# Patient Record
Sex: FEMALE | Race: BLACK OR AFRICAN AMERICAN | NOT HISPANIC OR LATINO | Employment: FULL TIME | URBAN - METROPOLITAN AREA
[De-identification: names, ages, dates, MRNs, and addresses within clinical notes are randomized per-mention and may not be internally consistent; named-entity substitution may affect disease eponyms.]

---

## 2023-04-07 LAB
ALANINE AMINOTRANSFERASE (SGPT) (U/L) IN SER/PLAS: 13 U/L (ref 7–45)
ALBUMIN (G/DL) IN SER/PLAS: 4.2 G/DL (ref 3.4–5)
ALKALINE PHOSPHATASE (U/L) IN SER/PLAS: 73 U/L (ref 33–136)
ANION GAP IN SER/PLAS: 13 MMOL/L (ref 10–20)
ASPARTATE AMINOTRANSFERASE (SGOT) (U/L) IN SER/PLAS: 17 U/L (ref 9–39)
BILIRUBIN TOTAL (MG/DL) IN SER/PLAS: 0.7 MG/DL (ref 0–1.2)
CALCIUM (MG/DL) IN SER/PLAS: 9.3 MG/DL (ref 8.6–10.6)
CARBON DIOXIDE, TOTAL (MMOL/L) IN SER/PLAS: 27 MMOL/L (ref 21–32)
CHLORIDE (MMOL/L) IN SER/PLAS: 105 MMOL/L (ref 98–107)
CHOLESTEROL (MG/DL) IN SER/PLAS: 225 MG/DL (ref 0–199)
CHOLESTEROL IN HDL (MG/DL) IN SER/PLAS: 65.1 MG/DL
CHOLESTEROL/HDL RATIO: 3.5
CREATININE (MG/DL) IN SER/PLAS: 0.72 MG/DL (ref 0.5–1.05)
ERYTHROCYTE DISTRIBUTION WIDTH (RATIO) BY AUTOMATED COUNT: 13.4 % (ref 11.5–14.5)
ERYTHROCYTE MEAN CORPUSCULAR HEMOGLOBIN CONCENTRATION (G/DL) BY AUTOMATED: 34.6 G/DL (ref 32–36)
ERYTHROCYTE MEAN CORPUSCULAR VOLUME (FL) BY AUTOMATED COUNT: 82 FL (ref 80–100)
ERYTHROCYTES (10*6/UL) IN BLOOD BY AUTOMATED COUNT: 4.84 X10E12/L (ref 4–5.2)
ESTIMATED AVERAGE GLUCOSE FOR HBA1C: 111 MG/DL
GFR FEMALE: >90 ML/MIN/1.73M2
GLUCOSE (MG/DL) IN SER/PLAS: 102 MG/DL (ref 74–99)
HEMATOCRIT (%) IN BLOOD BY AUTOMATED COUNT: 39.6 % (ref 36–46)
HEMOGLOBIN (G/DL) IN BLOOD: 13.7 G/DL (ref 12–16)
HEMOGLOBIN A1C/HEMOGLOBIN TOTAL IN BLOOD: 5.5 %
LDL: 135 MG/DL (ref 0–99)
LEUKOCYTES (10*3/UL) IN BLOOD BY AUTOMATED COUNT: 5.5 X10E9/L (ref 4.4–11.3)
NRBC (PER 100 WBCS) BY AUTOMATED COUNT: 0 /100 WBC (ref 0–0)
PLATELETS (10*3/UL) IN BLOOD AUTOMATED COUNT: 327 X10E9/L (ref 150–450)
POTASSIUM (MMOL/L) IN SER/PLAS: 4 MMOL/L (ref 3.5–5.3)
PROTEIN TOTAL: 6.9 G/DL (ref 6.4–8.2)
SODIUM (MMOL/L) IN SER/PLAS: 141 MMOL/L (ref 136–145)
THYROTROPIN (MIU/L) IN SER/PLAS BY DETECTION LIMIT <= 0.05 MIU/L: 2.1 MIU/L (ref 0.44–3.98)
TRIGLYCERIDE (MG/DL) IN SER/PLAS: 127 MG/DL (ref 0–149)
UREA NITROGEN (MG/DL) IN SER/PLAS: 14 MG/DL (ref 6–23)
VLDL: 25 MG/DL (ref 0–40)

## 2023-10-12 ENCOUNTER — ANCILLARY PROCEDURE (OUTPATIENT)
Dept: RADIOLOGY | Facility: CLINIC | Age: 63
End: 2023-10-12
Payer: COMMERCIAL

## 2023-10-12 ENCOUNTER — OFFICE VISIT (OUTPATIENT)
Dept: ORTHOPEDIC SURGERY | Facility: CLINIC | Age: 63
End: 2023-10-12
Payer: COMMERCIAL

## 2023-10-12 DIAGNOSIS — M13.852 ALLERGIC ARTHRITIS OF LEFT HIP: ICD-10-CM

## 2023-10-12 DIAGNOSIS — M16.10 HIP ARTHRITIS: Primary | ICD-10-CM

## 2023-10-12 DIAGNOSIS — M25.552 LEFT HIP PAIN: ICD-10-CM

## 2023-10-12 DIAGNOSIS — M25.552 LEFT HIP PAIN: Primary | ICD-10-CM

## 2023-10-12 PROBLEM — Z96.641 STATUS POST TOTAL REPLACEMENT OF RIGHT HIP: Status: ACTIVE | Noted: 2023-10-12

## 2023-10-12 PROBLEM — D3A.026 CARCINOID TUMOR OF RECTUM (CMS-HCC): Status: ACTIVE | Noted: 2023-10-12

## 2023-10-12 PROBLEM — J30.9 ALLERGIC RHINITIS: Status: ACTIVE | Noted: 2023-10-12

## 2023-10-12 PROBLEM — B37.31 VAGINAL CANDIDIASIS: Status: ACTIVE | Noted: 2023-10-12

## 2023-10-12 PROBLEM — H10.13 ATOPIC CONJUNCTIVITIS OF BOTH EYES: Status: ACTIVE | Noted: 2023-10-12

## 2023-10-12 PROBLEM — R06.02 SHORTNESS OF BREATH: Status: ACTIVE | Noted: 2023-10-12

## 2023-10-12 PROBLEM — K64.4 ANAL SKIN TAG: Status: ACTIVE | Noted: 2023-10-12

## 2023-10-12 PROBLEM — M47.9 OSTEOARTHRITIS OF LOWER BACK: Status: ACTIVE | Noted: 2023-10-12

## 2023-10-12 PROBLEM — R06.89 IMPAIRED GAS EXCHANGE: Status: ACTIVE | Noted: 2023-10-12

## 2023-10-12 PROBLEM — K63.5 COLON POLYP: Status: ACTIVE | Noted: 2023-10-12

## 2023-10-12 PROBLEM — H57.9 ITCHY EYES: Status: ACTIVE | Noted: 2023-10-12

## 2023-10-12 PROBLEM — H18.413 ARCUS SENILIS OF BOTH CORNEAS: Status: ACTIVE | Noted: 2023-10-12

## 2023-10-12 PROBLEM — N81.0 URETHROCELE: Status: ACTIVE | Noted: 2023-10-12

## 2023-10-12 PROBLEM — E78.5 DYSLIPIDEMIA: Status: ACTIVE | Noted: 2023-10-12

## 2023-10-12 PROBLEM — Z85.040: Status: ACTIVE | Noted: 2023-10-12

## 2023-10-12 PROBLEM — R35.1 NOCTURIA: Status: ACTIVE | Noted: 2023-10-12

## 2023-10-12 PROBLEM — I10 HYPERTENSION: Status: ACTIVE | Noted: 2023-10-12

## 2023-10-12 PROBLEM — M17.9 OA (OSTEOARTHRITIS) OF KNEE: Status: ACTIVE | Noted: 2023-10-12

## 2023-10-12 PROBLEM — N32.81 OVERACTIVE BLADDER: Status: ACTIVE | Noted: 2023-10-12

## 2023-10-12 PROBLEM — K21.9 ESOPHAGEAL REFLUX: Status: ACTIVE | Noted: 2023-10-12

## 2023-10-12 PROBLEM — L30.9 ECZEMA: Status: ACTIVE | Noted: 2023-10-12

## 2023-10-12 PROBLEM — L72.3 SEBACEOUS CYST: Status: ACTIVE | Noted: 2023-10-12

## 2023-10-12 PROBLEM — E55.9 VITAMIN D DEFICIENCY: Status: ACTIVE | Noted: 2023-10-12

## 2023-10-12 PROBLEM — R13.10 DYSPHAGIA: Status: ACTIVE | Noted: 2023-10-12

## 2023-10-12 PROBLEM — N95.2 ATROPHIC VAGINITIS: Status: ACTIVE | Noted: 2023-10-12

## 2023-10-12 PROBLEM — R92.8 ABNORMAL MAMMOGRAM: Status: ACTIVE | Noted: 2023-10-12

## 2023-10-12 PROBLEM — M16.9 OA (OSTEOARTHRITIS) OF HIP: Status: ACTIVE | Noted: 2023-10-12

## 2023-10-12 PROBLEM — N89.8 VAGINAL LESION: Status: ACTIVE | Noted: 2023-10-12

## 2023-10-12 PROBLEM — T14.8XXA MUSCLE STRAIN: Status: ACTIVE | Noted: 2023-10-12

## 2023-10-12 PROBLEM — H53.8 BLURRY VISION, BILATERAL: Status: ACTIVE | Noted: 2023-10-12

## 2023-10-12 PROBLEM — M16.11 PRIMARY OSTEOARTHRITIS OF RIGHT HIP: Status: ACTIVE | Noted: 2023-10-12

## 2023-10-12 PROBLEM — Z86.012 HISTORY OF BENIGN CARCINOID TUMOR: Status: ACTIVE | Noted: 2023-10-12

## 2023-10-12 PROBLEM — L29.9 ITCHING: Status: ACTIVE | Noted: 2023-10-12

## 2023-10-12 PROBLEM — N76.2 VULVITIS: Status: ACTIVE | Noted: 2023-10-12

## 2023-10-12 PROBLEM — N39.41 URGE INCONTINENCE: Status: ACTIVE | Noted: 2023-10-12

## 2023-10-12 PROCEDURE — 1126F AMNT PAIN NOTED NONE PRSNT: CPT | Performed by: ORTHOPAEDIC SURGERY

## 2023-10-12 PROCEDURE — 3075F SYST BP GE 130 - 139MM HG: CPT | Performed by: ORTHOPAEDIC SURGERY

## 2023-10-12 PROCEDURE — 99214 OFFICE O/P EST MOD 30 MIN: CPT | Performed by: ORTHOPAEDIC SURGERY

## 2023-10-12 PROCEDURE — 73502 X-RAY EXAM HIP UNI 2-3 VIEWS: CPT | Mod: LEFT SIDE | Performed by: RADIOLOGY

## 2023-10-12 PROCEDURE — 3078F DIAST BP <80 MM HG: CPT | Performed by: ORTHOPAEDIC SURGERY

## 2023-10-12 PROCEDURE — 73502 X-RAY EXAM HIP UNI 2-3 VIEWS: CPT | Mod: LT

## 2023-10-12 RX ORDER — TRIAMCINOLONE ACETONIDE 0.25 MG/G
OINTMENT TOPICAL
COMMUNITY
Start: 2016-07-07 | End: 2023-11-21 | Stop reason: ALTCHOICE

## 2023-10-12 RX ORDER — AMITRIPTYLINE HYDROCHLORIDE 10 MG/1
10 TABLET, FILM COATED ORAL NIGHTLY
COMMUNITY

## 2023-10-12 RX ORDER — CHOLECALCIFEROL (VITAMIN D3) 125 MCG
1 CAPSULE ORAL DAILY
COMMUNITY

## 2023-10-12 RX ORDER — CLOBETASOL PROPIONATE 0.5 MG/G
OINTMENT TOPICAL
COMMUNITY
Start: 2023-02-12 | End: 2023-11-21 | Stop reason: ALTCHOICE

## 2023-10-12 RX ORDER — MUPIROCIN 20 MG/G
OINTMENT TOPICAL 2 TIMES DAILY
COMMUNITY
Start: 2020-03-12 | End: 2023-11-15 | Stop reason: WASHOUT

## 2023-10-12 RX ORDER — AZELASTINE HYDROCHLORIDE 0.5 MG/ML
1 SOLUTION/ DROPS OPHTHALMIC 2 TIMES DAILY PRN
COMMUNITY

## 2023-10-12 RX ORDER — HYDROXYZINE HYDROCHLORIDE 10 MG/1
1-2 TABLET, FILM COATED ORAL
COMMUNITY
Start: 2020-01-21 | End: 2023-11-21 | Stop reason: ALTCHOICE

## 2023-10-12 RX ORDER — VIBEGRON 75 MG/1
TABLET, FILM COATED ORAL
COMMUNITY
Start: 2023-10-09 | End: 2023-11-15 | Stop reason: WASHOUT

## 2023-10-12 RX ORDER — TRAMADOL HYDROCHLORIDE 50 MG/1
50 TABLET ORAL EVERY 8 HOURS PRN
Qty: 10 TABLET | Refills: 0 | Status: SHIPPED | OUTPATIENT
Start: 2023-10-12 | End: 2023-10-17

## 2023-10-12 RX ORDER — DICLOFENAC POTASSIUM 50 MG/1
1 TABLET, FILM COATED ORAL 2 TIMES DAILY
COMMUNITY
End: 2023-11-15 | Stop reason: WASHOUT

## 2023-10-12 RX ORDER — OXYBUTYNIN CHLORIDE 10 MG/1
10 TABLET, EXTENDED RELEASE ORAL 2 TIMES DAILY
COMMUNITY
Start: 2023-01-23 | End: 2023-10-31

## 2023-10-12 RX ORDER — MINOXIDIL 2.5 MG/1
0.5 TABLET ORAL DAILY
COMMUNITY
Start: 2023-08-14

## 2023-10-12 RX ORDER — DICLOFENAC SODIUM 100 MG/1
100 TABLET, EXTENDED RELEASE ORAL DAILY
COMMUNITY
End: 2023-10-31

## 2023-10-12 RX ORDER — CALCIUM CARBONATE 500(1250)
1 TABLET ORAL 2 TIMES DAILY
COMMUNITY
End: 2023-11-15 | Stop reason: WASHOUT

## 2023-10-12 RX ORDER — AMOXICILLIN 500 MG/1
CAPSULE ORAL
COMMUNITY
Start: 2023-05-08 | End: 2024-01-18 | Stop reason: ALTCHOICE

## 2023-10-12 RX ORDER — ASCORBATE CALCIUM 500 MG
1 TABLET ORAL DAILY
COMMUNITY
Start: 2017-06-08 | End: 2023-11-15 | Stop reason: WASHOUT

## 2023-10-12 RX ORDER — OXYCODONE AND ACETAMINOPHEN 5; 325 MG/1; MG/1
1 TABLET ORAL EVERY 6 HOURS PRN
COMMUNITY
Start: 2020-12-30 | End: 2023-11-15 | Stop reason: WASHOUT

## 2023-10-12 RX ORDER — MIRABEGRON 50 MG/1
50 TABLET, FILM COATED, EXTENDED RELEASE ORAL DAILY
COMMUNITY

## 2023-10-12 RX ORDER — OXYBUTYNIN CHLORIDE 5 MG/1
TABLET ORAL 2 TIMES DAILY
COMMUNITY
Start: 2013-09-09 | End: 2023-11-15 | Stop reason: WASHOUT

## 2023-10-12 RX ORDER — CLOBETASOL PROPIONATE 0.5 MG/G
CREAM TOPICAL
COMMUNITY
Start: 2020-01-30

## 2023-10-12 RX ORDER — TOBRAMYCIN 3 MG/ML
1-2 SOLUTION/ DROPS OPHTHALMIC 4 TIMES DAILY
COMMUNITY
Start: 2022-07-29 | End: 2023-11-15 | Stop reason: WASHOUT

## 2023-10-12 RX ORDER — ERGOCALCIFEROL 1.25 MG/1
1 CAPSULE ORAL
COMMUNITY
Start: 2017-06-08 | End: 2023-10-31

## 2023-10-12 RX ORDER — PROPRANOLOL/HYDROCHLOROTHIAZID 40 MG-25MG
1 TABLET ORAL DAILY
COMMUNITY
End: 2023-11-21 | Stop reason: ALTCHOICE

## 2023-10-12 RX ORDER — CYCLOBENZAPRINE HCL 10 MG
1 TABLET ORAL NIGHTLY PRN
COMMUNITY
Start: 2020-05-01

## 2023-10-12 RX ORDER — OXYBUTYNIN CHLORIDE 15 MG/1
15 TABLET, EXTENDED RELEASE ORAL 2 TIMES DAILY
COMMUNITY

## 2023-10-12 RX ORDER — FLUTICASONE PROPIONATE 50 MCG
1 SPRAY, SUSPENSION (ML) NASAL DAILY PRN
COMMUNITY
Start: 2019-10-22

## 2023-10-12 RX ORDER — LANOLIN ALCOHOL/MO/W.PET/CERES
1 CREAM (GRAM) TOPICAL DAILY
COMMUNITY

## 2023-10-12 RX ORDER — SIMVASTATIN 20 MG/1
TABLET, FILM COATED ORAL
COMMUNITY
Start: 2023-10-03

## 2023-10-12 RX ORDER — OLOPATADINE HYDROCHLORIDE 2 MG/ML
1 SOLUTION/ DROPS OPHTHALMIC DAILY
COMMUNITY
Start: 2017-08-10 | End: 2023-11-15 | Stop reason: WASHOUT

## 2023-10-12 RX ORDER — MELOXICAM 7.5 MG/1
1 TABLET ORAL DAILY
COMMUNITY
End: 2023-11-15 | Stop reason: WASHOUT

## 2023-10-12 RX ORDER — DUTASTERIDE 0.5 MG/1
CAPSULE, LIQUID FILLED ORAL DAILY
COMMUNITY
Start: 2023-04-28 | End: 2023-11-15 | Stop reason: WASHOUT

## 2023-10-12 RX ORDER — DICLOFENAC SODIUM 10 MG/G
4 GEL TOPICAL 2 TIMES DAILY PRN
COMMUNITY
Start: 2023-05-08 | End: 2024-04-01 | Stop reason: SDUPTHER

## 2023-10-12 RX ORDER — HYDROCHLOROTHIAZIDE 25 MG/1
1 TABLET ORAL DAILY
COMMUNITY
Start: 2016-06-01 | End: 2024-03-04

## 2023-10-12 RX ORDER — UBIDECARENONE 75 MG
1 CAPSULE ORAL DAILY
COMMUNITY
End: 2023-11-21 | Stop reason: ALTCHOICE

## 2023-10-12 RX ORDER — OMEPRAZOLE 40 MG/1
1 CAPSULE, DELAYED RELEASE ORAL DAILY
COMMUNITY
Start: 2017-05-02 | End: 2023-11-15 | Stop reason: WASHOUT

## 2023-10-12 RX ORDER — TRIAMCINOLONE ACETONIDE OINTMENT USP, 0.05% 0.5 MG/G
1 OINTMENT TOPICAL 3 TIMES DAILY
COMMUNITY
End: 2023-11-15 | Stop reason: WASHOUT

## 2023-10-12 RX ORDER — MONTELUKAST SODIUM 10 MG/1
1 TABLET ORAL DAILY PRN
COMMUNITY
Start: 2019-10-22 | End: 2024-01-18 | Stop reason: SDUPTHER

## 2023-10-12 RX ORDER — ACETAMINOPHEN 500 MG
1 TABLET ORAL DAILY
COMMUNITY
End: 2024-05-30 | Stop reason: SDUPTHER

## 2023-10-12 RX ORDER — HYDROCODONE BITARTRATE AND ACETAMINOPHEN 10; 325 MG/1; MG/1
1 TABLET ORAL EVERY 4 HOURS PRN
COMMUNITY
End: 2023-11-15 | Stop reason: WASHOUT

## 2023-10-12 RX ORDER — ALBUTEROL SULFATE 90 UG/1
1-2 AEROSOL, METERED RESPIRATORY (INHALATION)
COMMUNITY

## 2023-10-12 NOTE — PROGRESS NOTES
Patient presents with progressive severe left hip pain  She had a right hip replacement done by me several years ago and has done well she has disabling left hip pain now treatment has included activity modification ambulatory aids and attempts at weight loss and activity modification  She has rest pain in fact is requesting pain medications as well  She like to proceed with left total hip arthroplasty  Past medical,family and social histories have been reviewed and are up to date.  All other body systems have been reviewed and are negative for complaint.  Constitutional: Well-developed well-nourished   Eyes: Sclerae anicteric, pupils equal and round  HENT: Normocephalic atraumatic  Cardiovascular: Pulses full, regular rate and rhythm  Respiratory: Breathing not labored, no wheezing  Integumentary: Skin intact, no lesions or rashes  Neurological: Sensation intact, no gross strength deficits, reflexes equal  Psychiatric: Alert oriented and appropriate  Hematologic/lymphatic: No lymphadenopathy  Left hip slight flexion contracture antalgic gait 0 rotation with reproduction of pain  X-rays show severe end-stage arthritis with complete ablation of joint space cystic and sclerotic changes  Impression end-stage arthritis  This patient has failed nonoperative treatment for hip arthritis which has included activity modification attempts at weight loss physical therapy and oral anti-inflammatory medication.  We have discussed the potential risks of surgery including but not limited to leg length inequality, infection, DVT, neurovascular injury persistent pain, prosthetic loosening and periprosthetic fracture.  The patient wishes to proceed with surgery  Patient understands the preoperative medical evaluation and joint replacement class will be necessary.  The patient also understands that the plan is this for her to be an outpatient procedure and that appropriate arrangements must be made for supervision and assistance at  home.  She does state that she has limited help at home and may need admission for care

## 2023-10-20 PROBLEM — M13.852 ALLERGIC ARTHRITIS OF LEFT HIP: Status: ACTIVE | Noted: 2023-10-12

## 2023-10-31 DIAGNOSIS — E55.9 VITAMIN D DEFICIENCY, UNSPECIFIED: ICD-10-CM

## 2023-10-31 DIAGNOSIS — M25.551 PAIN IN RIGHT HIP: ICD-10-CM

## 2023-10-31 DIAGNOSIS — N39.41 URGE INCONTINENCE: ICD-10-CM

## 2023-10-31 DIAGNOSIS — M25.552 PAIN IN LEFT HIP: ICD-10-CM

## 2023-10-31 RX ORDER — OXYBUTYNIN CHLORIDE 10 MG/1
10 TABLET, EXTENDED RELEASE ORAL 2 TIMES DAILY
Qty: 180 TABLET | Refills: 3 | Status: SHIPPED | OUTPATIENT
Start: 2023-10-31 | End: 2023-11-15 | Stop reason: WASHOUT

## 2023-10-31 RX ORDER — ERGOCALCIFEROL 1.25 MG/1
1 CAPSULE ORAL
Qty: 6 CAPSULE | Refills: 1 | Status: SHIPPED | OUTPATIENT
Start: 2023-10-31 | End: 2024-02-15

## 2023-10-31 RX ORDER — DICLOFENAC SODIUM 100 MG/1
100 TABLET, EXTENDED RELEASE ORAL DAILY
Qty: 30 TABLET | Refills: 0 | Status: SHIPPED | OUTPATIENT
Start: 2023-10-31 | End: 2023-11-21 | Stop reason: ALTCHOICE

## 2023-11-08 PROBLEM — M16.12 PRIMARY OSTEOARTHRITIS OF LEFT HIP: Status: ACTIVE | Noted: 2023-10-12

## 2023-11-10 ENCOUNTER — TELEPHONE (OUTPATIENT)
Dept: PREADMISSION TESTING | Facility: HOSPITAL | Age: 63
End: 2023-11-10
Payer: COMMERCIAL

## 2023-11-15 ENCOUNTER — TELEMEDICINE CLINICAL SUPPORT (OUTPATIENT)
Dept: PREADMISSION TESTING | Facility: HOSPITAL | Age: 63
End: 2023-11-15
Payer: COMMERCIAL

## 2023-11-21 ENCOUNTER — PRE-ADMISSION TESTING (OUTPATIENT)
Dept: PREADMISSION TESTING | Facility: HOSPITAL | Age: 63
End: 2023-11-21
Payer: COMMERCIAL

## 2023-11-21 ENCOUNTER — LAB (OUTPATIENT)
Dept: LAB | Facility: LAB | Age: 63
End: 2023-11-21
Payer: COMMERCIAL

## 2023-11-21 ENCOUNTER — HOSPITAL ENCOUNTER (OUTPATIENT)
Dept: RADIOLOGY | Facility: HOSPITAL | Age: 63
Discharge: HOME | End: 2023-11-21
Payer: COMMERCIAL

## 2023-11-21 VITALS
OXYGEN SATURATION: 96 % | BODY MASS INDEX: 30.23 KG/M2 | RESPIRATION RATE: 18 BRPM | TEMPERATURE: 96.1 F | WEIGHT: 181.44 LBS | DIASTOLIC BLOOD PRESSURE: 68 MMHG | SYSTOLIC BLOOD PRESSURE: 131 MMHG | HEIGHT: 65 IN | HEART RATE: 80 BPM

## 2023-11-21 DIAGNOSIS — I10 PRIMARY HYPERTENSION: ICD-10-CM

## 2023-11-21 DIAGNOSIS — M25.552 LEFT HIP PAIN: ICD-10-CM

## 2023-11-21 DIAGNOSIS — M25.552 LEFT HIP PAIN: Primary | ICD-10-CM

## 2023-11-21 DIAGNOSIS — I10 PRIMARY HYPERTENSION: Primary | ICD-10-CM

## 2023-11-21 LAB
ABO GROUP (TYPE) IN BLOOD: NORMAL
ANION GAP SERPL CALC-SCNC: 13 MMOL/L (ref 10–20)
ANTIBODY SCREEN: NORMAL
BASOPHILS # BLD AUTO: 0.02 X10*3/UL (ref 0–0.1)
BASOPHILS NFR BLD AUTO: 0.2 %
BUN SERPL-MCNC: 9 MG/DL (ref 6–23)
CALCIUM SERPL-MCNC: 8.6 MG/DL (ref 8.6–10.3)
CHLORIDE SERPL-SCNC: 102 MMOL/L (ref 98–107)
CO2 SERPL-SCNC: 28 MMOL/L (ref 21–32)
CREAT SERPL-MCNC: 0.65 MG/DL (ref 0.5–1.05)
EOSINOPHIL # BLD AUTO: 0.13 X10*3/UL (ref 0–0.7)
EOSINOPHIL NFR BLD AUTO: 1.6 %
ERYTHROCYTE [DISTWIDTH] IN BLOOD BY AUTOMATED COUNT: 13.1 % (ref 11.5–14.5)
GFR SERPL CREATININE-BSD FRML MDRD: >90 ML/MIN/1.73M*2
GLUCOSE SERPL-MCNC: 64 MG/DL (ref 74–99)
HCT VFR BLD AUTO: 38.9 % (ref 36–46)
HGB BLD-MCNC: 13.5 G/DL (ref 12–16)
IMM GRANULOCYTES # BLD AUTO: 0.03 X10*3/UL (ref 0–0.7)
IMM GRANULOCYTES NFR BLD AUTO: 0.4 % (ref 0–0.9)
LYMPHOCYTES # BLD AUTO: 1.52 X10*3/UL (ref 1.2–4.8)
LYMPHOCYTES NFR BLD AUTO: 18.2 %
MCH RBC QN AUTO: 28.7 PG (ref 26–34)
MCHC RBC AUTO-ENTMCNC: 34.7 G/DL (ref 32–36)
MCV RBC AUTO: 83 FL (ref 80–100)
MONOCYTES # BLD AUTO: 0.42 X10*3/UL (ref 0.1–1)
MONOCYTES NFR BLD AUTO: 5 %
NEUTROPHILS # BLD AUTO: 6.21 X10*3/UL (ref 1.2–7.7)
NEUTROPHILS NFR BLD AUTO: 74.6 %
NRBC BLD-RTO: 0 /100 WBCS (ref 0–0)
PLATELET # BLD AUTO: 317 X10*3/UL (ref 150–450)
POTASSIUM SERPL-SCNC: 4 MMOL/L (ref 3.5–5.3)
RBC # BLD AUTO: 4.7 X10*6/UL (ref 4–5.2)
RH FACTOR (ANTIGEN D): NORMAL
SODIUM SERPL-SCNC: 139 MMOL/L (ref 136–145)
WBC # BLD AUTO: 8.3 X10*3/UL (ref 4.4–11.3)

## 2023-11-21 PROCEDURE — 73502 X-RAY EXAM HIP UNI 2-3 VIEWS: CPT | Mod: LEFT SIDE | Performed by: RADIOLOGY

## 2023-11-21 PROCEDURE — 86850 RBC ANTIBODY SCREEN: CPT

## 2023-11-21 PROCEDURE — 93005 ELECTROCARDIOGRAM TRACING: CPT | Performed by: NURSE PRACTITIONER

## 2023-11-21 PROCEDURE — 86900 BLOOD TYPING SEROLOGIC ABO: CPT

## 2023-11-21 PROCEDURE — 36415 COLL VENOUS BLD VENIPUNCTURE: CPT

## 2023-11-21 PROCEDURE — 73502 X-RAY EXAM HIP UNI 2-3 VIEWS: CPT | Mod: LT,FY

## 2023-11-21 PROCEDURE — 85025 COMPLETE CBC W/AUTO DIFF WBC: CPT

## 2023-11-21 PROCEDURE — 87081 CULTURE SCREEN ONLY: CPT | Mod: AHULAB | Performed by: NURSE PRACTITIONER

## 2023-11-21 PROCEDURE — 99204 OFFICE O/P NEW MOD 45 MIN: CPT | Performed by: NURSE PRACTITIONER

## 2023-11-21 PROCEDURE — 80048 BASIC METABOLIC PNL TOTAL CA: CPT

## 2023-11-21 PROCEDURE — 86901 BLOOD TYPING SEROLOGIC RH(D): CPT

## 2023-11-21 RX ORDER — CHLORHEXIDINE GLUCONATE ORAL RINSE 1.2 MG/ML
SOLUTION DENTAL
Qty: 473 ML | Refills: 0 | Status: SHIPPED | OUTPATIENT
Start: 2023-11-21

## 2023-11-21 ASSESSMENT — ENCOUNTER SYMPTOMS
CARDIOVASCULAR NEGATIVE: 1
LIMITED RANGE OF MOTION: 1
CONSTITUTIONAL NEGATIVE: 1
RESPIRATORY NEGATIVE: 1
GASTROINTESTINAL NEGATIVE: 1
NEUROLOGICAL NEGATIVE: 1
DYSPNEA WITH EXERTION: 1
ARTHRALGIAS: 1
NECK NEGATIVE: 1
ENDOCRINE NEGATIVE: 1

## 2023-11-21 NOTE — PREPROCEDURE INSTRUCTIONS
Medication List            Accurate as of November 21, 2023  2:58 PM. Always use your most recent med list.                albuterol 90 mcg/actuation inhaler  Notes to patient: MAY USE MORNING OF SURGERY     amitriptyline 10 mg tablet  Commonly known as: Elavil  Notes to patient: MAY TAKE MORNING OF SURGERY     amoxicillin 500 mg capsule  Commonly known as: Amoxil  Notes to patient: MAY TAKE MORNING OF SURGERY     azelastine 0.05 % ophthalmic solution  Commonly known as: Optivar  Notes to patient: MAY USE MORNING OF SURGERY     biotin 5,000 mcg tablet,disintegrating  Medication Adjustments for Surgery: Stop 1 day before surgery     chlorhexidine 0.12 % solution  Commonly known as: Peridex  SWISH AND SPIT 15ML FOR 30 SECONDS NIGHT PRIOR TO SURGERY AND MORNING OF SURGERY     cholecalciferol 50 mcg (2,000 unit) capsule  Commonly known as: Vitamin D-3  Medication Adjustments for Surgery: Stop 1 day before surgery     clobetasol 0.05 % cream  Commonly known as: Temovate  Medication Adjustments for Surgery: Stop 1 day before surgery     cyanocobalamin 1,000 mcg tablet  Commonly known as: Vitamin B-12  Medication Adjustments for Surgery: Stop 1 day before surgery     cyclobenzaprine 10 mg tablet  Commonly known as: Flexeril  Notes to patient: MAY TAKE MORNING OF SURGERY     diclofenac sodium 1 % gel gel  Commonly known as: Voltaren  Medication Adjustments for Surgery: Stop 1 day before surgery     ergocalciferol 1.25 MG (11960 UT) capsule  Commonly known as: Vitamin D-2  TAKE 1 CAPSULE BY MOUTH ONE TIME PER WEEK  Medication Adjustments for Surgery: Stop 1 day before surgery     fluticasone 50 mcg/actuation nasal spray  Commonly known as: Flonase  Notes to patient: MAY USE MORNING OF SURGERY     hydroCHLOROthiazide 25 mg tablet  Commonly known as: HYDRODiuril  Medication Adjustments for Surgery: Stop 1 day before surgery     minoxidil 2.5 mg tablet  Commonly known as: Loniten  Medication Adjustments for Surgery: Stop 1  day before surgery     montelukast 10 mg tablet  Commonly known as: Singulair  Medication Adjustments for Surgery: Stop 1 day before surgery     Myrbetriq 50 mg tablet extended release 24 hr 24 hr tablet  Generic drug: mirabegron  Notes to patient: MAY TAKE MORNING OF SURGERY     oxybutynin XL 15 mg 24 hr tablet  Commonly known as: Ditropan-XL  Notes to patient: MAY TAKE MORNING OF SURGERY     simvastatin 20 mg tablet  Commonly known as: Zocor  Medication Adjustments for Surgery: Stop 1 day before surgery            CONTACT SURGEON'S OFFICE IF YOU DEVELOP:  * Fever = 100.4 F   * New respiratory symptoms (e.g. cough, shortness of breath, respiratory distress, sore throat)  * Recent loss of taste or smell  *Flu like symptoms such as headache, fatigue or gastrointestinal symptoms  * You develop any open sores, shingles, burning or painful urination   AND/OR:  * You no longer wish to have the surgery.  * Any other personal circumstances change that may lead to the need to cancel or defer this surgery.  *You were admitted to any hospital within one week of your planned procedure.    SMOKING:  *Quitting smoking can make a huge difference to your health and recovery from surgery.    *If you need help with quitting, call 6-448-QUIT-NOW.    THE DAY BEFORE SURGERY:  *Do not eat any food after midnight the night before surgery.   *You are permitted to drink clear liquids (i.e. water, black coffee, tea, clear broth, apple juice) up to 2 hours before your surgery.  DIABETICS:  Please check fasting blood sugar  upon waking up.  If fasting sugar is <80 mg/dl, please drink 100ml/3oz of apple juice no later than 2 hours prior to surgery.      SURGICAL TIME  *You will be contacted between 2 p.m. and 6 p.m. the business day before your surgery with your arrival time.  *If you haven't received a call by 6pm, call [phone].  *Scheduled surgery times may change and you will be notified if this occurs-check your personal voicemail for  any updates.    ON THE MORNING OF SURGERY:  *Wear comfortable, loose fitting clothing.   *Do not use moisturizers, creams, lotions or perfume.  *All jewelry and valuables should be left at home.  *Prosthetic devices such as contact lenses, hearing aids, dentures, eyelash extensions, hairpins and body piercing must be removed before surgery.    BRING WITH YOU:  *Photo ID and insurance card  *Current list of medicines and allergies  *Pacemaker/Defibrillator/Heart stent cards  *CPAP machine and mask  *Slings/splints/crutches  *Copy of your complete Advanced Directive/DHPOA-if applicable  *Neurostimulator implant remote    PARKING AND ARRIVAL:  *Check in at the Main Entrance desk and let them know you are here for surgery.  *You will be directed to the 2nd floor surgical waiting area.    AFTER OUTPATIENT SURGERY:  *A responsible adult MUST accompany you at the time of discharge and stay with you for 24 hours after your surgery.  *You may NOT drive yourself home after surgery.  *You may use a taxi or ride sharing service ([employer], Uber) to return home ONLY if you are accompanied by a friend or family member.  *Instructions for resuming your medications will be provided by your surgeon.

## 2023-11-21 NOTE — CPM/PAT H&P
[facility]/PAT Evaluation       Name: Kiara Leyva (Kiara Leyva)  /Age: 1960/63 y.o.     In-Person       Date of Consult: 23    Referring Provider:  Dr. Rod    Surgery, Date, and Length: 23, left total hip replacement, 120 minutes    Patient presents to [facility] for perioperative risk assessment prior to scheduled surgery. Patient presents with end stage arthritis of left hip and has failed conservative non-operative treatment. She would like to proceed with left hip replacement.    This note was created in part upon personal review of patient's medical records.      Pt denies any past history of anesthetic complications such as PONV, awareness, prolonged sedation, dental damage, aspiration, cardiac arrest, difficult intubation, difficult I.V. access or unexpected hospital admissions.  No history of malignant hyperthermia and or pseudocholinesterase deficiency.    No history of blood transfusions.    The patient is not a Mu-[employer] and will accept blood and blood products if medically indicated.     Type and screen sent.    Past Medical History:   Diagnosis Date    Arthritis     Asthma     Carcinoid tumor     noted on colonoscopy, s/p resection    Eczema     GERD (gastroesophageal reflux disease)     Hyperlipidemia     Hypertension     OAB (overactive bladder)     Vitamin D deficiency        Past Surgical History:   Procedure Laterality Date    COLONOSCOPY      OTHER SURGICAL HISTORY      carcinoid tumor resection    TOTAL HIP ARTHROPLASTY Right     TUBAL LIGATION  2016    Tubal Ligation       Family History   Problem Relation Name Age of Onset    Coronary artery disease Mother          CABG    Other (homicicde) Father      Hypertension Father      Breast cancer Mother's Sister      Colon cancer Mother's Brother      Ovarian cysts Maternal Cousin         No Known Allergies      Current Outpatient Medications:     albuterol 90 mcg/actuation inhaler, Inhale 1-2 puffs. EVERY 4 TO 6  HOURS AS NEEDED, Disp: , Rfl:     amitriptyline (Elavil) 10 mg tablet, Take 1 tablet (10 mg) by mouth once daily at bedtime., Disp: , Rfl:     azelastine (Optivar) 0.05 % ophthalmic solution, Administer 1 drop into affected eye(s) 2 times a day as needed. affected eye, Disp: , Rfl:     biotin 5,000 mcg tablet,disintegrating, Take 1 tablet by mouth once daily., Disp: , Rfl:     cholecalciferol (Vitamin D-3) 50 mcg (2,000 unit) capsule, Take 1 capsule (50 mcg) by mouth early in the morning.., Disp: , Rfl:     clobetasol (Temovate) 0.05 % cream, APPLY sparingly to affected area Twice daily for 2 weeks, then once daily as needed., Disp: , Rfl:     cyanocobalamin (Vitamin B-12) 1,000 mcg tablet, Take 1 tablet (1,000 mcg) by mouth once daily., Disp: , Rfl:     cyclobenzaprine (Flexeril) 10 mg tablet, Take 1 tablet (10 mg) by mouth as needed at bedtime., Disp: , Rfl:     diclofenac sodium (Voltaren) 1 % gel gel, Apply 1 Application topically 2 times a day as needed. APPLY SPARINGLY TO AFFECTED AREA EVERY DAY, Disp: , Rfl:     ergocalciferol (Vitamin D-2) 1.25 MG (62053 UT) capsule, TAKE 1 CAPSULE BY MOUTH ONE TIME PER WEEK, Disp: 6 capsule, Rfl: 1    fluticasone (Flonase) 50 mcg/actuation nasal spray, Administer 1 spray into each nostril once daily as needed., Disp: , Rfl:     hydroCHLOROthiazide (HYDRODiuril) 25 mg tablet, Take 1 tablet (25 mg) by mouth once daily., Disp: , Rfl:     minoxidil (Loniten) 2.5 mg tablet, Take 0.5 tablets (1.25 mg) by mouth once daily., Disp: , Rfl:     montelukast (Singulair) 10 mg tablet, Take 1 tablet (10 mg) by mouth once daily as needed. As directed, Disp: , Rfl:     Myrbetriq 50 mg tablet extended release 24 hr 24 hr tablet, Take 1 tablet (50 mg) by mouth once daily., Disp: , Rfl:     oxybutynin XL (Ditropan-XL) 15 mg 24 hr tablet, Take 1 tablet (15 mg) by mouth 2 times a day., Disp: , Rfl:     simvastatin (Zocor) 20 mg tablet, , Disp: , Rfl:     amoxicillin (Amoxil) 500 mg capsule, TAKE  "4 CAPSULES BY MOUTH 1 HOUR BEFORE DENTAL APPOINTMENT, Disp: , Rfl:     chlorhexidine (Peridex) 0.12 % solution, SWISH AND SPIT 15ML FOR 30 SECONDS NIGHT PRIOR TO SURGERY AND MORNING OF SURGERY, Disp: 473 mL, Rfl: 0    PAT ROS:   Constitutional:   neg    Neuro/Psych:   neg    Eyes:    Corrective lenses  Ears:   neg    Nose:   neg    Mouth:   neg    Throat:   neg    Neck:   neg    Cardio:   neg     MCINTYRE  Respiratory:   neg    Endocrine:   neg    GI:   neg    :   neg    Musculoskeletal:    arthralgias   decreased ROM  Hematologic:   neg    Skin:  neg        Physical Exam  Vitals reviewed. Physical exam within normal limits.          PAT AIRWAY:   Airway:     Mallampati::  II    Neck ROM::  Full   No broken teeth, no dentures and no missing teeth        Visit Vitals  /68   Pulse 80   Temp 35.6 °C (96.1 °F)   Resp 18   Ht 1.651 m (5' 5\")   Wt 82.3 kg (181 lb 7 oz)   SpO2 96%   BMI 30.19 kg/m²   Smoking Status Former   BSA 1.94 m²     Assessment and Plan:     Patient is a 63 year old AA female scheduled for left total hip replacement on 11/29/23 with Dr. Rod.    Patient is at acceptable risk to proceed with planned surgical procedure. Further cardiac risk stratification deferred at this time.    Plan    Neuro:    Chronic pain- continue current medication regimen    Cardiovascular:    Patient denies any chest pain, tightness, heaviness, pressure, radiating pain, palpitations, irregular heartbeats, lightheadedness, cough, congestion, shortness of breath, MCINTYRE, PND, near syncope, weight loss or gain.    Fair functional capacity- limited by left hip pain and decreased ROM , using cane to ambulate.    EKG in PAT on 11/21/23 :  Normal sinus rhythm with sinus arrhythmia HR 81bpm  Normal ECG    RCRI: 0      HTN-controlled on current medication regimen. Instructed to hold hydrochlorothiazide DOS.    HLD- continue statin     Pulm:  Known or suspected MERCY is considered an independent risk factor for difficult mask " ventilation, difficult intubation or both.  Increased vigilance is recommended with the use of narcotics due to an increased risk for opioid induced respiratory depression.  The patient may benefit from continuous pulse oximetry to monitor for hypoxic events until baseline Sp02 is normal on room air.    Stop bang=3, HTN, age >50, obesity    GI/:    OAB- continue current medication regimen    Heme:  Patient instructed to ambulate as soon as possible postoperatively to decrease thromboembolic risk.    Initiate mechanical DVT prophylaxis as soon as possible and initiate chemical prophylaxis when deemed safe from a bleeding standpoint post surgery.    Caprini=7     Risk assessment complete.  Patient is scheduled for a intermediate surgical risk procedure. She is considered medically optimized for the planned procedure.    Labs/testing obtained in PAT on 11/21/23: CBC, BMP, T&S, MRSA, EKG.     Lab Results   Component Value Date    WBC 8.3 11/21/2023    HGB 13.5 11/21/2023    HCT 38.9 11/21/2023    MCV 83 11/21/2023     11/21/2023     Lab Results   Component Value Date    GLUCOSE 64 (L) 11/21/2023    CALCIUM 8.6 11/21/2023     11/21/2023    K 4.0 11/21/2023    CO2 28 11/21/2023     11/21/2023    BUN 9 11/21/2023    CREATININE 0.65 11/21/2023     Follow up: MRSA pending    Preoperative medication instructions were provided and reviewed with the patient.  Any additional testing or evaluation was explained to the patient.  Nothing by mouth instructions were discussed and patient's questions were answered prior to conclusion to this encounter.  Patient verbalized understanding of preoperative instructions given in preadmission testing; discharge instructions available in EMR.    This note was dictated with speech recognition.  Minor errors may have been detected during use of speech recognition.

## 2023-11-21 NOTE — H&P (VIEW-ONLY)
[facility]/PAT Evaluation       Name: Kiara Leyva (Kiara Leyva)  /Age: 1960/63 y.o.     In-Person       Date of Consult: 23    Referring Provider:  Dr. Rod    Surgery, Date, and Length: 23, left total hip replacement, 120 minutes    Patient presents to [facility] for perioperative risk assessment prior to scheduled surgery. Patient presents with end stage arthritis of left hip and has failed conservative non-operative treatment. She would like to proceed with left hip replacement.    This note was created in part upon personal review of patient's medical records.      Pt denies any past history of anesthetic complications such as PONV, awareness, prolonged sedation, dental damage, aspiration, cardiac arrest, difficult intubation, difficult I.V. access or unexpected hospital admissions.  No history of malignant hyperthermia and or pseudocholinesterase deficiency.    No history of blood transfusions.    The patient is not a [employer] and will accept blood and blood products if medically indicated.     Type and screen sent.    Past Medical History:   Diagnosis Date    Arthritis     Asthma     Carcinoid tumor     noted on colonoscopy, s/p resection    Eczema     GERD (gastroesophageal reflux disease)     Hyperlipidemia     Hypertension     OAB (overactive bladder)     Vitamin D deficiency        Past Surgical History:   Procedure Laterality Date    COLONOSCOPY      OTHER SURGICAL HISTORY      carcinoid tumor resection    TOTAL HIP ARTHROPLASTY Right     TUBAL LIGATION  2016    Tubal Ligation       Family History   Problem Relation Name Age of Onset    Coronary artery disease Mother          CABG    Other (homicicde) Father      Hypertension Father      Breast cancer Mother's Sister      Colon cancer Mother's Brother      Ovarian cysts Maternal Cousin         No Known Allergies      Current Outpatient Medications:     albuterol 90 mcg/actuation inhaler, Inhale 1-2 puffs. EVERY 4 TO 6  HOURS AS NEEDED, Disp: , Rfl:     amitriptyline (Elavil) 10 mg tablet, Take 1 tablet (10 mg) by mouth once daily at bedtime., Disp: , Rfl:     azelastine (Optivar) 0.05 % ophthalmic solution, Administer 1 drop into affected eye(s) 2 times a day as needed. affected eye, Disp: , Rfl:     biotin 5,000 mcg tablet,disintegrating, Take 1 tablet by mouth once daily., Disp: , Rfl:     cholecalciferol (Vitamin D-3) 50 mcg (2,000 unit) capsule, Take 1 capsule (50 mcg) by mouth early in the morning.., Disp: , Rfl:     clobetasol (Temovate) 0.05 % cream, APPLY sparingly to affected area Twice daily for 2 weeks, then once daily as needed., Disp: , Rfl:     cyanocobalamin (Vitamin B-12) 1,000 mcg tablet, Take 1 tablet (1,000 mcg) by mouth once daily., Disp: , Rfl:     cyclobenzaprine (Flexeril) 10 mg tablet, Take 1 tablet (10 mg) by mouth as needed at bedtime., Disp: , Rfl:     diclofenac sodium (Voltaren) 1 % gel gel, Apply 1 Application topically 2 times a day as needed. APPLY SPARINGLY TO AFFECTED AREA EVERY DAY, Disp: , Rfl:     ergocalciferol (Vitamin D-2) 1.25 MG (30022 UT) capsule, TAKE 1 CAPSULE BY MOUTH ONE TIME PER WEEK, Disp: 6 capsule, Rfl: 1    fluticasone (Flonase) 50 mcg/actuation nasal spray, Administer 1 spray into each nostril once daily as needed., Disp: , Rfl:     hydroCHLOROthiazide (HYDRODiuril) 25 mg tablet, Take 1 tablet (25 mg) by mouth once daily., Disp: , Rfl:     minoxidil (Loniten) 2.5 mg tablet, Take 0.5 tablets (1.25 mg) by mouth once daily., Disp: , Rfl:     montelukast (Singulair) 10 mg tablet, Take 1 tablet (10 mg) by mouth once daily as needed. As directed, Disp: , Rfl:     Myrbetriq 50 mg tablet extended release 24 hr 24 hr tablet, Take 1 tablet (50 mg) by mouth once daily., Disp: , Rfl:     oxybutynin XL (Ditropan-XL) 15 mg 24 hr tablet, Take 1 tablet (15 mg) by mouth 2 times a day., Disp: , Rfl:     simvastatin (Zocor) 20 mg tablet, , Disp: , Rfl:     amoxicillin (Amoxil) 500 mg capsule, TAKE  "4 CAPSULES BY MOUTH 1 HOUR BEFORE DENTAL APPOINTMENT, Disp: , Rfl:     chlorhexidine (Peridex) 0.12 % solution, SWISH AND SPIT 15ML FOR 30 SECONDS NIGHT PRIOR TO SURGERY AND MORNING OF SURGERY, Disp: 473 mL, Rfl: 0    PAT ROS:   Constitutional:   neg    Neuro/Psych:   neg    Eyes:    Corrective lenses  Ears:   neg    Nose:   neg    Mouth:   neg    Throat:   neg    Neck:   neg    Cardio:   neg     MCINTYRE  Respiratory:   neg    Endocrine:   neg    GI:   neg    :   neg    Musculoskeletal:    arthralgias   decreased ROM  Hematologic:   neg    Skin:  neg        Physical Exam  Vitals reviewed. Physical exam within normal limits.          PAT AIRWAY:   Airway:     Mallampati::  II    Neck ROM::  Full   No broken teeth, no dentures and no missing teeth        Visit Vitals  /68   Pulse 80   Temp 35.6 °C (96.1 °F)   Resp 18   Ht 1.651 m (5' 5\")   Wt 82.3 kg (181 lb 7 oz)   SpO2 96%   BMI 30.19 kg/m²   Smoking Status Former   BSA 1.94 m²     Assessment and Plan:     Patient is a 63 year old AA female scheduled for left total hip replacement on 11/29/23 with Dr. Rod.    Patient is at acceptable risk to proceed with planned surgical procedure. Further cardiac risk stratification deferred at this time.    Plan    Neuro:    Chronic pain- continue current medication regimen    Cardiovascular:    Patient denies any chest pain, tightness, heaviness, pressure, radiating pain, palpitations, irregular heartbeats, lightheadedness, cough, congestion, shortness of breath, MCINTYRE, PND, near syncope, weight loss or gain.    Fair functional capacity- limited by left hip pain and decreased ROM , using cane to ambulate.    EKG in PAT on 11/21/23 :  Normal sinus rhythm with sinus arrhythmia HR 81bpm  Normal ECG    RCRI: 0      HTN-controlled on current medication regimen. Instructed to hold hydrochlorothiazide DOS.    HLD- continue statin     Pulm:  Known or suspected MERCY is considered an independent risk factor for difficult mask " ventilation, difficult intubation or both.  Increased vigilance is recommended with the use of narcotics due to an increased risk for opioid induced respiratory depression.  The patient may benefit from continuous pulse oximetry to monitor for hypoxic events until baseline Sp02 is normal on room air.    Stop bang=3, HTN, age >50, obesity    GI/:    OAB- continue current medication regimen    Heme:  Patient instructed to ambulate as soon as possible postoperatively to decrease thromboembolic risk.    Initiate mechanical DVT prophylaxis as soon as possible and initiate chemical prophylaxis when deemed safe from a bleeding standpoint post surgery.    Caprini=7     Risk assessment complete.  Patient is scheduled for a intermediate surgical risk procedure. She is considered medically optimized for the planned procedure.    Labs/testing obtained in PAT on 11/21/23: CBC, BMP, T&S, MRSA, EKG.     Lab Results   Component Value Date    WBC 8.3 11/21/2023    HGB 13.5 11/21/2023    HCT 38.9 11/21/2023    MCV 83 11/21/2023     11/21/2023     Lab Results   Component Value Date    GLUCOSE 64 (L) 11/21/2023    CALCIUM 8.6 11/21/2023     11/21/2023    K 4.0 11/21/2023    CO2 28 11/21/2023     11/21/2023    BUN 9 11/21/2023    CREATININE 0.65 11/21/2023     Follow up: MRSA pending    Preoperative medication instructions were provided and reviewed with the patient.  Any additional testing or evaluation was explained to the patient.  Nothing by mouth instructions were discussed and patient's questions were answered prior to conclusion to this encounter.  Patient verbalized understanding of preoperative instructions given in preadmission testing; discharge instructions available in EMR.    This note was dictated with speech recognition.  Minor errors may have been detected during use of speech recognition.

## 2023-11-22 LAB
ATRIAL RATE: 81 BPM
P AXIS: 46 DEGREES
P OFFSET: 206 MS
P ONSET: 153 MS
PR INTERVAL: 152 MS
Q ONSET: 229 MS
QRS COUNT: 13 BEATS
QRS DURATION: 82 MS
QT INTERVAL: 376 MS
QTC CALCULATION(BAZETT): 436 MS
QTC FREDERICIA: 415 MS
R AXIS: -12 DEGREES
T AXIS: 33 DEGREES
T OFFSET: 417 MS
VENTRICULAR RATE: 81 BPM

## 2023-11-23 LAB — STAPHYLOCOCCUS SPEC CULT: NORMAL

## 2023-11-25 SDOH — ECONOMIC STABILITY: INCOME INSECURITY: IN THE PAST 12 MONTHS, HAS THE ELECTRIC, GAS, OIL, OR WATER COMPANY THREATENED TO SHUT OFF SERVICE IN YOUR HOME?: NO

## 2023-11-25 SDOH — ECONOMIC STABILITY: FOOD INSECURITY: WITHIN THE PAST 12 MONTHS, THE FOOD YOU BOUGHT JUST DIDN'T LAST AND YOU DIDN'T HAVE MONEY TO GET MORE.: NEVER TRUE

## 2023-11-25 SDOH — ECONOMIC STABILITY: INCOME INSECURITY: IN THE LAST 12 MONTHS, WAS THERE A TIME WHEN YOU WERE NOT ABLE TO PAY THE MORTGAGE OR RENT ON TIME?: NO

## 2023-11-25 SDOH — HEALTH STABILITY: MENTAL HEALTH: HOW MANY STANDARD DRINKS CONTAINING ALCOHOL DO YOU HAVE ON A TYPICAL DAY?: 1 OR 2

## 2023-11-25 SDOH — ECONOMIC STABILITY: TRANSPORTATION INSECURITY
IN THE PAST 12 MONTHS, HAS THE LACK OF TRANSPORTATION KEPT YOU FROM MEDICAL APPOINTMENTS OR FROM GETTING MEDICATIONS?: NO

## 2023-11-25 SDOH — ECONOMIC STABILITY: HOUSING INSECURITY
IN THE LAST 12 MONTHS, WAS THERE A TIME WHEN YOU DID NOT HAVE A STEADY PLACE TO SLEEP OR SLEPT IN A SHELTER (INCLUDING NOW)?: NO

## 2023-11-25 SDOH — SOCIAL STABILITY: SOCIAL INSECURITY: WITHIN THE LAST YEAR, HAVE YOU BEEN HUMILIATED OR EMOTIONALLY ABUSED IN OTHER WAYS BY YOUR PARTNER OR EX-PARTNER?: NO

## 2023-11-25 SDOH — HEALTH STABILITY: MENTAL HEALTH: HOW OFTEN DO YOU HAVE 6 OR MORE DRINKS ON ONE OCCASION?: NEVER

## 2023-11-25 SDOH — SOCIAL STABILITY: SOCIAL INSECURITY
WITHIN THE LAST YEAR, HAVE YOU BEEN KICKED, HIT, SLAPPED, OR OTHERWISE PHYSICALLY HURT BY YOUR PARTNER OR EX-PARTNER?: NO

## 2023-11-25 SDOH — SOCIAL STABILITY: SOCIAL INSECURITY: WITHIN THE LAST YEAR, HAVE YOU BEEN AFRAID OF YOUR PARTNER OR EX-PARTNER?: NO

## 2023-11-25 SDOH — ECONOMIC STABILITY: HOUSING INSECURITY: IN THE LAST 12 MONTHS, HOW MANY PLACES HAVE YOU LIVED?: 1

## 2023-11-25 SDOH — SOCIAL STABILITY: SOCIAL INSECURITY
WITHIN THE LAST YEAR, HAVE TO BEEN RAPED OR FORCED TO HAVE ANY KIND OF SEXUAL ACTIVITY BY YOUR PARTNER OR EX-PARTNER?: NO

## 2023-11-25 SDOH — ECONOMIC STABILITY: FOOD INSECURITY: WITHIN THE PAST 12 MONTHS, YOU WORRIED THAT YOUR FOOD WOULD RUN OUT BEFORE YOU GOT MONEY TO BUY MORE.: NEVER TRUE

## 2023-11-25 SDOH — HEALTH STABILITY: PHYSICAL HEALTH: ON AVERAGE, HOW MANY DAYS PER WEEK DO YOU ENGAGE IN MODERATE TO STRENUOUS EXERCISE (LIKE A BRISK WALK)?: 0 DAYS

## 2023-11-25 SDOH — HEALTH STABILITY: MENTAL HEALTH: HOW OFTEN DO YOU HAVE A DRINK CONTAINING ALCOHOL?: 2-4 TIMES A MONTH

## 2023-11-25 SDOH — ECONOMIC STABILITY: INCOME INSECURITY: HOW HARD IS IT FOR YOU TO PAY FOR THE VERY BASICS LIKE FOOD, HOUSING, MEDICAL CARE, AND HEATING?: NOT HARD AT ALL

## 2023-11-25 SDOH — HEALTH STABILITY: PHYSICAL HEALTH: ON AVERAGE, HOW MANY MINUTES DO YOU ENGAGE IN EXERCISE AT THIS LEVEL?: 0 MIN

## 2023-11-25 SDOH — ECONOMIC STABILITY: TRANSPORTATION INSECURITY
IN THE PAST 12 MONTHS, HAS LACK OF TRANSPORTATION KEPT YOU FROM MEETINGS, WORK, OR FROM GETTING THINGS NEEDED FOR DAILY LIVING?: NO

## 2023-11-25 ASSESSMENT — LIFESTYLE VARIABLES
AUDIT-C TOTAL SCORE: 2
SKIP TO QUESTIONS 9-10: 1

## 2023-11-25 ASSESSMENT — ACTIVITIES OF DAILY LIVING (ADL): LACK_OF_TRANSPORTATION: NO

## 2023-11-25 NOTE — PROGRESS NOTES
11/25/23 1741   Discharge Planning   Living Arrangements Alone   Support Systems Family members   Assistance Needed none   Type of Residence Private residence   Number of Stairs to Enter Residence 0   Number of Stairs Within Residence 0   Do you have animals or pets at home? No   Who is requesting discharge planning? Provider   Home or Post Acute Services In home services   Type of Home Care Services Home OT;Home PT   Patient expects to be discharged to: home   Does the patient need discharge transport arranged? No   Financial Resource Strain   How hard is it for you to pay for the very basics like food, housing, medical care, and heating? Not hard   Housing Stability   In the last 12 months, was there a time when you were not able to pay the mortgage or rent on time? N   In the last 12 months, how many places have you lived? 1   In the last 12 months, was there a time when you did not have a steady place to sleep or slept in a shelter (including now)? N   Transportation Needs   In the past 12 months, has lack of transportation kept you from medical appointments or from getting medications? no   In the past 12 months, has lack of transportation kept you from meetings, work, or from getting things needed for daily living? No   Patient Choice   Provider Choice list and CMS website (https://medicare.gov/care-compare#search) for post-acute Quality and Resource Measure Data were provided and reviewed with: Patient   Patient / Family choosing to utilize agency / facility established prior to hospitalization No     11/25/23 1742  Spoke with patient over phone to discuss discharge planning for after surgery.  Patient lives at home alone in an apartment.  The apartment in on the third floor and there is an elevator.  Patient is independent with ADLs and drives at baseline.  She uses a cane or walker for ambulation.  She states that she can have her granddaughter or another family member stay with her after discharge for  at least the first night.  She is agreeable to HHC.  Explained expectations of HH to patient.  Explained to patient that it will be difficult to find HHC with her insurance.  She is agreeable to mass referrals being sent.  According to Faisal, the only in network agencies are [facility], [facility], and [facility].  Patient is also concerned that she has used almost all of her therapy days for this year doing aquatherapy.  Explained to patient that we will not know that until the [facility] accepts and checks her insurance.  Her days will reset as of January 1st so that may impact her outpatient therapy days.  Once patient is admitted, will send referrals to see if there is an accepting HHC agency.  Email sent to Dr Rod and ortho coordinator JCARLOS Bullock RN to make them aware.  Tierra Jane RN [facility]

## 2023-11-29 ENCOUNTER — APPOINTMENT (OUTPATIENT)
Dept: RADIOLOGY | Facility: HOSPITAL | Age: 63
End: 2023-11-29
Payer: COMMERCIAL

## 2023-11-29 ENCOUNTER — PHARMACY VISIT (OUTPATIENT)
Dept: PHARMACY | Facility: CLINIC | Age: 63
End: 2023-11-29
Payer: MEDICARE

## 2023-11-29 ENCOUNTER — ANESTHESIA EVENT (OUTPATIENT)
Dept: OPERATING ROOM | Facility: HOSPITAL | Age: 63
End: 2023-11-29
Payer: COMMERCIAL

## 2023-11-29 ENCOUNTER — ANESTHESIA (OUTPATIENT)
Dept: OPERATING ROOM | Facility: HOSPITAL | Age: 63
End: 2023-11-29
Payer: COMMERCIAL

## 2023-11-29 ENCOUNTER — HOSPITAL ENCOUNTER (OUTPATIENT)
Facility: HOSPITAL | Age: 63
Setting detail: OBSERVATION
Discharge: HOME | End: 2023-11-30
Attending: ORTHOPAEDIC SURGERY | Admitting: ORTHOPAEDIC SURGERY
Payer: COMMERCIAL

## 2023-11-29 DIAGNOSIS — M16.12 PRIMARY OSTEOARTHRITIS OF LEFT HIP: Primary | ICD-10-CM

## 2023-11-29 DIAGNOSIS — G89.18 ACUTE POST-OPERATIVE PAIN: ICD-10-CM

## 2023-11-29 PROBLEM — M16.9 LOCALIZED OSTEOARTHRITIS OF HIP: Status: ACTIVE | Noted: 2023-11-29

## 2023-11-29 PROCEDURE — 2500000005 HC RX 250 GENERAL PHARMACY W/O HCPCS: Performed by: STUDENT IN AN ORGANIZED HEALTH CARE EDUCATION/TRAINING PROGRAM

## 2023-11-29 PROCEDURE — RXMED WILLOW AMBULATORY MEDICATION CHARGE

## 2023-11-29 PROCEDURE — 2500000005 HC RX 250 GENERAL PHARMACY W/O HCPCS: Performed by: ANESTHESIOLOGIST ASSISTANT

## 2023-11-29 PROCEDURE — 2500000004 HC RX 250 GENERAL PHARMACY W/ HCPCS (ALT 636 FOR OP/ED): Performed by: ANESTHESIOLOGIST ASSISTANT

## 2023-11-29 PROCEDURE — A4217 STERILE WATER/SALINE, 500 ML: HCPCS | Performed by: ORTHOPAEDIC SURGERY

## 2023-11-29 PROCEDURE — C1713 ANCHOR/SCREW BN/BN,TIS/BN: HCPCS | Performed by: ORTHOPAEDIC SURGERY

## 2023-11-29 PROCEDURE — 2720000007 HC OR 272 NO HCPCS: Performed by: ORTHOPAEDIC SURGERY

## 2023-11-29 PROCEDURE — 2500000004 HC RX 250 GENERAL PHARMACY W/ HCPCS (ALT 636 FOR OP/ED): Performed by: ORTHOPAEDIC SURGERY

## 2023-11-29 PROCEDURE — P9045 ALBUMIN (HUMAN), 5%, 250 ML: HCPCS | Mod: JZ | Performed by: ANESTHESIOLOGIST ASSISTANT

## 2023-11-29 PROCEDURE — 2500000005 HC RX 250 GENERAL PHARMACY W/O HCPCS: Performed by: ANESTHESIOLOGY

## 2023-11-29 PROCEDURE — 96375 TX/PRO/DX INJ NEW DRUG ADDON: CPT | Mod: 59

## 2023-11-29 PROCEDURE — 2500000001 HC RX 250 WO HCPCS SELF ADMINISTERED DRUGS (ALT 637 FOR MEDICARE OP): Performed by: STUDENT IN AN ORGANIZED HEALTH CARE EDUCATION/TRAINING PROGRAM

## 2023-11-29 PROCEDURE — 2500000004 HC RX 250 GENERAL PHARMACY W/ HCPCS (ALT 636 FOR OP/ED): Performed by: STUDENT IN AN ORGANIZED HEALTH CARE EDUCATION/TRAINING PROGRAM

## 2023-11-29 PROCEDURE — A27130 PR TOTAL HIP ARTHROPLASTY: Performed by: ANESTHESIOLOGY

## 2023-11-29 PROCEDURE — 72170 X-RAY EXAM OF PELVIS: CPT | Mod: FY

## 2023-11-29 PROCEDURE — G0378 HOSPITAL OBSERVATION PER HR: HCPCS

## 2023-11-29 PROCEDURE — 96365 THER/PROPH/DIAG IV INF INIT: CPT | Mod: 59

## 2023-11-29 PROCEDURE — 2500000004 HC RX 250 GENERAL PHARMACY W/ HCPCS (ALT 636 FOR OP/ED)

## 2023-11-29 PROCEDURE — C1776 JOINT DEVICE (IMPLANTABLE): HCPCS | Performed by: ORTHOPAEDIC SURGERY

## 2023-11-29 PROCEDURE — 2500000004 HC RX 250 GENERAL PHARMACY W/ HCPCS (ALT 636 FOR OP/ED): Performed by: ANESTHESIOLOGY

## 2023-11-29 PROCEDURE — 2780000003 HC OR 278 NO HCPCS: Performed by: ORTHOPAEDIC SURGERY

## 2023-11-29 PROCEDURE — 3600000010 HC OR TIME - EACH INCREMENTAL 1 MINUTE - PROCEDURE LEVEL FIVE: Performed by: ORTHOPAEDIC SURGERY

## 2023-11-29 PROCEDURE — 7100000001 HC RECOVERY ROOM TIME - INITIAL BASE CHARGE: Performed by: ORTHOPAEDIC SURGERY

## 2023-11-29 PROCEDURE — 3700000001 HC GENERAL ANESTHESIA TIME - INITIAL BASE CHARGE: Performed by: ORTHOPAEDIC SURGERY

## 2023-11-29 PROCEDURE — 7100000002 HC RECOVERY ROOM TIME - EACH INCREMENTAL 1 MINUTE: Performed by: ORTHOPAEDIC SURGERY

## 2023-11-29 PROCEDURE — 27130 TOTAL HIP ARTHROPLASTY: CPT | Performed by: ORTHOPAEDIC SURGERY

## 2023-11-29 PROCEDURE — A27130 PR TOTAL HIP ARTHROPLASTY

## 2023-11-29 PROCEDURE — 20610 DRAIN/INJ JOINT/BURSA W/O US: CPT | Performed by: ORTHOPAEDIC SURGERY

## 2023-11-29 PROCEDURE — 96376 TX/PRO/DX INJ SAME DRUG ADON: CPT | Mod: 59

## 2023-11-29 PROCEDURE — 3600000005 HC OR TIME - INITIAL BASE CHARGE - PROCEDURE LEVEL FIVE: Performed by: ORTHOPAEDIC SURGERY

## 2023-11-29 PROCEDURE — 76942 ECHO GUIDE FOR BIOPSY: CPT | Performed by: ANESTHESIOLOGY

## 2023-11-29 PROCEDURE — 96361 HYDRATE IV INFUSION ADD-ON: CPT | Mod: 59

## 2023-11-29 PROCEDURE — 3700000002 HC GENERAL ANESTHESIA TIME - EACH INCREMENTAL 1 MINUTE: Performed by: ORTHOPAEDIC SURGERY

## 2023-11-29 PROCEDURE — 96372 THER/PROPH/DIAG INJ SC/IM: CPT | Performed by: ORTHOPAEDIC SURGERY

## 2023-11-29 DEVICE — PINNACLE HIP SOLUTIONS ALTRX POLYETHYLENE ACETABULAR LINER +4 10 DEGREE 36MM ID 52MM OD
Type: IMPLANTABLE DEVICE | Site: HIP | Status: FUNCTIONAL
Brand: PINNACLE ALTRX

## 2023-11-29 DEVICE — TRI-LOCK BPS FEMORAL STEM 12/14 TAPER TRI-LOCK BPS W/GRIPTION SIZE 6 STD 107MM
Type: IMPLANTABLE DEVICE | Site: HIP | Status: FUNCTIONAL
Brand: TRI-LOCK GRIPTION

## 2023-11-29 DEVICE — PINNACLE GRIPTION ACETABULAR SHELL SECTOR 52MM OD
Type: IMPLANTABLE DEVICE | Site: HIP | Status: FUNCTIONAL
Brand: PINNACLE GRIPTION

## 2023-11-29 DEVICE — M-SPEC METAL FEMORAL HEAD 12/14 TAPER DIAMETER 36MM -2: Type: IMPLANTABLE DEVICE | Site: HIP | Status: FUNCTIONAL

## 2023-11-29 DEVICE — PINNACLE CANCELLOUS BONE SCREW 6.5MM X 25MM
Type: IMPLANTABLE DEVICE | Site: HIP | Status: FUNCTIONAL
Brand: PINNACLE

## 2023-11-29 RX ORDER — PROPOFOL 10 MG/ML
INJECTION, EMULSION INTRAVENOUS AS NEEDED
Status: DISCONTINUED | OUTPATIENT
Start: 2023-11-29 | End: 2023-11-29

## 2023-11-29 RX ORDER — METOCLOPRAMIDE 10 MG/1
10 TABLET ORAL EVERY 6 HOURS PRN
Status: DISCONTINUED | OUTPATIENT
Start: 2023-11-29 | End: 2023-11-30 | Stop reason: HOSPADM

## 2023-11-29 RX ORDER — SODIUM CHLORIDE, SODIUM LACTATE, POTASSIUM CHLORIDE, CALCIUM CHLORIDE 600; 310; 30; 20 MG/100ML; MG/100ML; MG/100ML; MG/100ML
100 INJECTION, SOLUTION INTRAVENOUS CONTINUOUS
Status: DISCONTINUED | OUTPATIENT
Start: 2023-11-29 | End: 2023-11-29 | Stop reason: HOSPADM

## 2023-11-29 RX ORDER — DOCUSATE SODIUM 100 MG/1
100 CAPSULE, LIQUID FILLED ORAL 2 TIMES DAILY
Qty: 60 CAPSULE | Refills: 0 | Status: SHIPPED | OUTPATIENT
Start: 2023-11-29 | End: 2023-12-30

## 2023-11-29 RX ORDER — POLYETHYLENE GLYCOL 3350 17 G/17G
17 POWDER, FOR SOLUTION ORAL DAILY
Status: DISCONTINUED | OUTPATIENT
Start: 2023-11-29 | End: 2023-11-30 | Stop reason: HOSPADM

## 2023-11-29 RX ORDER — LIDOCAINE HYDROCHLORIDE 10 MG/ML
0.1 INJECTION, SOLUTION EPIDURAL; INFILTRATION; INTRACAUDAL; PERINEURAL ONCE
Status: DISCONTINUED | OUTPATIENT
Start: 2023-11-29 | End: 2023-11-29 | Stop reason: HOSPADM

## 2023-11-29 RX ORDER — NALOXONE HYDROCHLORIDE 1 MG/ML
0.2 INJECTION INTRAMUSCULAR; INTRAVENOUS; SUBCUTANEOUS EVERY 5 MIN PRN
Status: DISCONTINUED | OUTPATIENT
Start: 2023-11-29 | End: 2023-11-30 | Stop reason: HOSPADM

## 2023-11-29 RX ORDER — ACETAMINOPHEN 325 MG/1
975 TABLET ORAL ONCE
Status: COMPLETED | OUTPATIENT
Start: 2023-11-29 | End: 2023-11-29

## 2023-11-29 RX ORDER — OXYCODONE HYDROCHLORIDE 5 MG/1
10 TABLET ORAL EVERY 4 HOURS PRN
Status: DISCONTINUED | OUTPATIENT
Start: 2023-11-29 | End: 2023-11-30 | Stop reason: HOSPADM

## 2023-11-29 RX ORDER — OXYCODONE HYDROCHLORIDE 5 MG/1
10 TABLET ORAL ONCE
Status: DISCONTINUED | OUTPATIENT
Start: 2023-11-29 | End: 2023-11-29 | Stop reason: HOSPADM

## 2023-11-29 RX ORDER — HYDROMORPHONE HYDROCHLORIDE 1 MG/ML
1 INJECTION, SOLUTION INTRAMUSCULAR; INTRAVENOUS; SUBCUTANEOUS EVERY 2 HOUR PRN
Status: DISCONTINUED | OUTPATIENT
Start: 2023-11-29 | End: 2023-11-30 | Stop reason: HOSPADM

## 2023-11-29 RX ORDER — ONDANSETRON HYDROCHLORIDE 2 MG/ML
4 INJECTION, SOLUTION INTRAVENOUS EVERY 8 HOURS PRN
Status: DISCONTINUED | OUTPATIENT
Start: 2023-11-29 | End: 2023-11-30 | Stop reason: HOSPADM

## 2023-11-29 RX ORDER — PROPOFOL 10 MG/ML
INJECTION, EMULSION INTRAVENOUS CONTINUOUS PRN
Status: DISCONTINUED | OUTPATIENT
Start: 2023-11-29 | End: 2023-11-29

## 2023-11-29 RX ORDER — KETOROLAC TROMETHAMINE 30 MG/ML
15 INJECTION, SOLUTION INTRAMUSCULAR; INTRAVENOUS EVERY 6 HOURS
Status: COMPLETED | OUTPATIENT
Start: 2023-11-29 | End: 2023-11-30

## 2023-11-29 RX ORDER — ALBUMIN HUMAN 50 G/1000ML
SOLUTION INTRAVENOUS AS NEEDED
Status: DISCONTINUED | OUTPATIENT
Start: 2023-11-29 | End: 2023-11-29

## 2023-11-29 RX ORDER — PANTOPRAZOLE SODIUM 40 MG/1
40 TABLET, DELAYED RELEASE ORAL
Status: DISCONTINUED | OUTPATIENT
Start: 2023-11-30 | End: 2023-11-30 | Stop reason: HOSPADM

## 2023-11-29 RX ORDER — FENTANYL CITRATE 50 UG/ML
INJECTION, SOLUTION INTRAMUSCULAR; INTRAVENOUS AS NEEDED
Status: DISCONTINUED | OUTPATIENT
Start: 2023-11-29 | End: 2023-11-29

## 2023-11-29 RX ORDER — METOCLOPRAMIDE HYDROCHLORIDE 5 MG/ML
10 INJECTION INTRAMUSCULAR; INTRAVENOUS EVERY 6 HOURS PRN
Status: DISCONTINUED | OUTPATIENT
Start: 2023-11-29 | End: 2023-11-30 | Stop reason: HOSPADM

## 2023-11-29 RX ORDER — PREGABALIN 75 MG/1
75 CAPSULE ORAL ONCE
Status: COMPLETED | OUTPATIENT
Start: 2023-11-29 | End: 2023-11-29

## 2023-11-29 RX ORDER — ASPIRIN 81 MG/1
81 TABLET ORAL 2 TIMES DAILY
Status: DISCONTINUED | OUTPATIENT
Start: 2023-11-30 | End: 2023-11-30 | Stop reason: HOSPADM

## 2023-11-29 RX ORDER — ONDANSETRON HYDROCHLORIDE 2 MG/ML
INJECTION, SOLUTION INTRAVENOUS AS NEEDED
Status: DISCONTINUED | OUTPATIENT
Start: 2023-11-29 | End: 2023-11-29

## 2023-11-29 RX ORDER — PHENYLEPHRINE HCL IN 0.9% NACL 1 MG/10 ML
SYRINGE (ML) INTRAVENOUS AS NEEDED
Status: DISCONTINUED | OUTPATIENT
Start: 2023-11-29 | End: 2023-11-29

## 2023-11-29 RX ORDER — MELOXICAM 7.5 MG/1
7.5 TABLET ORAL ONCE
Status: COMPLETED | OUTPATIENT
Start: 2023-11-29 | End: 2023-11-29

## 2023-11-29 RX ORDER — BISACODYL 5 MG
10 TABLET, DELAYED RELEASE (ENTERIC COATED) ORAL DAILY PRN
Status: DISCONTINUED | OUTPATIENT
Start: 2023-11-29 | End: 2023-11-30 | Stop reason: HOSPADM

## 2023-11-29 RX ORDER — TRIAMCINOLONE ACETONIDE 40 MG/ML
INJECTION, SUSPENSION INTRA-ARTICULAR; INTRAMUSCULAR AS NEEDED
Status: DISCONTINUED | OUTPATIENT
Start: 2023-11-29 | End: 2023-11-29 | Stop reason: HOSPADM

## 2023-11-29 RX ORDER — SODIUM CHLORIDE 0.9 G/100ML
IRRIGANT IRRIGATION AS NEEDED
Status: DISCONTINUED | OUTPATIENT
Start: 2023-11-29 | End: 2023-11-29 | Stop reason: HOSPADM

## 2023-11-29 RX ORDER — DIPHENHYDRAMINE HCL 12.5MG/5ML
12.5 LIQUID (ML) ORAL EVERY 6 HOURS PRN
Status: DISCONTINUED | OUTPATIENT
Start: 2023-11-29 | End: 2023-11-30 | Stop reason: HOSPADM

## 2023-11-29 RX ORDER — ALBUTEROL SULFATE 0.83 MG/ML
2.5 SOLUTION RESPIRATORY (INHALATION) EVERY 6 HOURS PRN
Status: DISCONTINUED | OUTPATIENT
Start: 2023-11-29 | End: 2023-11-30 | Stop reason: HOSPADM

## 2023-11-29 RX ORDER — SODIUM CHLORIDE, SODIUM LACTATE, POTASSIUM CHLORIDE, CALCIUM CHLORIDE 600; 310; 30; 20 MG/100ML; MG/100ML; MG/100ML; MG/100ML
100 INJECTION, SOLUTION INTRAVENOUS CONTINUOUS
Status: DISCONTINUED | OUTPATIENT
Start: 2023-11-29 | End: 2023-11-30 | Stop reason: HOSPADM

## 2023-11-29 RX ORDER — SODIUM CHLORIDE, SODIUM LACTATE, POTASSIUM CHLORIDE, CALCIUM CHLORIDE 600; 310; 30; 20 MG/100ML; MG/100ML; MG/100ML; MG/100ML
50 INJECTION, SOLUTION INTRAVENOUS CONTINUOUS
Status: ACTIVE | OUTPATIENT
Start: 2023-11-29 | End: 2023-11-30

## 2023-11-29 RX ORDER — LIDOCAINE HYDROCHLORIDE 10 MG/ML
INJECTION INFILTRATION; PERINEURAL AS NEEDED
Status: DISCONTINUED | OUTPATIENT
Start: 2023-11-29 | End: 2023-11-29 | Stop reason: HOSPADM

## 2023-11-29 RX ORDER — ACETAMINOPHEN 500 MG
1000 TABLET ORAL EVERY 6 HOURS
Qty: 240 TABLET | Refills: 0 | Status: SHIPPED | OUTPATIENT
Start: 2023-11-29 | End: 2023-12-30

## 2023-11-29 RX ORDER — OXYCODONE HYDROCHLORIDE 5 MG/1
5 TABLET ORAL EVERY 6 HOURS PRN
Qty: 28 TABLET | Refills: 0 | Status: SHIPPED | OUTPATIENT
Start: 2023-11-29 | End: 2023-12-07

## 2023-11-29 RX ORDER — CEFAZOLIN SODIUM 2 G/100ML
2 INJECTION, SOLUTION INTRAVENOUS EVERY 8 HOURS
Status: COMPLETED | OUTPATIENT
Start: 2023-11-29 | End: 2023-11-30

## 2023-11-29 RX ORDER — ONDANSETRON 4 MG/1
4 TABLET, FILM COATED ORAL EVERY 8 HOURS PRN
Qty: 30 TABLET | Refills: 0 | Status: SHIPPED | OUTPATIENT
Start: 2023-11-29 | End: 2023-12-29

## 2023-11-29 RX ORDER — MONTELUKAST SODIUM 10 MG/1
10 TABLET ORAL NIGHTLY
Status: DISCONTINUED | OUTPATIENT
Start: 2023-11-29 | End: 2023-11-30 | Stop reason: HOSPADM

## 2023-11-29 RX ORDER — ALBUTEROL SULFATE 90 UG/1
1 AEROSOL, METERED RESPIRATORY (INHALATION) EVERY 6 HOURS PRN
Status: DISCONTINUED | OUTPATIENT
Start: 2023-11-29 | End: 2023-11-29

## 2023-11-29 RX ORDER — PANTOPRAZOLE SODIUM 40 MG/1
40 TABLET, DELAYED RELEASE ORAL
Qty: 30 TABLET | Refills: 0 | Status: SHIPPED | OUTPATIENT
Start: 2023-11-29 | End: 2023-12-30

## 2023-11-29 RX ORDER — ACETAMINOPHEN 325 MG/1
650 TABLET ORAL EVERY 6 HOURS PRN
Status: DISCONTINUED | OUTPATIENT
Start: 2023-11-29 | End: 2023-11-30 | Stop reason: HOSPADM

## 2023-11-29 RX ORDER — MIDAZOLAM HYDROCHLORIDE 1 MG/ML
INJECTION, SOLUTION INTRAMUSCULAR; INTRAVENOUS AS NEEDED
Status: DISCONTINUED | OUTPATIENT
Start: 2023-11-29 | End: 2023-11-29

## 2023-11-29 RX ORDER — OXYCODONE HYDROCHLORIDE 5 MG/1
5 TABLET ORAL EVERY 4 HOURS PRN
Status: DISCONTINUED | OUTPATIENT
Start: 2023-11-29 | End: 2023-11-30 | Stop reason: HOSPADM

## 2023-11-29 RX ORDER — TRANEXAMIC ACID 100 MG/ML
INJECTION, SOLUTION INTRAVENOUS AS NEEDED
Status: DISCONTINUED | OUTPATIENT
Start: 2023-11-29 | End: 2023-11-29

## 2023-11-29 RX ORDER — MINOXIDIL 2.5 MG/1
1.25 TABLET ORAL DAILY
Status: DISCONTINUED | OUTPATIENT
Start: 2023-11-29 | End: 2023-11-30 | Stop reason: HOSPADM

## 2023-11-29 RX ORDER — PHENYLEPHRINE 10 MG/250 ML(40 MCG/ML)IN 0.9 % SOD.CHLORIDE INTRAVENOUS
CONTINUOUS PRN
Status: DISCONTINUED | OUTPATIENT
Start: 2023-11-29 | End: 2023-11-29

## 2023-11-29 RX ORDER — ASPIRIN 81 MG/1
81 TABLET ORAL 2 TIMES DAILY
Qty: 60 TABLET | Refills: 0 | Status: SHIPPED | OUTPATIENT
Start: 2023-11-29 | End: 2023-12-30

## 2023-11-29 RX ORDER — BISACODYL 10 MG/1
10 SUPPOSITORY RECTAL DAILY PRN
Status: DISCONTINUED | OUTPATIENT
Start: 2023-11-29 | End: 2023-11-30 | Stop reason: HOSPADM

## 2023-11-29 RX ORDER — DOCUSATE SODIUM 100 MG/1
100 CAPSULE, LIQUID FILLED ORAL 2 TIMES DAILY
Status: DISCONTINUED | OUTPATIENT
Start: 2023-11-29 | End: 2023-11-30 | Stop reason: HOSPADM

## 2023-11-29 RX ORDER — AMITRIPTYLINE HYDROCHLORIDE 10 MG/1
10 TABLET, FILM COATED ORAL NIGHTLY
Status: DISCONTINUED | OUTPATIENT
Start: 2023-11-29 | End: 2023-11-30 | Stop reason: HOSPADM

## 2023-11-29 RX ORDER — CEFAZOLIN SODIUM 2 G/100ML
2 INJECTION, SOLUTION INTRAVENOUS ONCE
Status: DISCONTINUED | OUTPATIENT
Start: 2023-11-29 | End: 2023-11-29

## 2023-11-29 RX ORDER — CYCLOBENZAPRINE HCL 5 MG
5 TABLET ORAL 3 TIMES DAILY PRN
Status: DISCONTINUED | OUTPATIENT
Start: 2023-11-29 | End: 2023-11-30 | Stop reason: HOSPADM

## 2023-11-29 RX ORDER — ONDANSETRON 4 MG/1
4 TABLET, FILM COATED ORAL EVERY 8 HOURS PRN
Status: DISCONTINUED | OUTPATIENT
Start: 2023-11-29 | End: 2023-11-30 | Stop reason: HOSPADM

## 2023-11-29 RX ORDER — CEFAZOLIN SODIUM 1 G/50ML
SOLUTION INTRAVENOUS AS NEEDED
Status: DISCONTINUED | OUTPATIENT
Start: 2023-11-29 | End: 2023-11-29

## 2023-11-29 RX ORDER — KETOROLAC TROMETHAMINE 10 MG/1
10 TABLET, FILM COATED ORAL EVERY 6 HOURS PRN
Qty: 12 TABLET | Refills: 0 | Status: SHIPPED | OUTPATIENT
Start: 2023-11-29

## 2023-11-29 RX ADMIN — MELOXICAM 7.5 MG: 7.5 TABLET ORAL at 11:50

## 2023-11-29 RX ADMIN — SODIUM CHLORIDE, POTASSIUM CHLORIDE, SODIUM LACTATE AND CALCIUM CHLORIDE 100 ML/HR: 600; 310; 30; 20 INJECTION, SOLUTION INTRAVENOUS at 11:50

## 2023-11-29 RX ADMIN — Medication 500 MCG: at 15:20

## 2023-11-29 RX ADMIN — ALBUMIN HUMAN 250 ML: 0.05 INJECTION, SOLUTION INTRAVENOUS at 15:36

## 2023-11-29 RX ADMIN — ALBUMIN HUMAN 250 ML: 0.05 INJECTION, SOLUTION INTRAVENOUS at 14:51

## 2023-11-29 RX ADMIN — CEFAZOLIN SODIUM 2 G: 2 INJECTION, SOLUTION INTRAVENOUS at 21:27

## 2023-11-29 RX ADMIN — OXYCODONE HYDROCHLORIDE 10 MG: 5 TABLET ORAL at 16:49

## 2023-11-29 RX ADMIN — SODIUM CHLORIDE, POTASSIUM CHLORIDE, SODIUM LACTATE AND CALCIUM CHLORIDE 50 ML/HR: 600; 310; 30; 20 INJECTION, SOLUTION INTRAVENOUS at 17:15

## 2023-11-29 RX ADMIN — SODIUM CHLORIDE, POTASSIUM CHLORIDE, SODIUM LACTATE AND CALCIUM CHLORIDE 100 ML/HR: 600; 310; 30; 20 INJECTION, SOLUTION INTRAVENOUS at 16:15

## 2023-11-29 RX ADMIN — MONTELUKAST 10 MG: 10 TABLET, FILM COATED ORAL at 21:26

## 2023-11-29 RX ADMIN — Medication 3 L/MIN: at 16:30

## 2023-11-29 RX ADMIN — Medication 0.4 MCG/KG/MIN: at 14:41

## 2023-11-29 RX ADMIN — Medication 200 MCG: at 14:30

## 2023-11-29 RX ADMIN — PROPOFOL 100 MCG/KG/MIN: 10 INJECTION, EMULSION INTRAVENOUS at 14:13

## 2023-11-29 RX ADMIN — OXYCODONE HYDROCHLORIDE 10 MG: 5 TABLET ORAL at 21:28

## 2023-11-29 RX ADMIN — KETOROLAC TROMETHAMINE 15 MG: 30 INJECTION, SOLUTION INTRAMUSCULAR at 18:09

## 2023-11-29 RX ADMIN — ACETAMINOPHEN 975 MG: 325 TABLET ORAL at 11:50

## 2023-11-29 RX ADMIN — KETOROLAC TROMETHAMINE 15 MG: 30 INJECTION, SOLUTION INTRAMUSCULAR at 23:26

## 2023-11-29 RX ADMIN — MEPIVACAINE HYDROCHLORIDE 3.5 ML: 15 INJECTION, SOLUTION EPIDURAL; INFILTRATION at 14:00

## 2023-11-29 RX ADMIN — ALBUMIN HUMAN 250 ML: 0.05 INJECTION, SOLUTION INTRAVENOUS at 15:23

## 2023-11-29 RX ADMIN — Medication 200 MCG: at 14:16

## 2023-11-29 RX ADMIN — Medication 200 MCG: at 14:23

## 2023-11-29 RX ADMIN — CYCLOBENZAPRINE HYDROCHLORIDE 5 MG: 5 TABLET, FILM COATED ORAL at 18:09

## 2023-11-29 RX ADMIN — ONDANSETRON 4 MG: 2 INJECTION INTRAMUSCULAR; INTRAVENOUS at 15:05

## 2023-11-29 RX ADMIN — HYDROMORPHONE HYDROCHLORIDE 0.5 MG: 1 INJECTION, SOLUTION INTRAMUSCULAR; INTRAVENOUS; SUBCUTANEOUS at 16:26

## 2023-11-29 RX ADMIN — MIDAZOLAM HYDROCHLORIDE 2 MG: 1 INJECTION, SOLUTION INTRAMUSCULAR; INTRAVENOUS at 13:50

## 2023-11-29 RX ADMIN — Medication 200 MCG: at 14:43

## 2023-11-29 RX ADMIN — FENTANYL CITRATE 50 MCG: 50 INJECTION, SOLUTION INTRAMUSCULAR; INTRAVENOUS at 13:55

## 2023-11-29 RX ADMIN — POVIDONE-IODINE 1 APPLICATION: 5 SOLUTION TOPICAL at 11:50

## 2023-11-29 RX ADMIN — Medication 300 MCG: at 15:17

## 2023-11-29 RX ADMIN — FENTANYL CITRATE 50 MCG: 50 INJECTION, SOLUTION INTRAMUSCULAR; INTRAVENOUS at 13:56

## 2023-11-29 RX ADMIN — PREGABALIN 75 MG: 75 CAPSULE ORAL at 11:50

## 2023-11-29 RX ADMIN — TRANEXAMIC ACID 1000 MG: 1 INJECTION, SOLUTION INTRAVENOUS at 14:09

## 2023-11-29 RX ADMIN — SODIUM CHLORIDE: 9 INJECTION, SOLUTION INTRAVENOUS at 14:36

## 2023-11-29 RX ADMIN — AMITRIPTYLINE HYDROCHLORIDE 10 MG: 10 TABLET, FILM COATED ORAL at 21:26

## 2023-11-29 RX ADMIN — CEFAZOLIN SODIUM 2 G: 1 INJECTION, SOLUTION INTRAVENOUS at 14:07

## 2023-11-29 RX ADMIN — DOCUSATE SODIUM 100 MG: 100 CAPSULE, LIQUID FILLED ORAL at 21:26

## 2023-11-29 RX ADMIN — PROPOFOL 40 MG: 10 INJECTION, EMULSION INTRAVENOUS at 15:27

## 2023-11-29 RX ADMIN — SODIUM CHLORIDE, SODIUM LACTATE, POTASSIUM CHLORIDE, AND CALCIUM CHLORIDE: 600; 310; 30; 20 INJECTION, SOLUTION INTRAVENOUS at 13:52

## 2023-11-29 RX ADMIN — Medication 200 MCG: at 14:37

## 2023-11-29 RX ADMIN — Medication 200 MCG: at 15:15

## 2023-11-29 SDOH — SOCIAL STABILITY: SOCIAL INSECURITY: HAVE YOU HAD THOUGHTS OF HARMING ANYONE ELSE?: YES

## 2023-11-29 SDOH — SOCIAL STABILITY: SOCIAL INSECURITY: HAS ANYONE EVER THREATENED TO HURT YOUR FAMILY OR YOUR PETS?: NO

## 2023-11-29 SDOH — SOCIAL STABILITY: SOCIAL INSECURITY: DO YOU FEEL ANYONE HAS EXPLOITED OR TAKEN ADVANTAGE OF YOU FINANCIALLY OR OF YOUR PERSONAL PROPERTY?: NO

## 2023-11-29 SDOH — SOCIAL STABILITY: SOCIAL INSECURITY: DO YOU FEEL UNSAFE GOING BACK TO THE PLACE WHERE YOU ARE LIVING?: NO

## 2023-11-29 SDOH — SOCIAL STABILITY: SOCIAL INSECURITY: ARE THERE ANY APPARENT SIGNS OF INJURIES/BEHAVIORS THAT COULD BE RELATED TO ABUSE/NEGLECT?: NO

## 2023-11-29 SDOH — SOCIAL STABILITY: SOCIAL INSECURITY: DOES ANYONE TRY TO KEEP YOU FROM HAVING/CONTACTING OTHER FRIENDS OR DOING THINGS OUTSIDE YOUR HOME?: NO

## 2023-11-29 SDOH — SOCIAL STABILITY: SOCIAL INSECURITY: WERE YOU ABLE TO COMPLETE ALL THE BEHAVIORAL HEALTH SCREENINGS?: YES

## 2023-11-29 SDOH — SOCIAL STABILITY: SOCIAL INSECURITY: ARE YOU OR HAVE YOU BEEN THREATENED OR ABUSED PHYSICALLY, EMOTIONALLY, OR SEXUALLY BY ANYONE?: NO

## 2023-11-29 SDOH — SOCIAL STABILITY: SOCIAL INSECURITY: ABUSE: ADULT

## 2023-11-29 ASSESSMENT — COGNITIVE AND FUNCTIONAL STATUS - GENERAL
CLIMB 3 TO 5 STEPS WITH RAILING: A LOT
TOILETING: A LITTLE
MOVING TO AND FROM BED TO CHAIR: A LOT
MOVING FROM LYING ON BACK TO SITTING ON SIDE OF FLAT BED WITH BEDRAILS: A LITTLE
PATIENT BASELINE BEDBOUND: NO
MOVING FROM LYING ON BACK TO SITTING ON SIDE OF FLAT BED WITH BEDRAILS: A LITTLE
MOBILITY SCORE: 14
MOBILITY SCORE: 14
WALKING IN HOSPITAL ROOM: A LOT
TURNING FROM BACK TO SIDE WHILE IN FLAT BAD: A LITTLE
CLIMB 3 TO 5 STEPS WITH RAILING: A LOT
MOVING TO AND FROM BED TO CHAIR: A LOT
TOILETING: A LITTLE
DAILY ACTIVITIY SCORE: 22
STANDING UP FROM CHAIR USING ARMS: A LOT
STANDING UP FROM CHAIR USING ARMS: A LOT
TURNING FROM BACK TO SIDE WHILE IN FLAT BAD: A LITTLE
DAILY ACTIVITIY SCORE: 22
DRESSING REGULAR LOWER BODY CLOTHING: A LITTLE
DRESSING REGULAR LOWER BODY CLOTHING: A LITTLE
WALKING IN HOSPITAL ROOM: A LOT

## 2023-11-29 ASSESSMENT — LIFESTYLE VARIABLES
HOW OFTEN DO YOU HAVE 6 OR MORE DRINKS ON ONE OCCASION: NEVER
HOW OFTEN DO YOU HAVE A DRINK CONTAINING ALCOHOL: NEVER
AUDIT-C TOTAL SCORE: 1
HOW MANY STANDARD DRINKS CONTAINING ALCOHOL DO YOU HAVE ON A TYPICAL DAY: 3 OR 4
SKIP TO QUESTIONS 9-10: 0
AUDIT-C TOTAL SCORE: 1

## 2023-11-29 ASSESSMENT — PAIN - FUNCTIONAL ASSESSMENT
PAIN_FUNCTIONAL_ASSESSMENT: 0-10

## 2023-11-29 ASSESSMENT — PAIN SCALES - GENERAL
PAINLEVEL_OUTOF10: 0 - NO PAIN
PAINLEVEL_OUTOF10: 8
PAINLEVEL_OUTOF10: 5 - MODERATE PAIN
PAINLEVEL_OUTOF10: 0 - NO PAIN
PAINLEVEL_OUTOF10: 7
PAINLEVEL_OUTOF10: 5 - MODERATE PAIN
PAINLEVEL_OUTOF10: 0 - NO PAIN
PAINLEVEL_OUTOF10: 7
PAIN_LEVEL: 0
PAINLEVEL_OUTOF10: 7
PAINLEVEL_OUTOF10: 0 - NO PAIN

## 2023-11-29 ASSESSMENT — PATIENT HEALTH QUESTIONNAIRE - PHQ9
1. LITTLE INTEREST OR PLEASURE IN DOING THINGS: NOT AT ALL
2. FEELING DOWN, DEPRESSED OR HOPELESS: NOT AT ALL
SUM OF ALL RESPONSES TO PHQ9 QUESTIONS 1 & 2: 0

## 2023-11-29 ASSESSMENT — ACTIVITIES OF DAILY LIVING (ADL)
FEEDING YOURSELF: INDEPENDENT
JUDGMENT_ADEQUATE_SAFELY_COMPLETE_DAILY_ACTIVITIES: YES
ADEQUATE_TO_COMPLETE_ADL: YES
BATHING: INDEPENDENT
HEARING - LEFT EAR: FUNCTIONAL
DRESSING YOURSELF: NEEDS ASSISTANCE
WALKS IN HOME: INDEPENDENT
PATIENT'S MEMORY ADEQUATE TO SAFELY COMPLETE DAILY ACTIVITIES?: YES
LACK_OF_TRANSPORTATION: NO
GROOMING: INDEPENDENT
TOILETING: NEEDS ASSISTANCE
ASSISTIVE_DEVICE: WALKER
HEARING - RIGHT EAR: FUNCTIONAL

## 2023-11-29 ASSESSMENT — COLUMBIA-SUICIDE SEVERITY RATING SCALE - C-SSRS
6. HAVE YOU EVER DONE ANYTHING, STARTED TO DO ANYTHING, OR PREPARED TO DO ANYTHING TO END YOUR LIFE?: NO
1. IN THE PAST MONTH, HAVE YOU WISHED YOU WERE DEAD OR WISHED YOU COULD GO TO SLEEP AND NOT WAKE UP?: NO
2. HAVE YOU ACTUALLY HAD ANY THOUGHTS OF KILLING YOURSELF?: NO

## 2023-11-29 ASSESSMENT — PAIN DESCRIPTION - DESCRIPTORS
DESCRIPTORS: ACHING

## 2023-11-29 NOTE — DISCHARGE INSTRUCTIONS
Additional instructions  Ice/Elevate operative extremity.  Keep dressing clean and dry.    Keep dressing in place.  Weightbearing: WBAT on operative extremity with crutches/walker.   Oxycodone by mouth every 6 hours as needed for pain.  Start Tylenol by mouth every 6 hours as needed for pain.  Aspirin by mouth twice daily.  F/U with Dr. Rod in 2 weeks.

## 2023-11-29 NOTE — ANESTHESIA PROCEDURE NOTES
Peripheral Block    Reason for block: at surgeon's request and post-op pain management  Staffing  Performed: attending   Authorized by: PILLO Gifford    Performed by: Alejandro Aleman MD  Peripheral Block  Patient position: laying flat  Prep: ChloraPrep  Patient monitoring: continuous pulse ox  Block type: fascia iliaca  Injection technique: single-shot  Guidance: ultrasound guided  Infiltration strength: 0.2 %  Dose: 40 mL  Needle  Needle type: Quincke   Needle gauge: 22 G  Assessment  Injection assessment: negative aspiration for heme, no paresthesia on injection, incremental injection and local visualized surrounding nerve on ultrasound

## 2023-11-29 NOTE — ANESTHESIA PREPROCEDURE EVALUATION
Patient: Kiara Leyva    Procedure Information       Anesthesia Start Date/Time: 11/29/23 1352    Procedure: Left Hip Total Replacement, Left knee cortisone injection (Left: Hip)    Location: [facility] A OR 18 / Virtual [facility] A OR    Surgeons: Guru Rod MD            Relevant Problems   Cardiovascular   (+) Hypertension      GI   (+) Esophageal reflux      Musculoskeletal   (+) Localized osteoarthritis of hip   (+) OA (osteoarthritis) of hip   (+) OA (osteoarthritis) of knee   (+) Osteoarthritis of lower back   (+) Primary osteoarthritis of left hip   (+) Primary osteoarthritis of right hip      Infectious Disease   (+) Vaginal candidiasis      Other   (+) Allergic arthritis of left hip       Clinical information reviewed:   Tobacco  Allergies  Meds   Med Hx  Surg Hx  OB Status  Fam Hx  Soc   Hx        NPO Detail:  NPO/Void Status  Carbonhydrate Drink Given Prior to Surgery? : N  Date of Last Liquid: 11/29/23  Time of Last Liquid: 0700  Date of Last Solid: 11/28/23  Time of Last Solid: 2200  Last Intake Type: Clear fluids  Time of Last Void: 1100         Physical Exam    Airway  Mallampati: II  TM distance: >3 FB  Neck ROM: full     Cardiovascular - normal exam     Dental    Pulmonary - normal exam     Abdominal - normal exam             Anesthesia Plan    ASA 2     spinal   (Preop block consented.)  Anesthetic plan and risks discussed with patient.    Plan discussed with CRNA.

## 2023-11-29 NOTE — PERIOPERATIVE NURSING NOTE
1556: Patient to bay with anesthesia present, plan of care reviewed. Report received from PERRY OBANDO. Initial assessment complete.    1600: PATIENT FAMILY UPDATED VIA TEXT MESSAGING.    1610: Patient tolerating jello and sips of water.     1615:Handoff report given to Veena RUFF

## 2023-11-29 NOTE — ANESTHESIA PROCEDURE NOTES
Spinal Block    Patient location during procedure: OR  Start time: 11/29/2023 1:55 PM  End time: 11/29/2023 2:03 PM  Reason for block: primary anesthetic  Staffing  Performed: PILLO   Authorized by: Alejandro Aleman MD    Performed by: PILLO Gifford    Preanesthetic Checklist  Completed: patient identified, IV checked, site marked, risks and benefits discussed, surgical consent, monitors and equipment checked, pre-op evaluation, timeout performed and sterile techniques followed  Block Timeout  RN/Licensed healthcare professional reads aloud to the Anesthesia provider and entire team: Patient identity, procedure with side and site, patient position, and as applicable the availability of implants/special equipment/special requirements.  Patient on coagulant treatment: no  Timeout performed at: 11/29/2023 1:54 PM  Spinal Block  Patient position: sitting  Prep: Betadine  Sterility prep: cap, drape, gloves, gown, hand hygiene and mask  Sedation level: moderate sedation  Patient monitoring: blood pressure, continuous pulse oximetry and heart rate  Vertebral space: L4-5  Needle  Needle type: pencil-point   Needle gauge: 24 G  Needle length: 4 in  Free flowing CSF: yes    Assessment  Sensory level: T6  Block outcome: block to be assessed in the OR  Procedure assessment: patient sedated but conversant throughout procedure and patient tolerated procedure well with no immediate complications

## 2023-11-29 NOTE — ANESTHESIA POSTPROCEDURE EVALUATION
Patient: Kiara Leyva    Procedure Summary       Date: 11/29/23 Room / Location: [facility] OR 18 / [facility] OR    Anesthesia Start: 1352 Anesthesia Stop: 1559    Procedure: Left Hip Total Replacement, Left knee cortisone injection (Left: Hip) Diagnosis:       Primary osteoarthritis of left hip      (LEFT HIP ARTHRITIS M16.12)    Surgeons: Guru Rod MD Responsible Provider: Bhavesh Dodd MD    Anesthesia Type: spinal ASA Status: 2            Anesthesia Type: No value filed.    Vitals Value Taken Time   /71 11/29/23 1630   Temp 36.6 °C (97.9 °F) 11/29/23 1615   Pulse 98 11/29/23 1636   Resp 22 11/29/23 1636   SpO2 99 % 11/29/23 1636   Vitals shown include unvalidated device data.    Anesthesia Post Evaluation    Patient location during evaluation: bedside  Patient participation: complete - patient cannot participate  Level of consciousness: awake  Pain score: 0  Pain management: adequate  Airway patency: patent  Cardiovascular status: acceptable  Respiratory status: acceptable  Hydration status: acceptable  Postoperative Nausea and Vomiting: none        No notable events documented.

## 2023-11-29 NOTE — PERIOPERATIVE NURSING NOTE
1615 Assuming care of pt at this time. Bedside report received from outgoing RN.   1626 0.5mg diluadid admin per order.   1630 pt resting quietly and provided with warm blankets. will continue to monitor following admin of IVP medications. Placed onto 3L NC.  1652 pt jose Goodrich called and updated on plan of care.  1655 report sent to room 706 RN   1700 pt resting quietly, reporting dec in pain.   1706 pt meeting criteria for phase 1 dc. Pt aware of awaiting transport to floor.   1712 Pt taken up to room via transport at this time in stable condition. Report previously sent to receiving RN. All belongings taken with pt to room, family updated on pt status.

## 2023-11-30 ENCOUNTER — HOME HEALTH ADMISSION (OUTPATIENT)
Dept: HOME HEALTH SERVICES | Facility: HOME HEALTH | Age: 63
End: 2023-11-30
Payer: COMMERCIAL

## 2023-11-30 VITALS
WEIGHT: 176.15 LBS | RESPIRATION RATE: 16 BRPM | DIASTOLIC BLOOD PRESSURE: 72 MMHG | HEIGHT: 65 IN | BODY MASS INDEX: 29.35 KG/M2 | SYSTOLIC BLOOD PRESSURE: 124 MMHG | TEMPERATURE: 98.6 F | HEART RATE: 95 BPM | OXYGEN SATURATION: 97 %

## 2023-11-30 LAB
ANION GAP SERPL CALC-SCNC: 14 MMOL/L (ref 10–20)
BUN SERPL-MCNC: 7 MG/DL (ref 6–23)
CALCIUM SERPL-MCNC: 7.9 MG/DL (ref 8.6–10.3)
CHLORIDE SERPL-SCNC: 101 MMOL/L (ref 98–107)
CO2 SERPL-SCNC: 24 MMOL/L (ref 21–32)
CREAT SERPL-MCNC: 0.57 MG/DL (ref 0.5–1.05)
ERYTHROCYTE [DISTWIDTH] IN BLOOD BY AUTOMATED COUNT: 13.2 % (ref 11.5–14.5)
GFR SERPL CREATININE-BSD FRML MDRD: >90 ML/MIN/1.73M*2
GLUCOSE SERPL-MCNC: 123 MG/DL (ref 74–99)
HCT VFR BLD AUTO: 29.7 % (ref 36–46)
HGB BLD-MCNC: 10.3 G/DL (ref 12–16)
MCH RBC QN AUTO: 28.8 PG (ref 26–34)
MCHC RBC AUTO-ENTMCNC: 34.7 G/DL (ref 32–36)
MCV RBC AUTO: 83 FL (ref 80–100)
NRBC BLD-RTO: 0 /100 WBCS (ref 0–0)
PLATELET # BLD AUTO: 233 X10*3/UL (ref 150–450)
POTASSIUM SERPL-SCNC: 4.1 MMOL/L (ref 3.5–5.3)
RBC # BLD AUTO: 3.58 X10*6/UL (ref 4–5.2)
SODIUM SERPL-SCNC: 135 MMOL/L (ref 136–145)
WBC # BLD AUTO: 7 X10*3/UL (ref 4.4–11.3)

## 2023-11-30 PROCEDURE — 36415 COLL VENOUS BLD VENIPUNCTURE: CPT | Performed by: STUDENT IN AN ORGANIZED HEALTH CARE EDUCATION/TRAINING PROGRAM

## 2023-11-30 PROCEDURE — 80048 BASIC METABOLIC PNL TOTAL CA: CPT | Performed by: STUDENT IN AN ORGANIZED HEALTH CARE EDUCATION/TRAINING PROGRAM

## 2023-11-30 PROCEDURE — 2500000004 HC RX 250 GENERAL PHARMACY W/ HCPCS (ALT 636 FOR OP/ED): Performed by: STUDENT IN AN ORGANIZED HEALTH CARE EDUCATION/TRAINING PROGRAM

## 2023-11-30 PROCEDURE — 97530 THERAPEUTIC ACTIVITIES: CPT | Mod: GP

## 2023-11-30 PROCEDURE — 85027 COMPLETE CBC AUTOMATED: CPT | Performed by: STUDENT IN AN ORGANIZED HEALTH CARE EDUCATION/TRAINING PROGRAM

## 2023-11-30 PROCEDURE — 2500000001 HC RX 250 WO HCPCS SELF ADMINISTERED DRUGS (ALT 637 FOR MEDICARE OP): Performed by: STUDENT IN AN ORGANIZED HEALTH CARE EDUCATION/TRAINING PROGRAM

## 2023-11-30 PROCEDURE — 97110 THERAPEUTIC EXERCISES: CPT | Mod: GP

## 2023-11-30 PROCEDURE — 99232 SBSQ HOSP IP/OBS MODERATE 35: CPT

## 2023-11-30 PROCEDURE — G0378 HOSPITAL OBSERVATION PER HR: HCPCS

## 2023-11-30 PROCEDURE — 96376 TX/PRO/DX INJ SAME DRUG ADON: CPT

## 2023-11-30 PROCEDURE — 97165 OT EVAL LOW COMPLEX 30 MIN: CPT | Mod: GO

## 2023-11-30 PROCEDURE — 97116 GAIT TRAINING THERAPY: CPT | Mod: GP

## 2023-11-30 PROCEDURE — 2500000004 HC RX 250 GENERAL PHARMACY W/ HCPCS (ALT 636 FOR OP/ED)

## 2023-11-30 PROCEDURE — 97535 SELF CARE MNGMENT TRAINING: CPT | Mod: GO

## 2023-11-30 PROCEDURE — 97161 PT EVAL LOW COMPLEX 20 MIN: CPT | Mod: GP

## 2023-11-30 RX ORDER — OXYBUTYNIN CHLORIDE 5 MG/1
5 TABLET ORAL 3 TIMES DAILY
Status: DISCONTINUED | OUTPATIENT
Start: 2023-11-30 | End: 2023-11-30 | Stop reason: HOSPADM

## 2023-11-30 RX ADMIN — OXYCODONE HYDROCHLORIDE 10 MG: 5 TABLET ORAL at 06:13

## 2023-11-30 RX ADMIN — DOCUSATE SODIUM 100 MG: 100 CAPSULE, LIQUID FILLED ORAL at 08:33

## 2023-11-30 RX ADMIN — CYCLOBENZAPRINE HYDROCHLORIDE 5 MG: 5 TABLET, FILM COATED ORAL at 08:33

## 2023-11-30 RX ADMIN — OXYBUTYNIN CHLORIDE 5 MG: 5 TABLET ORAL at 15:56

## 2023-11-30 RX ADMIN — ASPIRIN 81 MG: 81 TABLET, COATED ORAL at 08:33

## 2023-11-30 RX ADMIN — OXYCODONE HYDROCHLORIDE 10 MG: 5 TABLET ORAL at 01:53

## 2023-11-30 RX ADMIN — PANTOPRAZOLE SODIUM 40 MG: 40 TABLET, DELAYED RELEASE ORAL at 06:11

## 2023-11-30 RX ADMIN — MINOXIDIL 1.25 MG: 2.5 TABLET ORAL at 08:34

## 2023-11-30 RX ADMIN — OXYBUTYNIN CHLORIDE 5 MG: 5 TABLET ORAL at 11:04

## 2023-11-30 RX ADMIN — OXYCODONE HYDROCHLORIDE 10 MG: 5 TABLET ORAL at 10:42

## 2023-11-30 RX ADMIN — POLYETHYLENE GLYCOL 3350 17 G: 17 POWDER, FOR SOLUTION ORAL at 08:33

## 2023-11-30 RX ADMIN — CEFAZOLIN SODIUM 2 G: 2 INJECTION, SOLUTION INTRAVENOUS at 06:11

## 2023-11-30 RX ADMIN — OXYCODONE HYDROCHLORIDE 10 MG: 5 TABLET ORAL at 15:56

## 2023-11-30 RX ADMIN — KETOROLAC TROMETHAMINE 15 MG: 30 INJECTION, SOLUTION INTRAMUSCULAR at 10:42

## 2023-11-30 RX ADMIN — KETOROLAC TROMETHAMINE 15 MG: 30 INJECTION, SOLUTION INTRAMUSCULAR at 06:11

## 2023-11-30 ASSESSMENT — PAIN SCALES - GENERAL
PAINLEVEL_OUTOF10: 7
PAINLEVEL_OUTOF10: 4
PAINLEVEL_OUTOF10: 5 - MODERATE PAIN
PAINLEVEL_OUTOF10: 4
PAINLEVEL_OUTOF10: 7

## 2023-11-30 ASSESSMENT — COGNITIVE AND FUNCTIONAL STATUS - GENERAL
DAILY ACTIVITIY SCORE: 20
WALKING IN HOSPITAL ROOM: A LITTLE
MOVING TO AND FROM BED TO CHAIR: A LITTLE
STANDING UP FROM CHAIR USING ARMS: A LITTLE
CLIMB 3 TO 5 STEPS WITH RAILING: A LITTLE
DRESSING REGULAR UPPER BODY CLOTHING: A LITTLE
MOBILITY SCORE: 17
TOILETING: A LITTLE
TURNING FROM BACK TO SIDE WHILE IN FLAT BAD: A LITTLE
HELP NEEDED FOR BATHING: A LITTLE
TURNING FROM BACK TO SIDE WHILE IN FLAT BAD: A LITTLE
STANDING UP FROM CHAIR USING ARMS: A LITTLE
WALKING IN HOSPITAL ROOM: A LITTLE
MOBILITY SCORE: 18
MOVING FROM LYING ON BACK TO SITTING ON SIDE OF FLAT BED WITH BEDRAILS: A LITTLE
MOVING FROM LYING ON BACK TO SITTING ON SIDE OF FLAT BED WITH BEDRAILS: A LITTLE
MOVING TO AND FROM BED TO CHAIR: A LITTLE
CLIMB 3 TO 5 STEPS WITH RAILING: A LOT
DRESSING REGULAR LOWER BODY CLOTHING: A LITTLE

## 2023-11-30 ASSESSMENT — PAIN - FUNCTIONAL ASSESSMENT
PAIN_FUNCTIONAL_ASSESSMENT: 0-10

## 2023-11-30 ASSESSMENT — ACTIVITIES OF DAILY LIVING (ADL)
BATHING_WHERE_ASSESSED: STANDING SINKSIDE
BATHING_ASSISTANCE: MODERATE
HOME_MANAGEMENT_TIME_ENTRY: 38
ADL_ASSISTANCE: INDEPENDENT
ADL_ASSISTANCE: INDEPENDENT
BATHING_LEVEL_OF_ASSISTANCE: MINIMUM ASSISTANCE

## 2023-11-30 NOTE — DISCHARGE SUMMARY
Discharge Diagnosis  Allergic arthritis of left hip    Issues Requiring Follow-Up  POV    Test Results Pending At Discharge  Pending Labs       No current pending labs.            Hospital Course  63 yr old female with end stage osteoarthritis left hip s/p total left hip replacement on 11/30/23 with Dr. Rod. Please see operative report for full details. Patient tolerated the procedure well and recovered briefly in PACU before being transitioned to regular nursing floor. Post-op course was uncomplicated. Diet was advanced as tolerated.  IV medication transitioned to oral as diet advanced. On the day of discharge, the pt was tolerating a diet, pain was controlled on PO pain medication, and they were ambulating and voiding spontaneously. They were discharged 11/30/23 in stable condition with instructions to follow up as outpatient.    Pertinent Physical Exam At Time of Discharge  Physical Exam  Constitutional:       Appearance: Normal appearance.   Eyes:      Extraocular Movements: Extraocular movements intact.      Pupils: Pupils are equal, round, and reactive to light.   Cardiovascular:      Rate and Rhythm: Normal rate and regular rhythm.      Pulses: Normal pulses.      Heart sounds: Normal heart sounds. No murmur heard.  Pulmonary:      Effort: Pulmonary effort is normal.      Breath sounds: Normal breath sounds.   Abdominal:      General: Abdomen is flat. Bowel sounds are normal. There is no distension.      Palpations: Abdomen is soft.      Tenderness: There is no abdominal tenderness.   Musculoskeletal:         General: Tenderness (appropiate) present. No swelling. Normal range of motion.      Comments: Mepilex in place. Neruovascually intact. Wiggles toes, good strength on ankle dorsi and plantarflexion. +2 DP/PT. Cap refill <2. OOB and ambulating   Skin:     General: Skin is warm and dry.      Capillary Refill: Capillary refill takes less than 2 seconds.   Neurological:      General: No focal deficit  present.      Mental Status: She is alert. Mental status is at baseline.   Psychiatric:         Mood and Affect: Mood normal.         Behavior: Behavior normal.         Thought Content: Thought content normal.         Judgment: Judgment normal.         Home Medications     Medication List      START taking these medications     acetaminophen 500 mg tablet; Commonly known as: Tylenol; Take 2 tablets   (1,000 mg) by mouth every 6 hours.   aspirin 81 mg EC tablet; Take 1 tablet (81 mg) by mouth 2 times a day.   docusate sodium 100 mg capsule; Commonly known as: Colace; Take 1   capsule (100 mg) by mouth 2 times a day.   ketorolac 10 mg tablet; Commonly known as: Toradol; Take 1 tablet (10   mg) by mouth every 6 hours if needed for moderate pain (4 - 6).   ondansetron 4 mg tablet; Commonly known as: Zofran; Take 1 tablet (4 mg)   by mouth every 8 hours if needed for nausea or vomiting.   oxyCODONE 5 mg immediate release tablet; Commonly known as: Roxicodone;   Take 1 tablet (5 mg) by mouth every 6 hours if needed for severe pain (7 -   10) for up to 7 days.   pantoprazole 40 mg EC tablet; Commonly known as: ProtoNix; Take 1 tablet   (40 mg) by mouth once daily in the morning. Take before meals. Do not   crush, chew, or split.     CONTINUE taking these medications     albuterol 90 mcg/actuation inhaler   amitriptyline 10 mg tablet; Commonly known as: Elavil   amoxicillin 500 mg capsule; Commonly known as: Amoxil   azelastine 0.05 % ophthalmic solution; Commonly known as: Optivar   biotin 5,000 mcg tablet,disintegrating   chlorhexidine 0.12 % solution; Commonly known as: Peridex; SWISH AND   SPIT 15ML FOR 30 SECONDS NIGHT PRIOR TO SURGERY AND MORNING OF SURGERY   cholecalciferol 50 mcg (2,000 unit) capsule; Commonly known as: Vitamin   D-3   clobetasol 0.05 % cream; Commonly known as: Temovate   cyanocobalamin 1,000 mcg tablet; Commonly known as: Vitamin B-12   cyclobenzaprine 10 mg tablet; Commonly known as: Flexeril    diclofenac sodium 1 % gel gel; Commonly known as: Voltaren   ergocalciferol 1.25 MG (54005 UT) capsule; Commonly known as: Vitamin   D-2; TAKE 1 CAPSULE BY MOUTH ONE TIME PER WEEK   fluticasone 50 mcg/actuation nasal spray; Commonly known as: Flonase   hydroCHLOROthiazide 25 mg tablet; Commonly known as: HYDRODiuril   minoxidil 2.5 mg tablet; Commonly known as: Loniten   montelukast 10 mg tablet; Commonly known as: Singulair   Myrbetriq 50 mg tablet extended release 24 hr 24 hr tablet; Generic   drug: mirabegron   oxybutynin XL 15 mg 24 hr tablet; Commonly known as: Ditropan-XL   simvastatin 20 mg tablet; Commonly known as: Zocor       Outpatient Follow-Up  No future appointments.    Siddharth Alegria PA-C

## 2023-11-30 NOTE — CARE PLAN
The patient's goals for the shift include      The clinical goals for the shift include Patients pain controlled throughout shift.    Over the shift, the patient did not make progress toward the following goals. Barriers to progression include . Recommendations to address these barriers include   Problem: Fall/Injury  Goal: Not fall by end of shift  Outcome: Progressing  Goal: Be free from injury by end of the shift  Outcome: Progressing     Problem: Pain  Goal: My pain/discomfort is manageable  Outcome: Progressing     Problem: Safety  Goal: Patient will be injury free during hospitalization  Outcome: Progressing  Goal: I will remain free of falls  Outcome: Progressing     Problem: Daily Care  Goal: Daily care needs are met  Outcome: Progressing     Problem: Psychosocial Needs  Goal: Demonstrates ability to cope with hospitalization/illness  Outcome: Progressing  Goal: Collaborate with me, my family, and caregiver to identify my specific goals  Outcome: Progressing     Problem: Discharge Barriers  Goal: My discharge needs are met  Outcome: Progressing   .

## 2023-11-30 NOTE — PROGRESS NOTES
Physical Therapy    Physical Therapy Treatment    Patient Name: Kiara Leyva  MRN: [id number]  Today's Date: 11/30/2023  Time Calculation  Start Time: 1515  Stop Time: 1538  Time Calculation (min): 23 min       Assessment/Plan   PT Assessment  PT Assessment Results: Decreased strength, Decreased range of motion, Decreased endurance, Decreased mobility, Pain  Rehab Prognosis: Good  Evaluation/Treatment Tolerance: Patient tolerated treatment well  Medical Staff Made Aware: Yes  Strengths: Premorbid level of function  End of Session Communication: Bedside nurse  Assessment Comment: Treatment tolerated well. Pt would benefit from low intensity PT to maximize functional mobiltiy, safety and independence.  End of Session Patient Position: Bed, 2 rail up, Alarm off, not on at start of session  PT Plan  Inpatient/Swing Bed or Outpatient: Inpatient  PT Plan  Treatment/Interventions: Bed mobility, Transfer training, Gait training, Stair training, Positioning, Therapeutic exercise, Therapeutic activity, Range of motion, Endurance training, Strengthening  PT Plan: Skilled PT  PT Frequency: BID  PT Discharge Recommendations: Low intensity level of continued care  Equipment Recommended upon Discharge: Wheeled walker  PT Recommended Transfer Status: Contact guard  PT - OK to Discharge: Yes      General Visit Information:   PT  Visit  PT Received On: 11/30/23  Response to Previous Treatment: Patient with no complaints from previous session.  General  Reason for Referral: 63 year old female POD 1 Left THR  Referred By: Aimee (SPEEDY)  Past Medical History Relevant to Rehab: HLD, HTN, arthritis, asthma, eczema, carcinoid tumor  Family/Caregiver Present: No  Prior to Session Communication: Bedside nurse  Patient Position Received: Bed, 3 rail up, Alarm off, not on at start of session  Preferred Learning Style: written, visual, verbal, auditory  General Comment: Pt presenting to PT supine lying, no lines, dressed to go home. Pt  pleasant and agreeable to PT.    Subjective   Precautions:  Precautions  Medical Precautions: Fall precautions  Precautions Comment: Left THR and falls precautions  Vital Signs:       Objective   Pain:  Pain Assessment  Pain Assessment: 0-10  Pain Score: 4  Pain Type: Acute pain  Pain Location: Hip  Pain Orientation: Left  Clinical Progression: Gradually improving  Pain Interventions: Cold applied  Cognition:  Cognition  Overall Cognitive Status: Within Functional Limits  Attention: Within Functional Limits  Processing Speed: Within funtional limits  Postural Control:     Extremity/Trunk Assessments:  RLE   RLE : Within Functional Limits  LLE   LLE : Exceptions to WFL (Rom and strength impaired at this time due to post op pain.)  Activity Tolerance:  Activity Tolerance  Endurance: Tolerates less than 10 min exercise, no significant change in vital signs  Treatments:  Therapeutic Exercise  Therapeutic Exercise Performed: Yes  Therapeutic Exercise Activity 1: foot and ankle pumping x20 bilat  Therapeutic Exercise Activity 2: quad sets on left x10  Therapeutic Exercise Activity 3: glut sets bilat x10  Therapeutic Exercise Activity 4: heel slides in supine x10on left  Therapeutic Exercise Activity 5: SAQ on left x10  Therapeutic Exercise Activity 6: hip abduction on left in supine x10  Therapeutic Exercise Activity 7: LAQ on left x10                   Bed Mobility  Bed Mobility: Yes  Bed Mobility 1  Bed Mobility 1: Supine to sitting  Level of Assistance 1: Minimum assistance  Bed Mobility 2  Bed Mobility  2: Sitting to supine  Level of Assistance 2: Contact guard    Ambulation/Gait Training  Ambulation/Gait Training Performed: Yes  Ambulation/Gait Training 1  Surface 1: Level tile  Device 1: Rolling walker  Assistance 1: Contact guard  Comments/Distance (ft) 1: Antalgic gait pattern with decreased stance on LLE. No LOB noted and tolerated well. Pt ambulated 20 feet x2 with FWW and CG.  Transfers  Transfer: Yes  Transfer  1  Transfer From 1: Bed to  Transfer to 1: Toilet  Technique 1: Sit to stand, Stand to sit  Transfer Device 1: Walker  Transfer Level of Assistance 1: Contact guard  Trials/Comments 1: Cueing for hand placement  Transfers 2  Transfer From 2: Toilet to  Transfer to 2: Bed  Technique 2: Sit to stand, Stand to sit  Transfer Device 2: Walker  Transfer Level of Assistance 2: Contact guard  Trials/Comments 2:  (Cueing for hand placement)         Outcome Measures:  [facility] Basic Mobility  Turning from your back to your side while in a flat bed without using bedrails: A little  Moving from lying on your back to sitting on the side of a flat bed without using bedrails: A little  Moving to and from bed to chair (including a wheelchair): A little  Standing up from a chair using your arms (e.g. wheelchair or bedside chair): A little  To walk in hospital room: A little  Climbing 3-5 steps with railing: A little  Basic Mobility - Total Score: 18    Education Documentation  Handouts, taught by Poornima Britton PT at 11/30/2023  4:29 PM.  Learner: Patient  Readiness: Acceptance  Method: Explanation  Response: Verbalizes Understanding, Demonstrated Understanding    Precautions, taught by Poornima Britton PT at 11/30/2023  4:29 PM.  Learner: Patient  Readiness: Acceptance  Method: Explanation  Response: Verbalizes Understanding, Demonstrated Understanding    Body Mechanics, taught by Poornima Britton PT at 11/30/2023  4:29 PM.  Learner: Patient  Readiness: Acceptance  Method: Explanation  Response: Verbalizes Understanding, Demonstrated Understanding    Home Exercise Program, taught by Poornima Britton PT at 11/30/2023  4:29 PM.  Learner: Patient  Readiness: Acceptance  Method: Explanation  Response: Verbalizes Understanding, Demonstrated Understanding    Mobility Training, taught by Poornima Britton PT at 11/30/2023  4:29 PM.  Learner: Patient  Readiness: Acceptance  Method:  Explanation  Response: Verbalizes Understanding, Demonstrated Understanding    Education Comments  No comments found.        OP EDUCATION:       Encounter Problems       Encounter Problems (Active)       Mobility       STG - Patient will ambulate 300 feet with FWW independently (Progressing)       Start:  11/30/23    Expected End:  12/07/23            STG - Patient will ascend and descend four to six stairs while holding 2 rails independently (Progressing)       Start:  11/30/23    Expected End:  12/07/23               Transfers       STG - Transfer from bed to chair independently with FWW (Progressing)       Start:  11/30/23    Expected End:  12/07/23            STG - Patient to transfer to and from sit to supine independently (Progressing)       Start:  11/30/23    Expected End:  12/07/23            STG - Patient will perform bed mobility independently (Progressing)       Start:  11/30/23    Expected End:  12/07/23            STG - Patient will follow hip precautions during transfers independently (Progressing)       Start:  11/30/23    Expected End:  12/07/23

## 2023-11-30 NOTE — OP NOTE
Left Hip Total Replacement, Left knee cortisone injection (L) Operative Note     Date: 2023  OR Location: [facility] A OR    Name: Kiara Leyva, : 1960, Age: 63 y.o., MRN: [id number], Sex: female    Diagnosis  Pre-op Diagnosis     * Primary osteoarthritis of left hip [M16.12] Post-op Diagnosis     * Primary osteoarthritis of left hip [M16.12]     Procedures  Left Hip Total Replacement, Left knee cortisone injection  70588 - VT ARTHRP ACETBLR/PROX FEM PROSTC AGRFT/ALGRFT      Surgeons      * Guru Rod - Primary    Resident/Fellow/Other Assistant:  Surgeon(s) and Role:     * Nicholas Vera MD - Resident - Assisting    Procedure Summary  Anesthesia: Consult  ASA: ASA status not filed in the log.  Anesthesia Staff: Anesthesiologist: Alejandro Aleman MD; Bhavesh Dodd MD  C-AA: PILLO Ventura; PILLO Gifford  Estimated Blood Loss: 150mL  Intra-op Medications:   Medication Name Total Dose   sodium chloride 0.9 % irrigation solution 3,000 mL   triamcinolone acetonide (Kenalog-40) injection 40 mg   lidocaine (Xylocaine) 10 mg/mL (1 %) injection 4 mL              Anesthesia Record               Intraprocedure I/O Totals          Intake    LR 1000.00 mL    NaCl 0.9 % 700.00 mL    Propofol Drip 0.00 mL    The total shown is the total volume documented since Anesthesia Start was filed.    Phenylephrine Drip 0.00 mL    The total shown is the total volume documented since Anesthesia Start was filed.    Total Intake 1700 mL       Output    Est. Blood Loss 100 mL    Total Output 100 mL       Net    Net Volume 1600 mL          Specimen: No specimens collected     Staff:   Circulator: Deb Chadwick RN  Relief Scrub: Denia Hawk  Scrub Person: Sarina Toth         Drains and/or Catheters: * None in log *    Tourniquet Times:         Implants:  Implants       Type Name Action Serial No.      Joint Hip ACETABULAR CUP, SECTOR, GRIPTON, SIZE 52MM - [facility] - [id number] Implanted NA      Screw SCREW CANCELLOUS 6.5 X 25 - SNA - [id number] Implanted NA     Joint Hip LINER, ALTRX, +4, 10 DEGREE, 36 X 52MM - SNA - [id number] Implanted NA     Joint STEM, BONE PRESERVATION, TRI-LOCK, SZ 6, STD OFFSET - SNA - [id number] Implanted NA     Joint HIP BALL, ARTIC/CHRISTA ZIRC 36-2 - SNA - [id number] Implanted NA              Findings: djd    Indications: Kiara Leyva is an 63 y.o. female who is having surgery for LEFT HIP ARTHRITIS M16.12.     The patient was seen in the preoperative area. The risks, benefits, complications, treatment options, non-operative alternatives, expected recovery and outcomes were discussed with the patient. The possibilities of reaction to medication, pulmonary aspiration, injury to surrounding structures, bleeding, recurrent infection, the need for additional procedures, failure to diagnose a condition, and creating a complication requiring transfusion or operation were discussed with the patient. The patient concurred with the proposed plan, giving informed consent.  The site of surgery was properly noted/marked if necessary per policy. The patient has been actively warmed in preoperative area. Preoperative antibiotics have been ordered and given within 1 hours of incision. Venous thrombosis prophylaxis unilateral compression    Procedure Details: Preoperative huddle was performed patient indication procedure and site verification.  Patient placed under spinal anesthetic.  She was given prophylactic antibiotics and trans exam casted.  The left knee was locally injected after sterile prep with 40 mg of Depo-Medrol and lidocaine  Next patient was positioned in the in the lateral cubitus position left hip and leg were sterilely prepped and draped usual fashion standard posterior approach was made to the hip short rotators and capsule were taken down 1 layer and teed proximally the hip was dislocated.  Femoral neck cut was made using a neck cutting guide.  Deep acetabular tractors were placed.   Labrum and pulmonary were excised.  The stem was then reamed to 51 mm a 52 mm DePuy [place] sector GRIPTION cup was placed had good interference fit 1 screw was used for additional fixation.  10 degree extender line was placed in the 2 o'clock position.  Next attention was paid to the femur.  The femur was prepared in the standard fashion ultimately a size 6 DePuy Tri-Lock stem was placed standard offset with a minus back 36 mm head this was a stable construct and leg lengths appeared equal.  Intraoperative x-ray was utilized.  The wound was dry prior to closure.  It was irrigated and closed in layers.  Short rotators and capsule reattached to the trochanter through bone holes.  Remaining wound was closed in layers skin was closed in a running subicular suture.  Complications:  None; patient tolerated the procedure well.    Disposition: PACU - hemodynamically stable.  Condition: stable         Additional Details:     Attending Attestation:     Guru Rod  Phone Number: [phone]

## 2023-11-30 NOTE — NURSING NOTE

## 2023-11-30 NOTE — PROGRESS NOTES
Physical Therapy    Physical Therapy Evaluation & Treatment    Patient Name: Kiara Leyva  MRN: [id number]  Today's Date: 11/30/2023   Time Calculation  Start Time: 0917  Stop Time: 0951  Time Calculation (min): 34 min    Assessment/Plan       IP OR SWING BED PT PLAN  Inpatient or Swing Bed: Inpatient  PT Plan  Treatment/Interventions: Bed mobility, Transfer training, Gait training, Balance training, Stair training, Endurance training, Range of motion, Therapeutic exercise, Therapeutic activity, Positioning, Home exercise program, Strengthening  PT Plan: Skilled PT  PT Frequency: Daily  PT Discharge Recommendations: Low intensity level of continued care  Equipment Recommended upon Discharge: Wheeled walker  PT Recommended Transfer Status: Assist x1  PT - OK to Discharge: Yes      Subjective     General Visit Information:  General  Reason for Referral: 63 year old female POD 1 Left THR  Referred By: Aimee (RN)  Past Medical History Relevant to Rehab: HLD, HTN, arthritis, asthma, eczema, carcinoid tumor  Family/Caregiver Present: No  Prior to Session Communication: Bedside nurse  Patient Position Received: Bed, 2 rail up, Alarm off, not on at start of session  Preferred Learning Style: written, visual, verbal, auditory  General Comment: Pt presenting to PT sitting at EOB with OT in room. Pt agreeable to PT. Some distress from pain in left hip.  Home Living:  Home Living  Type of Home: Apartment  Lives With: Alone  Home Adaptive Equipment: Walker rolling or standard  Home Layout: One level  Home Access: Elevator  Prior Level of Function:  Prior Function Per Pt/Caregiver Report  Level of [place]: Independent with ADLs and functional transfers  Receives Help From: Family  ADL Assistance: Independent  Homemaking Assistance: Independent  Ambulatory Assistance: Independent  Precautions:  Precautions  Medical Precautions: Fall precautions  Precautions Comment: Left THR and falls precautions  Vital Signs:        Objective   Pain:  Pain Assessment  Pain Assessment: 0-10  Pain Score: 5 - Moderate pain  Pain Type: Acute pain, Surgical pain  Pain Location: Hip  Pain Orientation: Left  Pain Interventions: Cold applied  Cognition:  Cognition  Overall Cognitive Status: Within Functional Limits  Attention: Within Functional Limits  Processing Speed: Within funtional limits    General Assessments:  General Observation  General Observation: No abnormalities noted. Pt in some distress due to post op pain.             Activity Tolerance  Endurance: Decreased tolerance for upright activites         Strength  Strength Comments: RLE WFLs, Left LE ROM and strength limited due to post op pain.           Coordination  Movements are Fluid and Coordinated: Yes         Static Sitting Balance  Static Sitting-Balance Support: Feet supported  Static Sitting-Level of Assistance: Close supervision       Functional Assessments:       Bed Mobility  Bed Mobility: Yes  Bed Mobility 1  Bed Mobility 1: Supine to sitting  Level of Assistance 1: Minimum assistance  Bed Mobility 2  Bed Mobility  2: Sitting to supine  Level of Assistance 2: Minimum assistance    Transfers  Transfer: Yes  Transfer 1  Technique 1: Sit to stand  Transfer Device 1: Walker  Transfer Level of Assistance 1: Minimum assistance  Trials/Comments 1: Cueing for hand placement  Transfers 2  Technique 2: Stand to sit  Transfer Device 2: Walker  Transfer Level of Assistance 2: Minimum assistance    Ambulation/Gait Training  Ambulation/Gait Training Performed: Yes  Ambulation/Gait Training 1  Surface 1: Level tile  Device 1: Rolling walker  Assistance 1: Minimum assistance  Comments/Distance (ft) 1: Antalgic gait pattern with decreased stance on LLE. Pt ambulated 40 feet in room. Slowed radha otherwise tolerated well.       Extremity/Trunk Assessments:  RLE   RLE : Within Functional Limits  LLE   LLE : Exceptions to WFL (grossly 3/5 as evidenced by function.)  Treatments:  Therapeutic  Exercise  Therapeutic Exercise Performed: Yes  Therapeutic Exercise Activity 1: foot and ankle pumping x20 bilat  Therapeutic Exercise Activity 2: quad sets on left x10  Therapeutic Exercise Activity 3: glut sets bilat x10  Therapeutic Exercise Activity 4: heel slides in supine x5 on left  Therapeutic Exercise Activity 5: SAQ on left x10  Therapeutic Exercise Activity 6: hip abduction on left in supine x5  Therapeutic Exercise Activity 7: LAQ on left x5                             Bed Mobility  Bed Mobility: Yes  Bed Mobility 1  Bed Mobility 1: Supine to sitting  Level of Assistance 1: Minimum assistance  Bed Mobility 2  Bed Mobility  2: Sitting to supine  Level of Assistance 2: Minimum assistance    Ambulation/Gait Training  Ambulation/Gait Training Performed: Yes  Ambulation/Gait Training 1  Surface 1: Level tile  Device 1: Rolling walker  Assistance 1: Minimum assistance  Comments/Distance (ft) 1: Antalgic gait pattern with decreased stance on LLE. Pt ambulated 40 feet in room. Slowed radha otherwise tolerated well.  Transfers  Transfer: Yes  Transfer 1  Technique 1: Sit to stand  Transfer Device 1: Walker  Transfer Level of Assistance 1: Minimum assistance  Trials/Comments 1: Cueing for hand placement  Transfers 2  Technique 2: Stand to sit  Transfer Device 2: Walker  Transfer Level of Assistance 2: Minimum assistance                   Outcome Measures:  [facility] Basic Mobility  Turning from your back to your side while in a flat bed without using bedrails: A little  Moving from lying on your back to sitting on the side of a flat bed without using bedrails: A little  Moving to and from bed to chair (including a wheelchair): A little  Standing up from a chair using your arms (e.g. wheelchair or bedside chair): A little  To walk in hospital room: A little  Climbing 3-5 steps with railing: A lot  Basic Mobility - Total Score: 17    Encounter Problems       Encounter Problems (Active)       Mobility       [facility] -  Patient will ambulate 300 feet with FWW independently       Start:  11/30/23    Expected End:  12/07/23            STG - Patient will ascend and descend four to six stairs while holding 2 rails independently       Start:  11/30/23    Expected End:  12/07/23               Transfers       STG - Transfer from bed to chair independently with FWW       Start:  11/30/23    Expected End:  12/07/23            STG - Patient to transfer to and from sit to supine independently       Start:  11/30/23    Expected End:  12/07/23            STG - Patient will perform bed mobility independently       Start:  11/30/23    Expected End:  12/07/23            STG - Patient will follow hip precautions during transfers independently       Start:  11/30/23    Expected End:  12/07/23                   Education Documentation  Handouts, taught by Poornima Britton PT at 11/30/2023 10:34 AM.  Learner: Patient  Readiness: Acceptance  Method: Explanation  Response: Verbalizes Understanding    Precautions, taught by Poornima Britton PT at 11/30/2023 10:34 AM.  Learner: Patient  Readiness: Acceptance  Method: Explanation  Response: Verbalizes Understanding    Body Mechanics, taught by Poornima Britton PT at 11/30/2023 10:34 AM.  Learner: Patient  Readiness: Acceptance  Method: Explanation  Response: Verbalizes Understanding    Home Exercise Program, taught by Poornima Britton PT at 11/30/2023 10:34 AM.  Learner: Patient  Readiness: Acceptance  Method: Explanation  Response: Verbalizes Understanding    Mobility Training, taught by Poornima Britton PT at 11/30/2023 10:34 AM.  Learner: Patient  Readiness: Acceptance  Method: Explanation  Response: Verbalizes Understanding    Education Comments  No comments found.

## 2023-11-30 NOTE — NURSING NOTE
Interdisciplinary team present: NP, PT, NM, CC, SW, Orthopedic Coordinator, and bedside RN.  Pain - controlled  Nausea - none  Discharge barrier -awaiting PT/OT evaluation  Discharge plan - Home with [facility]  Discharge date/time -11/30/23

## 2023-11-30 NOTE — PROGRESS NOTES
Occupational Therapy    Evaluation/Treatment    Patient Name: Kiara Leyva  MRN: [id number]  : 1960  Today's Date: 23  Time Calculation  Start Time: 825  Stop Time: 918  Time Calculation (min): 53 min       Assessment:  OT Assessment: Patient is pleasant and cooperative and motivated to regain prior level of independence. Patient has hip kit in place and should manage adequately at home. Low intensity therapy recommended to maximize functional independence.  Prognosis: Good  Medical Staff Made Aware: Yes  End of Session Communication: Bedside nurse  End of Session Patient Position: Bed, 1 rail up (Left with PT)  Prognosis: Good  Medical Staff Made Aware: Yes  Plan:  Treatment Interventions: ADL retraining, Functional transfer training, Endurance training, Patient/family training, Neuromuscular reeducation  OT Frequency: 3 times per week  OT Discharge Recommendations: Moderate intensity level of continued care  Equipment Recommended upon Discharge:  (Hip Kit, Raised Toilet Seat)  OT - OK to Discharge: Yes (Per POC)  Treatment Interventions: ADL retraining, Functional transfer training, Endurance training, Patient/family training, Neuromuscular reeducation    General:   OT Received On: 23  General  Reason for Referral: s/p L Total Hip Replacement  Past Medical History Relevant to Rehab: Acid Reflux, Osteoarthritis of Hip and Knee, CarcinoidTumor found on colonoscopy, htn, Eczema, OAB, R Hip Surgery   Prior to Session Communication: Bedside nurse  Patient Position Received:  (Bed with 2 rails up.)  General Comment: Patient is s/p L Total Hip Replacement, increased time required to complete all tasks.  Precautions:  Medical Precautions: Fall precautions (L Total Hip Replacement Precautions.WBAT L LE)  Pain:  Pain Assessment  Pain Assessment: 0-10  Pain Score: 4  Pain Location: Hip  Pain Orientation: Left    Objective   Cognition:  Overall Cognitive Status: Within Functional Limits   Home  Living:  Type of Home: Apartment (with Elevator, Bed/Bath on same Level, Raised Toilet Seat.)  Lives With: Alone  Prior Function:  Level of [place]: Independent with ADLs and functional transfers  Receives Help From: Family  ADL Assistance: Independent  Homemaking Assistance: Independent  Ambulatory Assistance: Independent (with cane)  IADL History:  Homemaking Responsibilities: Yes  Meal Prep Responsibility: Primary  Laundry Responsibility: Primary  Cleaning Responsibility: Primary  ADL:  Equipment:  (Hip Kit, raised toilet seat, shower seat, cane)  Eating Assistance: Independent  Grooming Assistance: Stand by  Bathing Assistance: Moderate (Assist to bathe below knees)  UE Dressing Assistance: Stand by  LE Dressing Assistance: Stand by  Toileting Assistance with Device: Stand by  Functional Assistance: Stand by  Activities of Daily Living: Grooming  Grooming Level of Assistance: Close supervision  Grooming Where Assessed: Standing sinkside  Grooming Comments: Patient able to brush teeth and wash face    UE Bathing  UE Bathing Level of Assistance: Setup  UE Bathing Where Assessed: Sitting sinkside    LE Bathing  LE Bathing Level of Assistance: Minimum assistance  LE Bathing Where Assessed: Standing sinkside  LE Bathing Comments: Assist to bathe below knees         LE Dressing  LE Dressing: Yes  LE Dressing Adaptive Equipment: Reacher, Sock aide, Dressing stick  Pants Level of Assistance: Close supervision  Sock Level of Assistance: Close supervision  LE Dressing Where Assessed: Edge of bed  LE Dressing Comments: Cues for use of LE adaptive equipment.    Toileting  Toileting Level of Assistance: Distant supervision  Where Assessed: Toilet  Activity Tolerance:  Endurance: Tolerates 10 - 20 min exercise with multiple rests  Activity Tolerance Comments: Fair activity tolerance  Functional Standing Tolerance:     Bed Mobility/Transfers: Bed Mobility  Bed Mobility: Yes  Bed Mobility 1  Bed Mobility 1: Supine to  sitting  Level of Assistance 1: Minimum assistance  Bed Mobility Comments 1: Assist with LEs off of bed.    Transfers  Transfer: Yes  Transfer 1  Transfer From 1: Bed to  Transfer to 1: Toilet  Technique 1: Sit to stand  Transfer Device 1: Walker  Transfer Level of Assistance 1: Minimum assistance  Trials/Comments 1: Cues for hand placement  Transfers 2  Transfer From 2: Toilet to  Transfer to 2: Bed  Technique 2: Sit to stand  Transfer Device 2: Walker  Transfer Level of Assistance 2: Minimum assistance  Trials/Comments 2: Cues for hand placement  Sitting Balance:  Static Sitting Balance  Static Sitting-Balance Support:  (Good Balance)  Dynamic Sitting Balance  Dynamic Sitting-Balance Support:  (Good Balance)  Standing Balance:  Static Standing Balance  Static Standing-Balance Support:  (Fair Balance)  Dynamic Standing Balance  Dynamic Standing-Balance Support:  (Fair Balance)  Strength:  Strength Comments: 5/5 Throughout  Hand Function:  Hand Function  Gross Grasp: Functional  Coordination: Functional    Outcome Measures: [facility] Daily Activity  Putting on and taking off regular lower body clothing: A little  Bathing (including washing, rinsing, drying): A little  Putting on and taking off regular upper body clothing: A little  Toileting, which includes using toilet, bedpan or urinal: A little  Taking care of personal grooming such as brushing teeth: None  Eating Meals: None  Daily Activity - Total Score: 20        Education Documentation  Precautions, taught by Mikey Valera OT at 11/30/2023 10:57 AM.  Learner: Patient  Readiness: Acceptance  Method: Explanation  Response: Verbalizes Understanding  Comment: Prerna educated regarding use of LE adaptive equipment and THR precautions, patient verbalized understanding.    ADL Training, taught by Mikey Valera OT at 11/30/2023 10:57 AM.  Learner: Patient  Readiness: Acceptance  Method: Explanation  Response: Verbalizes Understanding  Comment: Prerna  educated regarding use of LE adaptive equipment and THR precautions, patient verbalized understanding.    Education Comments  No comments found.        OP EDUCATION:       Goals:  Encounter Problems       Encounter Problems (Active)       ADLs       Patient will perform UB and LB bathing with independent level of assistance.        Start:  11/30/23    Expected End:  12/14/23            Patient with complete upper body dressing with independent level of assistance donning and doffing all UE clothes with no adaptive equipment while edge of bed        Start:  11/30/23    Expected End:  12/14/23            Patient with complete lower body dressing with independent level of assistance donning and doffing all LE clothes  with reacher, sock-aid, and dressing stick  while edge of bed        Start:  11/30/23    Expected End:  12/14/23            Patient will complete daily grooming tasks brushing teeth with independent level of assistance and PRN adaptive equipment while edge of bed .       Start:  11/30/23    Expected End:  12/14/23            Patient will complete toileting including hygiene clothing management/hygiene with independent level of assistance and raised toilet seat.       Start:  11/30/23    Expected End:  12/14/23               COGNITION/SAFETY       Patient will recall and adhere to hip precautions during all functional mobility/ADL tasks in order to demonstrate improved understanding and promote healing post op       Start:  11/30/23    Expected End:  12/14/23                     TRANSFERS       Patient will perform bed mobility independent level of assistance and bed rails in order to improve safety and independence with mobility       Start:  11/30/23    Expected End:  12/14/23            Patient will complete functional transfer to all surfaces with wheeled walker with independent level of assistance.       Start:  11/30/23    Expected End:  12/14/23

## 2023-12-01 ENCOUNTER — HOME CARE VISIT (OUTPATIENT)
Dept: HOME HEALTH SERVICES | Facility: HOME HEALTH | Age: 63
End: 2023-12-01
Payer: COMMERCIAL

## 2023-12-01 VITALS
SYSTOLIC BLOOD PRESSURE: 110 MMHG | TEMPERATURE: 97.6 F | DIASTOLIC BLOOD PRESSURE: 60 MMHG | OXYGEN SATURATION: 100 % | HEART RATE: 92 BPM

## 2023-12-01 PROCEDURE — G0151 HHCP-SERV OF PT,EA 15 MIN: HCPCS

## 2023-12-01 PROCEDURE — 0023 HH SOC

## 2023-12-01 ASSESSMENT — ENCOUNTER SYMPTOMS
PERSON REPORTING PAIN: PATIENT
PAIN LOCATION: LEFT HIP
HIGHEST PAIN SEVERITY IN PAST 24 HOURS: 7/10
PAIN: 1
LOWEST PAIN SEVERITY IN PAST 24 HOURS: 0/10

## 2023-12-01 ASSESSMENT — ACTIVITIES OF DAILY LIVING (ADL)
ENTERING_EXITING_HOME: NEEDS ASSISTANCE
OASIS_M1830: 03

## 2023-12-04 ENCOUNTER — HOME CARE VISIT (OUTPATIENT)
Dept: HOME HEALTH SERVICES | Facility: HOME HEALTH | Age: 63
End: 2023-12-04

## 2023-12-04 VITALS
SYSTOLIC BLOOD PRESSURE: 130 MMHG | DIASTOLIC BLOOD PRESSURE: 86 MMHG | OXYGEN SATURATION: 96 % | HEART RATE: 89 BPM | TEMPERATURE: 97.8 F

## 2023-12-04 PROCEDURE — G0152 HHCP-SERV OF OT,EA 15 MIN: HCPCS

## 2023-12-04 ASSESSMENT — ENCOUNTER SYMPTOMS
HIGHEST PAIN SEVERITY IN PAST 24 HOURS: 5/10
PAIN SEVERITY GOAL: 0/10
PAIN: 1
PAIN LOCATION - PAIN SEVERITY: 0/10
PERSON REPORTING PAIN: PATIENT
LOWEST PAIN SEVERITY IN PAST 24 HOURS: 0/10
SUBJECTIVE PAIN PROGRESSION: WAXING AND WANING
PAIN LOCATION: LEFT HIP

## 2023-12-06 ENCOUNTER — HOME CARE VISIT (OUTPATIENT)
Dept: HOME HEALTH SERVICES | Facility: HOME HEALTH | Age: 63
End: 2023-12-06
Payer: COMMERCIAL

## 2023-12-06 VITALS
HEART RATE: 96 BPM | SYSTOLIC BLOOD PRESSURE: 124 MMHG | RESPIRATION RATE: 18 BRPM | DIASTOLIC BLOOD PRESSURE: 82 MMHG | TEMPERATURE: 98.2 F

## 2023-12-06 PROCEDURE — G0157 HHC PT ASSISTANT EA 15: HCPCS

## 2023-12-06 ASSESSMENT — ENCOUNTER SYMPTOMS
PERSON REPORTING PAIN: PATIENT
LOWEST PAIN SEVERITY IN PAST 24 HOURS: 4/10
HIGHEST PAIN SEVERITY IN PAST 24 HOURS: 7/10
PAIN: 1

## 2023-12-08 ENCOUNTER — HOME CARE VISIT (OUTPATIENT)
Dept: HOME HEALTH SERVICES | Facility: HOME HEALTH | Age: 63
End: 2023-12-08
Payer: COMMERCIAL

## 2023-12-08 VITALS
TEMPERATURE: 99.4 F | RESPIRATION RATE: 18 BRPM | SYSTOLIC BLOOD PRESSURE: 118 MMHG | HEART RATE: 90 BPM | DIASTOLIC BLOOD PRESSURE: 74 MMHG

## 2023-12-08 DIAGNOSIS — G89.18 ACUTE POST-OPERATIVE PAIN: ICD-10-CM

## 2023-12-08 PROCEDURE — G0157 HHC PT ASSISTANT EA 15: HCPCS

## 2023-12-08 RX ORDER — OXYCODONE HYDROCHLORIDE 5 MG/1
5 TABLET ORAL EVERY 6 HOURS PRN
Qty: 28 TABLET | Refills: 0 | Status: CANCELLED | OUTPATIENT
Start: 2023-12-08 | End: 2023-12-15

## 2023-12-08 ASSESSMENT — ENCOUNTER SYMPTOMS
PAIN: 1
HIGHEST PAIN SEVERITY IN PAST 24 HOURS: 6/10
PERSON REPORTING PAIN: PATIENT
LOWEST PAIN SEVERITY IN PAST 24 HOURS: 4/10

## 2023-12-11 DIAGNOSIS — M25.552 LEFT HIP PAIN: Primary | ICD-10-CM

## 2023-12-11 RX ORDER — OXYCODONE AND ACETAMINOPHEN 5; 325 MG/1; MG/1
1 TABLET ORAL EVERY 4 HOURS PRN
Qty: 28 TABLET | Refills: 0 | Status: SHIPPED | OUTPATIENT
Start: 2023-12-11 | End: 2023-12-18

## 2023-12-12 ENCOUNTER — HOME CARE VISIT (OUTPATIENT)
Dept: HOME HEALTH SERVICES | Facility: HOME HEALTH | Age: 63
End: 2023-12-12
Payer: COMMERCIAL

## 2023-12-12 VITALS
SYSTOLIC BLOOD PRESSURE: 104 MMHG | DIASTOLIC BLOOD PRESSURE: 78 MMHG | HEART RATE: 90 BPM | TEMPERATURE: 98.1 F | RESPIRATION RATE: 18 BRPM

## 2023-12-12 PROCEDURE — G0157 HHC PT ASSISTANT EA 15: HCPCS

## 2023-12-12 ASSESSMENT — ENCOUNTER SYMPTOMS
HIGHEST PAIN SEVERITY IN PAST 24 HOURS: 4/10
PAIN: 1
LOWEST PAIN SEVERITY IN PAST 24 HOURS: 2/10

## 2023-12-14 ENCOUNTER — HOME CARE VISIT (OUTPATIENT)
Dept: HOME HEALTH SERVICES | Facility: HOME HEALTH | Age: 63
End: 2023-12-14
Payer: COMMERCIAL

## 2023-12-14 VITALS
RESPIRATION RATE: 18 BRPM | TEMPERATURE: 98.2 F | HEART RATE: 90 BPM | SYSTOLIC BLOOD PRESSURE: 124 MMHG | DIASTOLIC BLOOD PRESSURE: 82 MMHG

## 2023-12-14 PROCEDURE — G0157 HHC PT ASSISTANT EA 15: HCPCS

## 2023-12-14 ASSESSMENT — ENCOUNTER SYMPTOMS
HIGHEST PAIN SEVERITY IN PAST 24 HOURS: 3/10
LOWEST PAIN SEVERITY IN PAST 24 HOURS: 2/10

## 2023-12-15 DIAGNOSIS — M25.552 LEFT HIP PAIN: Primary | ICD-10-CM

## 2023-12-15 PROCEDURE — G0180 MD CERTIFICATION HHA PATIENT: HCPCS | Performed by: ORTHOPAEDIC SURGERY

## 2023-12-15 RX ORDER — OXYCODONE AND ACETAMINOPHEN 5; 325 MG/1; MG/1
1 TABLET ORAL EVERY 4 HOURS PRN
Qty: 28 TABLET | Refills: 0 | Status: SHIPPED | OUTPATIENT
Start: 2023-12-15 | End: 2023-12-22

## 2023-12-19 ENCOUNTER — HOME CARE VISIT (OUTPATIENT)
Dept: HOME HEALTH SERVICES | Facility: HOME HEALTH | Age: 63
End: 2023-12-19
Payer: COMMERCIAL

## 2023-12-19 VITALS — HEART RATE: 90 BPM | RESPIRATION RATE: 18 BRPM | DIASTOLIC BLOOD PRESSURE: 80 MMHG | SYSTOLIC BLOOD PRESSURE: 120 MMHG

## 2023-12-19 PROCEDURE — G0157 HHC PT ASSISTANT EA 15: HCPCS

## 2023-12-19 ASSESSMENT — ENCOUNTER SYMPTOMS
SUBJECTIVE PAIN PROGRESSION: RAPIDLY IMPROVING
PERSON REPORTING PAIN: PATIENT
HIGHEST PAIN SEVERITY IN PAST 24 HOURS: 5/10
PAIN: L HIP
LOWEST PAIN SEVERITY IN PAST 24 HOURS: 1/10
PAIN: 1

## 2023-12-21 ENCOUNTER — ANCILLARY PROCEDURE (OUTPATIENT)
Dept: RADIOLOGY | Facility: CLINIC | Age: 63
End: 2023-12-21
Payer: COMMERCIAL

## 2023-12-21 ENCOUNTER — OFFICE VISIT (OUTPATIENT)
Dept: ORTHOPEDIC SURGERY | Facility: CLINIC | Age: 63
End: 2023-12-21
Payer: COMMERCIAL

## 2023-12-21 VITALS — WEIGHT: 176 LBS | BODY MASS INDEX: 29.32 KG/M2 | HEIGHT: 65 IN

## 2023-12-21 DIAGNOSIS — M25.551 PAIN IN RIGHT HIP: ICD-10-CM

## 2023-12-21 DIAGNOSIS — M25.552 HIP PAIN, LEFT: ICD-10-CM

## 2023-12-21 DIAGNOSIS — M25.552 HIP PAIN, LEFT: Primary | ICD-10-CM

## 2023-12-21 DIAGNOSIS — M25.552 PAIN IN LEFT HIP: ICD-10-CM

## 2023-12-21 PROCEDURE — 1036F TOBACCO NON-USER: CPT | Performed by: ORTHOPAEDIC SURGERY

## 2023-12-21 PROCEDURE — 1159F MED LIST DOCD IN RCRD: CPT | Performed by: ORTHOPAEDIC SURGERY

## 2023-12-21 PROCEDURE — 73502 X-RAY EXAM HIP UNI 2-3 VIEWS: CPT | Mod: LEFT SIDE | Performed by: RADIOLOGY

## 2023-12-21 PROCEDURE — 99024 POSTOP FOLLOW-UP VISIT: CPT | Performed by: ORTHOPAEDIC SURGERY

## 2023-12-21 PROCEDURE — 1126F AMNT PAIN NOTED NONE PRSNT: CPT | Performed by: ORTHOPAEDIC SURGERY

## 2023-12-21 PROCEDURE — 73502 X-RAY EXAM HIP UNI 2-3 VIEWS: CPT | Mod: LT

## 2023-12-22 ENCOUNTER — HOME CARE VISIT (OUTPATIENT)
Dept: HOME HEALTH SERVICES | Facility: HOME HEALTH | Age: 63
End: 2023-12-22
Payer: COMMERCIAL

## 2023-12-22 VITALS — HEART RATE: 88 BPM | TEMPERATURE: 98.1 F | OXYGEN SATURATION: 99 %

## 2023-12-22 PROCEDURE — G0151 HHCP-SERV OF PT,EA 15 MIN: HCPCS

## 2023-12-22 RX ORDER — DICLOFENAC SODIUM 100 MG/1
100 TABLET, EXTENDED RELEASE ORAL DAILY
Qty: 90 TABLET | Refills: 1 | Status: SHIPPED | OUTPATIENT
Start: 2023-12-22 | End: 2024-04-29

## 2023-12-22 ASSESSMENT — ACTIVITIES OF DAILY LIVING (ADL)
HOME_HEALTH_OASIS: 00
OASIS_M1830: 00

## 2023-12-22 ASSESSMENT — ENCOUNTER SYMPTOMS
PAIN: 1
PAIN LOCATION: LEFT HIP
PAIN LOCATION - PAIN SEVERITY: 4/10
PERSON REPORTING PAIN: PATIENT

## 2023-12-22 NOTE — PROGRESS NOTES
Postop total hip replacement doing well incision looks fine has good mobility slight flexion contracture  X-rays show implant in good position assessment doing well reviewed precautions expectations Follow-up 2 months continue physical therapy

## 2024-01-02 ENCOUNTER — APPOINTMENT (OUTPATIENT)
Dept: PHYSICAL THERAPY | Facility: CLINIC | Age: 64
End: 2024-01-02
Payer: COMMERCIAL

## 2024-01-04 ENCOUNTER — EVALUATION (OUTPATIENT)
Dept: PHYSICAL THERAPY | Facility: CLINIC | Age: 64
End: 2024-01-04
Payer: COMMERCIAL

## 2024-01-04 ENCOUNTER — APPOINTMENT (OUTPATIENT)
Dept: PHYSICAL THERAPY | Facility: CLINIC | Age: 64
End: 2024-01-04
Payer: COMMERCIAL

## 2024-01-04 DIAGNOSIS — M25.652 STIFFNESS OF LEFT HIP JOINT: Primary | ICD-10-CM

## 2024-01-04 DIAGNOSIS — M25.552 ACUTE POSTOPERATIVE PAIN OF LEFT HIP: ICD-10-CM

## 2024-01-04 DIAGNOSIS — Z47.89 ORTHOPEDIC AFTERCARE: ICD-10-CM

## 2024-01-04 DIAGNOSIS — M16.12 PRIMARY OSTEOARTHRITIS OF LEFT HIP: ICD-10-CM

## 2024-01-04 DIAGNOSIS — G89.18 ACUTE POSTOPERATIVE PAIN OF LEFT HIP: ICD-10-CM

## 2024-01-04 PROCEDURE — 97535 SELF CARE MNGMENT TRAINING: CPT | Mod: GP | Performed by: PHYSICAL THERAPIST

## 2024-01-04 PROCEDURE — 97161 PT EVAL LOW COMPLEX 20 MIN: CPT | Mod: GP | Performed by: PHYSICAL THERAPIST

## 2024-01-04 ASSESSMENT — ENCOUNTER SYMPTOMS
OCCASIONAL FEELINGS OF UNSTEADINESS: 1
DEPRESSION: 0
LOSS OF SENSATION IN FEET: 0

## 2024-01-04 NOTE — PROGRESS NOTES
"  Physical Therapy  Physical Therapy Orthopedic Evaluation    Patient Name: Kiara Leyva  MRN: [id number]  Today's Date: 1/5/2024  Time Calculation  Start Time: 1545  Stop Time: 1630  Time Calculation (min): 45 min      Insurance:  Visit number: 1 of 20  Authorization info: No auth required   Insurance Type: [place]   Cert dates:1/4/2024 - 4/3/2024    General:  Reason for visit: L SUZI 11/29/2023  Referred by: Dr. Rod    Current Problem  1. Stiffness of left hip joint        2. Acute postoperative pain of left hip  Referral to Physical Therapy      3. Orthopedic aftercare        4. Primary osteoarthritis of left hip [M16.12]            Precautions: posterior hip precautions   Precautions  STEADI Fall Risk Score (The score of 4 or more indicates an increased risk of falling): 6    Medical History Form: Reviewed (scanned into chart)  R SUZI in 2020    Subjective:     Chief Complaint: Patient presents to clinic s/p SUZI on 11/29/2023. She underwent about 3 weeks of home health PT. She has been performing ankle pumps, LAQs, hip abduction, and walking for exercise.   Onset Date: 11/29/2023  MANE: surgery    Current Condition:   Better    Pain:   3/10  Location: lateral L hip over the incision   Description: ache or sharp  Aggravating Factors: Standing and Prolonged sitting  Relieving Factors:  Rest and Ice    Relevant Information (PMH & Previous Tests/Imaging): xrays   Previous Interventions/Treatments: Physical Therapy (home health)    Prior Level of Function (PLOF): 50%  Patient previously independent with all ADLs  Exercise/Physical Activity: PT home health PT  Work/School: Off of work until 2/20/2024    Patients Living Environment: Reviewed and no concern    Primary Language: English    Patient's Goal(s) for Therapy: \"walk with no pain and no assistive device\"    Red Flags: Do you have any of the following? No  Fever/chills, unexplained weight changes, dizziness/fainting, unexplained change in bowel or bladder " functions, unexplained malaise or muscle weakness, night pain/sweats, numbness or tingling    Objective:  Objective       ROM    Hip AROM (Degrees)      (R)  (L)  Flexion:      Extension:       Abduction:       Adduction:       ER:        IR:            Hip PROM (Degrees)      (R)  (L)  Flexion:        Extension:       Abduction:       Adduction:       ER:        IR:                Strength Testing    Core/Abdominals:   Hip      (R)  (L)  Flexion:        Extension:       Abduction:       Adduction:       ER:        IR:              Functional Screening  Squat:   Lunge:   SL Balance:   Lateral Step Down:   SL Quarter Squat:       Posture:       Palpation:       Joint Mobility:       Flexibility:       Observation:       Orthotics:       Gait: patient ambulates with a standard cane and furniture walks in her home      Outcome Measures:  Other Measures  Lower Extremity Funtional Score (LEFS): 18/80     EDUCATION: home exercise program, plan of care, activity modifications, pain management, and injury pathology       Goals: Set and discussed today  Active       PT Problem       PT Goal 1       Start:  01/05/24    Expected End:  04/03/24       In 4-6 weeks, patient will improve LEFS score by 9.  In 4-6 weeks, patient will be able to safely ambulate without an assistive device.   By discharge, patient will be independent with final HEP.         Goals will be set at next visit upon taking objective measures.    Plan of care was developed with input and agreement by the patient      Treatment Performed:  Provided education on signs and symptoms of a DVT or PE and stressed importance of further evaluation prior to beginning PT treatment.       Assessment: Patient presents s/p L SUZI on 11/29/2023, resulting in limited participation in pain-free ADLs and inability to perform at their prior level of function. Pt would benefit from physical therapy to address the impairments found & listed previously in the objective section in  order to return to safe and pain-free ADLs and prior level of function.    Patient complained of a R anterior thigh bruise (thinks she may have bumped into something), and also a new onset of R rib pain just below her R breast. Patient reports she was taking aspirin for about 1-2 weeks following surgery, however, has not taken any aspirin since. Due to a recent surgical procedure, I did reach out to the patient's referring physician, Dr. Rod, regarding concern for DVT or PE. Dr. Rod was not concerned about the bruise on the thigh, however, recommended that patient be evaluated  by her PCP or the ER for the rib pain. This information was relayed to the patient via phone after her appointment, however, patient elected to see how her symptoms felt over night, and stated she would call her PCPs office tomorrow morning, or go to the ER if she was unable to get an appointment with the PCP. I did stress the urgency of this matter to the patient and advised she go to the ER asap as her MD would like her to undergo further evaluation. Will resume PT and perform remaining objective measures upon clearance for DVT or PE.          Plan:   Patient must be cleared for a DVT or PE prior to continuing PT.       Planned Interventions include: therapeutic exercise, self-care home management, manual therapy, therapeutic activities, gait training, neuromuscular coordination, vasopneumatic, dry needling, aquatic therapy  Frequency: 2 x Week  Duration: 12 Weeks      Guerita Stafford PT

## 2024-01-05 PROBLEM — Z47.89 ORTHOPEDIC AFTERCARE: Status: ACTIVE | Noted: 2024-01-05

## 2024-01-05 PROBLEM — M25.652 STIFFNESS OF LEFT HIP JOINT: Status: ACTIVE | Noted: 2024-01-05

## 2024-01-09 ENCOUNTER — APPOINTMENT (OUTPATIENT)
Dept: PHYSICAL THERAPY | Facility: CLINIC | Age: 64
End: 2024-01-09
Payer: COMMERCIAL

## 2024-01-11 ENCOUNTER — TREATMENT (OUTPATIENT)
Dept: PHYSICAL THERAPY | Facility: CLINIC | Age: 64
End: 2024-01-11
Payer: COMMERCIAL

## 2024-01-11 DIAGNOSIS — M16.12 PRIMARY OSTEOARTHRITIS OF LEFT HIP: ICD-10-CM

## 2024-01-11 DIAGNOSIS — Z47.89 ORTHOPEDIC AFTERCARE: ICD-10-CM

## 2024-01-11 DIAGNOSIS — M25.652 STIFFNESS OF LEFT HIP JOINT: Primary | ICD-10-CM

## 2024-01-11 DIAGNOSIS — G89.18 ACUTE POSTOPERATIVE PAIN OF LEFT HIP: ICD-10-CM

## 2024-01-11 DIAGNOSIS — M25.552 ACUTE POSTOPERATIVE PAIN OF LEFT HIP: ICD-10-CM

## 2024-01-11 PROCEDURE — 97112 NEUROMUSCULAR REEDUCATION: CPT | Mod: GP,CQ

## 2024-01-11 PROCEDURE — 97110 THERAPEUTIC EXERCISES: CPT | Mod: GP,CQ

## 2024-01-11 ASSESSMENT — PAIN - FUNCTIONAL ASSESSMENT: PAIN_FUNCTIONAL_ASSESSMENT: 0-10

## 2024-01-11 ASSESSMENT — PAIN SCALES - GENERAL: PAINLEVEL_OUTOF10: 4

## 2024-01-11 NOTE — PROGRESS NOTES
Physical Therapy  Physical Therapy Treatment    Patient Name: Kiara Leyva  MRN: [id number]  Today's Date: 1/11/2024  Time Calculation  Start Time: 1405  Stop Time: 1455  Time Calculation (min): 50 min    Insurance:  Visit number: 2 of 20  Authorization info: No auth required  Insurance Type: [place]  Cert date start: 1/4/2024  Cert date end: 4/3/2024                General   Reason for visit: Lt. SUZI 11/29/2023   Referred by: CRISTIANA Rod MD    Current Problem  1. Stiffness of left hip joint        2. Orthopedic aftercare            Precautions  Post-Surgical Precautions: Left hip precautions    Pain Assessment: 0-10  Pain Score: 4    Subjective:   Patient reports that her hip discomfort is a 4 today.  HEP Performed:  Yes    Objective:   Patient arrived fwb with standard cane for balance and support.    Treatments:   T5 NuStep- 4'  DBE 2x1.5'  Bosu lunge alternating- 1.5'  Biodex balance LOS L12- 60%, 55%  KB 4kg tandem stand R/L, L/R cw/ccw- 1' ea dir.  Hamstring curl 20# 1x20  Leg press 50# 1x20  Hip ab/ad 25#/25# 2x15 ea.  SS R/L HF- 1' ea.  Ice- 10'      Charges: TE 2, NME 1 (CQ Modifier)    Assessment:   Did well.  No problems post.    Plan:   Continue decreasing left hip discomfort increasing rom, flexibility, mobility, balance, strength, endurance and function for return to normal ADL.    Hal Oswald, PTA

## 2024-01-15 ENCOUNTER — TREATMENT (OUTPATIENT)
Dept: PHYSICAL THERAPY | Facility: CLINIC | Age: 64
End: 2024-01-15
Payer: COMMERCIAL

## 2024-01-15 DIAGNOSIS — G89.18 ACUTE POSTOPERATIVE PAIN OF LEFT HIP: ICD-10-CM

## 2024-01-15 DIAGNOSIS — M25.652 STIFFNESS OF LEFT HIP JOINT: Primary | ICD-10-CM

## 2024-01-15 DIAGNOSIS — Z47.89 ORTHOPEDIC AFTERCARE: ICD-10-CM

## 2024-01-15 DIAGNOSIS — M25.552 ACUTE POSTOPERATIVE PAIN OF LEFT HIP: ICD-10-CM

## 2024-01-15 DIAGNOSIS — M16.12 PRIMARY OSTEOARTHRITIS OF LEFT HIP: ICD-10-CM

## 2024-01-15 PROCEDURE — 97110 THERAPEUTIC EXERCISES: CPT | Mod: GP | Performed by: PHYSICAL THERAPIST

## 2024-01-15 NOTE — PROGRESS NOTES
Physical Therapy  Physical Therapy Treatment    Patient Name: Kiara Leyva  MRN: [id number]  Today's Date: 1/15/2024  Time Calculation  Start Time: 1640  Stop Time: 1725  Time Calculation (min): 45 min    Insurance:  Visit number: 3 of 20  Authorization info: No auth required  Insurance Type: [place]  Cert date start: 1/4/2024  Cert date end: 4/3/2024                General   Reason for visit: Lt. SUZI 11/29/2023   Referred by: CRISTIANA Rod MD    Current Problem  1. Stiffness of left hip joint        2. Orthopedic aftercare        3. Acute postoperative pain of left hip        4. Primary osteoarthritis of left hip [M16.12]            Precautions:   posterior hip precautions    No hip flexion past 90 degrees   No hip internal rotation or adduction past neutral  Precautions  STEADI Fall Risk Score (The score of 4 or more indicates an increased risk of falling): 6    Subjective:   Patient reports that her hip discomfort is a 4 today.  HEP Performed:  Yes    Objective:   Patient arrived fwb with standard cane for balance and support.      ROM       Hip PROM (Degrees)                             (R)                    (L)  Flexion:             124                  90                                           Extension:                                                          Abduction:         25                  14                              Adduction:                                                         ER:                       16                   29                        IR:                       15                    15                                                             Strength Testing     Core/Abdominals:   Hip                             (R)                    (L)  Flexion:           4+/5  4/5                                                           Extension:         4+/5  4/5                                              Abduction:       4+/5  4-/5                                                   Adduction:        4+/5  4-/5                                                 ER:                                                                      IR:                                                                                                     Functional Screening  Squat:   Lunge:   SL Balance: R: 4 seconds, L: unable  Lateral Step Down:   SL Quarter Squat:         Posture:         Palpation:  muscle tenderness noted over ITB, glute med, and quad        Joint Mobility:         Flexibility: BL hamstring and quad flexibility limitations        Orthotics:  N/A        Gait: patient ambulates with a standard cane and furniture walks in her home    No red flag signs or symptoms or DVT.           Treatments:   - T5 NuStep- 5'  - DBE 2x2'  - Bosu lunge alternating- 1.5'  - alternating hip abd x 2'  - alternating HSC x 2'   - Seated hip adduction iso (maintaining post op precautions) x 20, 5 second hold  - Seated LAQ x 20   - Objective measures      Not performed this visit:  Biodex balance LOS L12- 60%, 55%  KB 4kg tandem stand R/L, L/R cw/ccw- 1' ea dir.  Hamstring curl 20# 1x20  Leg press 50# 1x20  Hip ab/ad 25#/25# 2x15 ea.  SS R/L HF- 1' ea.  Ice- 10'      Charges: TE 2, NME 1     Assessment:   Patient able to complete all exercises without an increase in symptoms. She did require verbal cueing to prevent lateral trunk lean with hip abd, otherwise required minimal cueing throughout the session.     Plan:   Continue decreasing left hip discomfort increasing rom, flexibility, mobility, balance, strength, endurance and function for return to normal ADL.    Guerita Stafford, PT

## 2024-01-16 ENCOUNTER — APPOINTMENT (OUTPATIENT)
Dept: PRIMARY CARE | Facility: CLINIC | Age: 64
End: 2024-01-16
Payer: COMMERCIAL

## 2024-01-18 ENCOUNTER — OFFICE VISIT (OUTPATIENT)
Dept: PRIMARY CARE | Facility: CLINIC | Age: 64
End: 2024-01-18
Payer: COMMERCIAL

## 2024-01-18 VITALS
DIASTOLIC BLOOD PRESSURE: 70 MMHG | HEART RATE: 98 BPM | HEIGHT: 65 IN | WEIGHT: 178.8 LBS | SYSTOLIC BLOOD PRESSURE: 114 MMHG | BODY MASS INDEX: 29.79 KG/M2

## 2024-01-18 DIAGNOSIS — J06.9 UPPER RESPIRATORY TRACT INFECTION, UNSPECIFIED TYPE: Primary | ICD-10-CM

## 2024-01-18 DIAGNOSIS — L29.9 ITCHING: ICD-10-CM

## 2024-01-18 PROCEDURE — 1126F AMNT PAIN NOTED NONE PRSNT: CPT | Performed by: FAMILY MEDICINE

## 2024-01-18 PROCEDURE — 99214 OFFICE O/P EST MOD 30 MIN: CPT | Performed by: FAMILY MEDICINE

## 2024-01-18 PROCEDURE — 3074F SYST BP LT 130 MM HG: CPT | Performed by: FAMILY MEDICINE

## 2024-01-18 PROCEDURE — 1159F MED LIST DOCD IN RCRD: CPT | Performed by: FAMILY MEDICINE

## 2024-01-18 PROCEDURE — 1036F TOBACCO NON-USER: CPT | Performed by: FAMILY MEDICINE

## 2024-01-18 PROCEDURE — 3078F DIAST BP <80 MM HG: CPT | Performed by: FAMILY MEDICINE

## 2024-01-18 RX ORDER — AMOXICILLIN 875 MG/1
875 TABLET, FILM COATED ORAL 2 TIMES DAILY
Qty: 20 TABLET | Refills: 0 | Status: SHIPPED | OUTPATIENT
Start: 2024-01-18 | End: 2024-01-28

## 2024-01-18 RX ORDER — TRIAMCINOLONE ACETONIDE 0.25 MG/G
15 OINTMENT TOPICAL
COMMUNITY
Start: 2016-07-07 | End: 2024-04-01 | Stop reason: SDUPTHER

## 2024-01-18 RX ORDER — HYDROXYZINE HYDROCHLORIDE 10 MG/1
TABLET, FILM COATED ORAL
COMMUNITY
Start: 2020-01-21 | End: 2024-01-18 | Stop reason: SDUPTHER

## 2024-01-18 RX ORDER — HYDROXYZINE HYDROCHLORIDE 10 MG/1
25 TABLET, FILM COATED ORAL EVERY 8 HOURS PRN
Qty: 90 TABLET | Refills: 0 | Status: SHIPPED | OUTPATIENT
Start: 2024-01-18

## 2024-01-18 RX ORDER — BISACODYL 10 MG/1
10 SUPPOSITORY RECTAL DAILY PRN
COMMUNITY
Start: 2023-11-29

## 2024-01-18 RX ORDER — CELECOXIB 100 MG/1
CAPSULE ORAL
COMMUNITY
Start: 2018-01-05

## 2024-01-18 RX ORDER — TOBRAMYCIN 3 MG/ML
SOLUTION/ DROPS OPHTHALMIC
COMMUNITY
Start: 2022-07-29

## 2024-01-18 RX ORDER — MONTELUKAST SODIUM 10 MG/1
10 TABLET ORAL DAILY PRN
Qty: 90 TABLET | Refills: 0 | Status: SHIPPED | OUTPATIENT
Start: 2024-01-18 | End: 2024-04-17

## 2024-01-18 RX ORDER — VIBEGRON 75 MG/1
TABLET, FILM COATED ORAL
COMMUNITY
Start: 2023-07-03

## 2024-01-18 RX ORDER — ASCORBIC ACID
CRYSTALS ORAL
COMMUNITY
Start: 2017-06-08

## 2024-01-18 RX ORDER — OMEPRAZOLE 40 MG/1
CAPSULE, DELAYED RELEASE ORAL
COMMUNITY
Start: 2017-05-02 | End: 2024-01-24 | Stop reason: SDUPTHER

## 2024-01-18 RX ORDER — TRAMADOL HYDROCHLORIDE 50 MG/1
TABLET ORAL
COMMUNITY
Start: 2016-07-07 | End: 2024-04-08 | Stop reason: WASHOUT

## 2024-01-18 RX ORDER — DUTASTERIDE 0.5 MG/1
CAPSULE, LIQUID FILLED ORAL
COMMUNITY
Start: 2023-01-11

## 2024-01-18 NOTE — PROGRESS NOTES
Fuv, concerns of sinus infection, suture irritation and med refill.         Chief Complaint:  No chief complaint on file.  History Of Present Illness:   Kiara Leyva is a 63 y.o. female presenting for an office visit.     Here for refills. And sick visit.  Refill hydroxyzine, and singulair for phlegm.       Sore throat and coughing up phlegm for a couple months.      PMH: acid reflux, osteoarthritis of the hip and of the knee, carcinoid tumor found on a colonoscopy that was resected and a normal colonoscopy afterwards in 2017, hypertension, eczema, overactive bladder. History of b/l hip replacements.      Left knee- swollen. more bothersome recently. is on diclofenac. March 2023 update: thinking about aquatic therapy. April 11, 2023 update: she went to therapy one time, but she is getting anterior sharp pain on her patella, xrays were done and were read as unremarkable. we discussed possibly related from referred left hip pain. June 23, 2023- agreed on left knee injection.      She also saw orthopedic surgery for her bilateral hips and was diagnosed with osteoarthritis.  - right hip replacement Dec 2020.  - she may have TKA in the future.   - Left hip total hip replacement Nov 2023.     Suture: left hip laterally- scarred over from Nov 2023 surgery. Doing ok.      HTN- hctz. well controlled today in the office.      GERD- omeprazole.      Overactive bladder: She did see urogynecology. Seen Jan 2023.      GI specialist- through [facility]- she has routine follow up.   Colonoscopy Nov 11, 2020- reportedly looks good and follow up in 5 years. July 2022 update: she states she saw GI about a year ago and is doing well.      Stress- she lost her mom June 13, 2020. she's an only child. she is sad about it. March 2023 update: carvalho, but sleeping ok.      Past surgical history: Carcinoid tumor resection, tubal ligation. B/l hip replacement.      Allergies: No known drug allergies.        Social history: Quit cigarette  "smoking in 2012, 2-3 drinks of alcohol week. Denies illicit drug use. She works at a [employer] center.      Declines flu shot.      Mammogram: June 2022: benign     COVID: vaccinated, no booster.     Healthcare team:  - Carlos- urogyn  - Marcel- ortho hip    ALL: NKDA     Flu shot:  - declines     Immunizations:   - utd per pt.     Colonoscopy: next due 2025.      GYN: has a GYN.                       Review of Systems:     Negative  Constitutional:   - fever   - chills   - night sweats  - unexpected weight change  - changes in sleep     Eyes:   - loss of vision  - double vision  - floaters       Cardiovascular:   - chest pain  - chest heaviness  - palpitations  - swelling in ankles     Musculoskeletal:   - bone pain  - muscle pain  - joint pain     Respiratory:   - shortness of breath  - difficulty breathing  - wheezing     Neurological:   - loss of consciousness  - tremors  - dizzy spells  - numbness   - tingling     Gastrointestinal:   - abdominal pain  - nausea  - vomiting  - constipation  - diarrhea  - bloody stools  - loss of bowel control  - indigestion  - heartburn  - sour taste in throat when waking up     Genitourinary:   - urinary incontinence  - increased urinary frequency  - painful urination  - blood in urine     Skin:   - Rash  - lumps or bumps  - worrisome moles     Endocrine:   - excessive thirst  - feeling too hot  - feeling too cold  - feeling tired  - fatigue      Hematologic/Lymphatic:   - swollen glands  - blood clotting problems  - easy bruising.     Psychological:   - feelings of depression  - feelings of anxiety.          Positive  Psychological:   - feeling generally happy  - feeling safe at home            Last Recorded Vitals:  Vitals:    01/18/24 1330   BP: 114/70   Pulse: 98   Weight: 81.1 kg (178 lb 12.8 oz)   Height: 1.651 m (5' 5\")        Past Medical History:  She has a past medical history of Arthritis, Asthma, Carcinoid tumor, Eczema, GERD (gastroesophageal reflux disease), " Hyperlipidemia, Hypertension, OAB (overactive bladder), and Vitamin D deficiency.     Past Surgical History:  She has a past surgical history that includes Tubal ligation (07/07/2016); Other surgical history; Colonoscopy; Total hip arthroplasty (Right); and Hip Arthroplasty (Left, 11/29/2023).     Social History:  She reports that she quit smoking about 12 years ago. Her smoking use included cigarettes. She has a 25.00 pack-year smoking history. She has never used smokeless tobacco. She reports current alcohol use of about 1.0 standard drink of alcohol per week. She reports that she does not use drugs.     Family History:  Family History   Problem Relation Name Age of Onset    Coronary artery disease Mother          CABG    Other (homicicde) Father      Hypertension Father      Breast cancer Mother's Sister      Colon cancer Mother's Brother      Ovarian cysts Maternal Cousin       Allergies:  Patient has no known allergies.     Outpatient Medications:  Current Outpatient Medications   Medication Instructions    albuterol 90 mcg/actuation inhaler 1-2 puffs, inhalation, EVERY 4 TO 6 HOURS AS NEEDED<BR>    amitriptyline (ELAVIL) 10 mg, oral, Nightly    amoxicillin (Amoxil) 500 mg capsule TAKE 4 CAPSULES BY MOUTH 1 HOUR BEFORE DENTAL APPOINTMENT<BR>    azelastine (Optivar) 0.05 % ophthalmic solution 1 drop, ophthalmic (eye), 2 times daily PRN, affected eye    biotin 5,000 mcg tablet,disintegrating 1 tablet, oral, Daily    chlorhexidine (Peridex) 0.12 % solution SWISH AND SPIT 15ML FOR 30 SECONDS NIGHT PRIOR TO SURGERY AND MORNING OF SURGERY    cholecalciferol (Vitamin D-3) 50 mcg (2,000 unit) capsule 1 capsule, oral, Daily    clobetasol (Temovate) 0.05 % cream APPLY sparingly to affected area Twice daily for 2 weeks, then once daily as needed.<BR>    cyanocobalamin (Vitamin B-12) 1,000 mcg tablet 1 tablet, oral, Daily    cyclobenzaprine (Flexeril) 10 mg tablet 1 tablet, oral, Nightly PRN    diclofenac sodium (VOLTAREN  XR) 100 mg, oral, Daily    diclofenac sodium (VOLTAREN) 4 g, Topical, 2 times daily PRN, APPLY SPARINGLY TO AFFECTED AREA EVERY DAY    ergocalciferol (VITAMIN D-2) 1,250 mcg, oral, Weekly    fluticasone (Flonase) 50 mcg/actuation nasal spray 1 spray, Each Nostril, Daily PRN    hydroCHLOROthiazide (HYDRODiuril) 25 mg tablet 1 tablet, oral, Daily    ketorolac (TORADOL) 10 mg, oral, Every 6 hours PRN    minoxidil (Loniten) 2.5 mg tablet 0.5 tablets, oral, Daily    montelukast (Singulair) 10 mg tablet 1 tablet, oral, Daily PRN, As directed    Myrbetriq 50 mg, oral, Daily    oxybutynin XL (DITROPAN-XL) 15 mg, oral, 2 times daily    pantoprazole (PROTONIX) 40 mg, oral, Daily before breakfast, Do not crush, chew, or split.    simvastatin (Zocor) 20 mg tablet         Physical Exam:  GENERAL: Well developed, well nourished, alert and cooperative, and appears to be in no acute distress.     PSYCH: mood pleasant and appropriate     HEAD: normocephalic     EYES: PERRL, EOMI. vision is grossly intact.        NOSE:  Nares patent b/l.   No bleeding nasal polyps. No nasal discharge.     THROAT:    Oropharynx clear.  No inflammation, swelling, exudate, or lesions.        NECK: Neck supple, non-tender.   No masses, no thyromegaly.  No anterior cervical lymphadenopathy.     CARDIAC:   RRR.     LUNGS: Good respiratory effort.  Clear to auscultation b/l without rales, rhonchi, wheezing.     ABD: soft, nontender       GAIT: Normal             SKIN: Skin normal color  no lesions or eruptions.            Last Labs:  CBC -  Lab Results   Component Value Date    WBC 7.0 11/30/2023    HGB 10.3 (L) 11/30/2023    HCT 29.7 (L) 11/30/2023    MCV 83 11/30/2023     11/30/2023        CMP -  Lab Results   Component Value Date    CALCIUM 7.9 (L) 11/30/2023    PROT 6.9 04/07/2023    ALBUMIN 4.2 04/07/2023    AST 17 04/07/2023    ALT 13 04/07/2023    ALKPHOS 73 04/07/2023    BILITOT 0.7 04/07/2023        LIPID PANEL -  Lab Results   Component  Value Date    CHOL 225 (H) 04/07/2023    TRIG 127 04/07/2023    HDL 65.1 04/07/2023    CHHDL 3.5 04/07/2023    LDLF 135 (H) 04/07/2023    VLDL 25 04/07/2023           Lab Results   Component Value Date    HGBA1C 5.5 04/07/2023          XR hip left with pelvis when performed 2 or 3 views  Narrative: Interpreted By:  Leo Lozada,   STUDY:  XR HIP LEFT WITH PELVIS WHEN PERFORMED 2 OR 3 VIEWS      INDICATION:  Signs/Symptoms:pain.      COMPARISON:  November 29      ACCESSION NUMBER(S):  [id number]      ORDERING CLINICIAN:  JOAQUIN ROYAL      FINDINGS:  Left total hip arthroplasty without fracture, malalignment, or  periprosthetic lucency.      Impression: Satisfactory appearance interval left total hip arthroplasty.      Signed by: Leo Lozada 12/22/2023 7:02 PM  Dictation workstation:   [id number]         Assessment/Plan   Problem List Items Addressed This Visit             ICD-10-CM    Itching L29.9    Upper respiratory tract infection - Primary J06.9        Please let the office know at least one week in advance if you need an immunization or booster shot.     Make sure that you are established with a GYN provider for routine OB/GYN care.     Continue with your healthcare team- re-establish with urogyn.     Follow up here in 6 months.     Antibiotic for sinus congestion for several months.     Other refills done, let the office know when you need any other refills.       Regarding referrals, you can call the following phone number to attempt to schedule: [phone].  Another potential phone number is: [phone]®[facility] ([phone]).  The number for pediatric referrals is: [phone].           Nicholas Barr,

## 2024-01-24 ENCOUNTER — OFFICE VISIT (OUTPATIENT)
Dept: PRIMARY CARE | Facility: CLINIC | Age: 64
End: 2024-01-24
Payer: COMMERCIAL

## 2024-01-24 ENCOUNTER — TREATMENT (OUTPATIENT)
Dept: PHYSICAL THERAPY | Facility: CLINIC | Age: 64
End: 2024-01-24
Payer: COMMERCIAL

## 2024-01-24 VITALS
BODY MASS INDEX: 29.66 KG/M2 | WEIGHT: 178 LBS | HEIGHT: 65 IN | DIASTOLIC BLOOD PRESSURE: 81 MMHG | SYSTOLIC BLOOD PRESSURE: 126 MMHG | HEART RATE: 94 BPM

## 2024-01-24 DIAGNOSIS — M16.12 PRIMARY OSTEOARTHRITIS OF LEFT HIP: ICD-10-CM

## 2024-01-24 DIAGNOSIS — Z47.89 ORTHOPEDIC AFTERCARE: ICD-10-CM

## 2024-01-24 DIAGNOSIS — M25.552 ACUTE POSTOPERATIVE PAIN OF LEFT HIP: ICD-10-CM

## 2024-01-24 DIAGNOSIS — M25.652 STIFFNESS OF LEFT HIP JOINT: Primary | ICD-10-CM

## 2024-01-24 DIAGNOSIS — K21.9 GASTROESOPHAGEAL REFLUX DISEASE WITHOUT ESOPHAGITIS: Primary | ICD-10-CM

## 2024-01-24 DIAGNOSIS — G89.18 ACUTE POSTOPERATIVE PAIN OF LEFT HIP: ICD-10-CM

## 2024-01-24 PROCEDURE — 1159F MED LIST DOCD IN RCRD: CPT | Performed by: FAMILY MEDICINE

## 2024-01-24 PROCEDURE — 97110 THERAPEUTIC EXERCISES: CPT | Mod: GP,CQ

## 2024-01-24 PROCEDURE — 97112 NEUROMUSCULAR REEDUCATION: CPT | Mod: GP,CQ

## 2024-01-24 PROCEDURE — 1126F AMNT PAIN NOTED NONE PRSNT: CPT | Performed by: FAMILY MEDICINE

## 2024-01-24 PROCEDURE — 3079F DIAST BP 80-89 MM HG: CPT | Performed by: FAMILY MEDICINE

## 2024-01-24 PROCEDURE — 3074F SYST BP LT 130 MM HG: CPT | Performed by: FAMILY MEDICINE

## 2024-01-24 PROCEDURE — 1036F TOBACCO NON-USER: CPT | Performed by: FAMILY MEDICINE

## 2024-01-24 PROCEDURE — 99214 OFFICE O/P EST MOD 30 MIN: CPT | Performed by: FAMILY MEDICINE

## 2024-01-24 RX ORDER — OMEPRAZOLE 40 MG/1
40 CAPSULE, DELAYED RELEASE ORAL
Qty: 90 CAPSULE | Refills: 1 | Status: SHIPPED | OUTPATIENT
Start: 2024-01-24

## 2024-01-24 ASSESSMENT — PAIN SCALES - GENERAL: PAINLEVEL_OUTOF10: 4

## 2024-01-24 ASSESSMENT — PAIN - FUNCTIONAL ASSESSMENT: PAIN_FUNCTIONAL_ASSESSMENT: 0-10

## 2024-01-24 NOTE — PROGRESS NOTES
Physical Therapy  Physical Therapy Treatment    Patient Name: Kiara Leyva  MRN: [id number]  Today's Date: 1/24/2024  Time Calculation  Start Time: 1005  Stop Time: 1100  Time Calculation (min): 55 min    Insurance:  Visit number: 4 of 20  Authorization info: No auth required  Insurance Type: [place]  Cert date start: 1/4/2024  Cert date end: 4/3/2024                General   Reason for visit: Lt. SUZI 11/29/2023   Referred by: CRISTIANA Rod MD    Current Problem  1. Stiffness of left hip joint        2. Orthopedic aftercare            Precautions  Post-Surgical Precautions: Left hip precautions    Pain Assessment: 0-10  Pain Score: 4    Subjective:   Patient reports that her hip discomfort is a 4 today.  HEP Performed:  Yes    Objective:   Patient ambulating fwb with standard cane for balance and support.    Treatments:   Bike- 4'  DBE 2x1.5'  Bosu lunge alternating- 1.5'  Biodex balance LOS L11- 23%, 52%  KB 4kg tandem stand R/L, L/R cw/ccw- 1' ea dir.  Hamstring curl 20# 1x20  Leg press 60# 1x20  Hip ab/ad 25#/25# 2x17 ea.  Multihip ext 22# R/L x 15 ea.  SS R/L Q/HF- 1' ea.  Ice- 10'    Charges: TE 2, NME 1 (CQ Modifier) ([place] [employer]- No modalities or vaso)    Assessment:   Still has a mild limp but overall doing well.    Plan:   Continue decreasing left hip discomfort increasing rom, flexibility, mobility, balance, strength, endurance and function for return to normal ADL.    Hal Oswald, PTA

## 2024-01-24 NOTE — PROGRESS NOTES
Pt is here for occasion chest pain.     Fuv, concerns of sinus infection, suture irritation and med refill.         Chief Complaint:  No chief complaint on file.  History Of Present Illness:   Kiara Leyva is a 63 y.o. female presenting for an office visit.     1/18/24: sick visit.    1/24/24: usually if she drinks something she'll get rid of a chest pain sensation. There was a day and she had the feeling and she drank about a gallon of water and it didn't go away. The following day she felt back to normal.   We did refill omeprazole.            PMH: acid reflux, osteoarthritis of the hip and of the knee, carcinoid tumor found on a colonoscopy that was resected and a normal colonoscopy afterwards in 2017, hypertension, eczema, overactive bladder. History of b/l hip replacements.      Left knee- swollen. more bothersome recently. is on diclofenac. March 2023 update: thinking about aquatic therapy. April 11, 2023 update: she went to therapy one time, but she is getting anterior sharp pain on her patella, xrays were done and were read as unremarkable. we discussed possibly related from referred left hip pain. June 23, 2023- agreed on left knee injection.      She also saw orthopedic surgery for her bilateral hips and was diagnosed with osteoarthritis.  - right hip replacement Dec 2020.  - she may have TKA in the future.   - Left hip total hip replacement Nov 2023.     Suture: left hip laterally- scarred over from Nov 2023 surgery. Doing ok.      HTN- hctz. well controlled today in the office.      GERD- omeprazole.      Overactive bladder: She did see urogynecology. Seen Jan 2023.      GI specialist- through [facility]- she has routine follow up.   Colonoscopy Nov 11, 2020- reportedly looks good and follow up in 5 years. July 2022 update: she states she saw GI about a year ago and is doing well.      Stress- she lost her mom June 13, 2020. she's an only child. she is sad about it. March 2023 update: carvalho, but  "sleeping ok.      Past surgical history: Carcinoid tumor resection, tubal ligation. B/l hip replacement.      Allergies: No known drug allergies.        Social history: Quit cigarette smoking in 2012, 2-3 drinks of alcohol week. Denies illicit drug use. She works at a [employer] center.      Declines flu shot.      Mammogram: June 2022: benign     COVID: vaccinated, no booster.     Healthcare team:  - Carlos- urogyn  - Marcel- ortho hip    ALL: NKDA     Flu shot:  - declines     Immunizations:   - utd per pt.     Colonoscopy: next due 2025.      GYN: has a GYN.                       Review of Systems:     Negative  Constitutional:   - fever   - chills   - night sweats  - unexpected weight change  - changes in sleep     Eyes:   - loss of vision  - double vision  - floaters            Musculoskeletal:   - bone pain  - muscle pain  - joint pain     Respiratory:   - shortness of breath  - difficulty breathing  - wheezing     Neurological:   - loss of consciousness  - tremors  - dizzy spells  - numbness   - tingling     Gastrointestinal:   - abdominal pain  - nausea  - vomiting  - constipation  - diarrhea  - bloody stools  - loss of bowel control           Genitourinary:   - urinary incontinence  - increased urinary frequency  - painful urination  - blood in urine     Skin:   - Rash  - lumps or bumps  - worrisome moles     Endocrine:   - excessive thirst  - feeling too hot  - feeling too cold  - feeling tired  - fatigue      Hematologic/Lymphatic:   - swollen glands  - blood clotting problems  - easy bruising.     Psychological:   - feelings of depression  - feelings of anxiety.          Positive  Psychological:   - feeling generally happy  - feeling safe at home            Last Recorded Vitals:  Vitals:    01/18/24 1330   BP: 114/70   Pulse: 98   Weight: 81.1 kg (178 lb 12.8 oz)   Height: 1.651 m (5' 5\")        Past Medical History:  She has a past medical history of Arthritis, Asthma, Carcinoid tumor, Eczema, GERD " (gastroesophageal reflux disease), Hyperlipidemia, Hypertension, OAB (overactive bladder), and Vitamin D deficiency.     Past Surgical History:  She has a past surgical history that includes Tubal ligation (07/07/2016); Other surgical history; Colonoscopy; Total hip arthroplasty (Right); and Hip Arthroplasty (Left, 11/29/2023).     Social History:  She reports that she quit smoking about 12 years ago. Her smoking use included cigarettes. She has a 25.00 pack-year smoking history. She has never used smokeless tobacco. She reports current alcohol use of about 1.0 standard drink of alcohol per week. She reports that she does not use drugs.     Family History:  Family History   Problem Relation Name Age of Onset    Coronary artery disease Mother          CABG    Other (homicicde) Father      Hypertension Father      Breast cancer Mother's Sister      Colon cancer Mother's Brother      Ovarian cysts Maternal Cousin       Allergies:  Patient has no known allergies.     Outpatient Medications:  Current Outpatient Medications   Medication Instructions    albuterol 90 mcg/actuation inhaler 1-2 puffs, inhalation, EVERY 4 TO 6 HOURS AS NEEDED<BR>    amitriptyline (ELAVIL) 10 mg, oral, Nightly    amoxicillin (Amoxil) 500 mg capsule TAKE 4 CAPSULES BY MOUTH 1 HOUR BEFORE DENTAL APPOINTMENT<BR>    azelastine (Optivar) 0.05 % ophthalmic solution 1 drop, ophthalmic (eye), 2 times daily PRN, affected eye    biotin 5,000 mcg tablet,disintegrating 1 tablet, oral, Daily    chlorhexidine (Peridex) 0.12 % solution SWISH AND SPIT 15ML FOR 30 SECONDS NIGHT PRIOR TO SURGERY AND MORNING OF SURGERY    cholecalciferol (Vitamin D-3) 50 mcg (2,000 unit) capsule 1 capsule, oral, Daily    clobetasol (Temovate) 0.05 % cream APPLY sparingly to affected area Twice daily for 2 weeks, then once daily as needed.<BR>    cyanocobalamin (Vitamin B-12) 1,000 mcg tablet 1 tablet, oral, Daily    cyclobenzaprine (Flexeril) 10 mg tablet 1 tablet, oral, Nightly  PRN    diclofenac sodium (VOLTAREN XR) 100 mg, oral, Daily    diclofenac sodium (VOLTAREN) 4 g, Topical, 2 times daily PRN, APPLY SPARINGLY TO AFFECTED AREA EVERY DAY    ergocalciferol (VITAMIN D-2) 1,250 mcg, oral, Weekly    fluticasone (Flonase) 50 mcg/actuation nasal spray 1 spray, Each Nostril, Daily PRN    hydroCHLOROthiazide (HYDRODiuril) 25 mg tablet 1 tablet, oral, Daily    ketorolac (TORADOL) 10 mg, oral, Every 6 hours PRN    minoxidil (Loniten) 2.5 mg tablet 0.5 tablets, oral, Daily    montelukast (Singulair) 10 mg tablet 1 tablet, oral, Daily PRN, As directed    Myrbetriq 50 mg, oral, Daily    oxybutynin XL (DITROPAN-XL) 15 mg, oral, 2 times daily    pantoprazole (PROTONIX) 40 mg, oral, Daily before breakfast, Do not crush, chew, or split.    simvastatin (Zocor) 20 mg tablet         Physical Exam:  GENERAL: Well developed, well nourished, alert and cooperative, and appears to be in no acute distress.     PSYCH: mood pleasant and appropriate     HEAD: normocephalic     EYES: PERRL, EOMI. vision is grossly intact.        NOSE:  Nares patent b/l.   No bleeding nasal polyps. No nasal discharge.     THROAT:    Oropharynx clear.  No inflammation, swelling, exudate, or lesions.        NECK: Neck supple, non-tender.   No masses, no thyromegaly.  No anterior cervical lymphadenopathy.     CARDIAC:   RRR.     Chest wall: some reproducible left sided chest wall pain.    LUNGS: Good respiratory effort.  Clear to auscultation b/l without rales, rhonchi, wheezing.     ABD: soft, nontender       GAIT: Normal             SKIN: Skin normal color  no lesions or eruptions.            Last Labs:  CBC -  Lab Results   Component Value Date    WBC 7.0 11/30/2023    HGB 10.3 (L) 11/30/2023    HCT 29.7 (L) 11/30/2023    MCV 83 11/30/2023     11/30/2023        CMP -  Lab Results   Component Value Date    CALCIUM 7.9 (L) 11/30/2023    PROT 6.9 04/07/2023    ALBUMIN 4.2 04/07/2023    AST 17 04/07/2023    ALT 13 04/07/2023     ALKPHOS 73 04/07/2023    BILITOT 0.7 04/07/2023        LIPID PANEL -  Lab Results   Component Value Date    CHOL 225 (H) 04/07/2023    TRIG 127 04/07/2023    HDL 65.1 04/07/2023    CHHDL 3.5 04/07/2023    LDLF 135 (H) 04/07/2023    VLDL 25 04/07/2023           Lab Results   Component Value Date    HGBA1C 5.5 04/07/2023                Continue with your healthcare team- re-establish with urogyn.     Follow up here in 6 months.       Regarding referrals, you can call the following phone number to attempt to schedule: [phone].  Another potential phone number is: [phone] ([phone]).  The number for pediatric referrals is: [phone].    1. Gastroesophageal reflux disease without esophagitis           Possible chest pain from food not moving through esophagus fast enough, or due to acid reflux.    Recommend taking omeprazole 40mg twice a day for the next two week to calm the acid down. After the two weeks, you can simply take it once a day.    For now- stop protonix.    If your chest pains worsen- go to the ER       Nicholas Barr, DO

## 2024-01-31 ENCOUNTER — TREATMENT (OUTPATIENT)
Dept: PHYSICAL THERAPY | Facility: CLINIC | Age: 64
End: 2024-01-31
Payer: COMMERCIAL

## 2024-01-31 DIAGNOSIS — G89.18 ACUTE POSTOPERATIVE PAIN OF LEFT HIP: ICD-10-CM

## 2024-01-31 DIAGNOSIS — Z47.89 ORTHOPEDIC AFTERCARE: ICD-10-CM

## 2024-01-31 DIAGNOSIS — M25.652 STIFFNESS OF LEFT HIP JOINT: Primary | ICD-10-CM

## 2024-01-31 DIAGNOSIS — M25.552 ACUTE POSTOPERATIVE PAIN OF LEFT HIP: ICD-10-CM

## 2024-01-31 DIAGNOSIS — M16.12 PRIMARY OSTEOARTHRITIS OF LEFT HIP: ICD-10-CM

## 2024-01-31 PROCEDURE — 97110 THERAPEUTIC EXERCISES: CPT | Mod: GP,CQ

## 2024-01-31 PROCEDURE — 97112 NEUROMUSCULAR REEDUCATION: CPT | Mod: GP,CQ

## 2024-01-31 ASSESSMENT — PAIN SCALES - GENERAL: PAINLEVEL_OUTOF10: 3

## 2024-01-31 ASSESSMENT — PAIN - FUNCTIONAL ASSESSMENT: PAIN_FUNCTIONAL_ASSESSMENT: 0-10

## 2024-01-31 NOTE — PROGRESS NOTES
Physical Therapy  Physical Therapy Treatment    Patient Name: Kiara Leyva  MRN: [id number]  Today's Date: 1/31/2024  Time Calculation  Start Time: 1350  Stop Time: 1445  Time Calculation (min): 55 min    Insurance:  Visit number: 5 of 20  Authorization info: No auth required  Insurance Type: [place]  Cert date start: 1/4/2024  Cert date end: 4/3/2024                General   Reason for visit: Lt. SUZI 11/29/2023   Referred by: CRISTIANA Rod MD    Current Problem  1. Stiffness of left hip joint        2. Orthopedic aftercare            Precautions  Post-Surgical Precautions: Left hip precautions    Pain Assessment: 0-10  Pain Score: 3    Subjective:   Patient reports that she has a little bit of hip discomfort.  Maybe a 3 today.  HEP Performed:  Yes    Objective:   Patient has a mild limp and continues ambulating with a standard cane for balance and support.    Treatments:   Bike- 4'  DBE 2x1.5'  Bosu lunge alternating- 1.5'  Biodex balance LOS L11- 23%, 52%  KB 4kg tandem stand R/L, L/R cw/ccw- 1' ea dir.  Hamstring curl 20# 1x25  Leg press 60# 1x25  Hip ab/ad 25#/25# 2x20 ea.  Multihip ext 22# R/L x 22 ea.  SS R/L Q/HF- 1' ea.  Ice- 15'    Charges: TE 2, NME 1 (CQ Modifier) ([place] Ins- No modalities or vaso)    Assessment:   Had to lower the weight on the hip ab/ad.  Otherwise seems to be progressing well.    Plan:   Continue decreasing left hip discomfort increasing rom, flexibility, mobility, balance, strength, endurance and function for return to normal ADL.    Hal Oswald, PTA

## 2024-02-07 ENCOUNTER — TREATMENT (OUTPATIENT)
Dept: PHYSICAL THERAPY | Facility: CLINIC | Age: 64
End: 2024-02-07
Payer: COMMERCIAL

## 2024-02-07 DIAGNOSIS — M25.652 STIFFNESS OF LEFT HIP JOINT: Primary | ICD-10-CM

## 2024-02-07 DIAGNOSIS — G89.18 ACUTE POSTOPERATIVE PAIN OF LEFT HIP: ICD-10-CM

## 2024-02-07 DIAGNOSIS — M25.552 ACUTE POSTOPERATIVE PAIN OF LEFT HIP: ICD-10-CM

## 2024-02-07 DIAGNOSIS — Z47.89 ORTHOPEDIC AFTERCARE: ICD-10-CM

## 2024-02-07 DIAGNOSIS — M16.12 PRIMARY OSTEOARTHRITIS OF LEFT HIP: ICD-10-CM

## 2024-02-07 PROCEDURE — 97112 NEUROMUSCULAR REEDUCATION: CPT | Mod: GP,CQ

## 2024-02-07 PROCEDURE — 97110 THERAPEUTIC EXERCISES: CPT | Mod: GP,CQ

## 2024-02-07 ASSESSMENT — PAIN - FUNCTIONAL ASSESSMENT: PAIN_FUNCTIONAL_ASSESSMENT: 0-10

## 2024-02-07 ASSESSMENT — PAIN SCALES - GENERAL: PAINLEVEL_OUTOF10: 0 - NO PAIN

## 2024-02-07 NOTE — PROGRESS NOTES
Physical Therapy  Physical Therapy Treatment    Patient Name: Kiara Leyva  MRN: [id number]  Today's Date: 2/7/2024  Time Calculation  Start Time: 1455  Stop Time: 1535  Time Calculation (min): 40 min    Insurance:  Visit number: 6 of 20  Authorization info: No auth required  Insurance Type: [place]  Cert date start: 1/4/2024  Cert date end: 4/3/2024                General   Reason for visit: Lt. SUZI 11/29/2023   Referred by: CRISTIANA Rod MD    Current Problem  1. Stiffness of left hip joint        2. Orthopedic aftercare            Precautions  Post-Surgical Precautions: Left hip precautions    Pain Assessment: 0-10  Pain Score: 0 - No pain    Subjective:   Patient reports that she feels better and has no pain today.  HEP Performed:  Yes    Objective:   Patient has a mild limp and continues ambulating with a standard cane for balance and support.    Treatments:   Bike- 3'  DBE 2x1.5'  Bosu lunge alternating- 1.5'  Biodex balance LOS L11- 45%, 51%  KB 4kg tandem stand R/L, L/R cw/ccw- 1' ea dir.  Hamstring curl 25# 1x20  Leg press 65# 1x20  Hip ab/ad 32.5#/32.5# 2x15 ea.  Multihip ext 22# R/L x 25 ea.  SS R/L Q/HF- 1.5' ea.    Charges: TE 2, NME 1 (CQ Modifier) ([place] Ins- No modalities or vaso)    Assessment:   Did very well today.  Appears to be getting close to discharge.    Plan:   Continue decreasing left hip discomfort increasing rom, flexibility, mobility, balance, strength, endurance and function for return to normal ADL.    Hal Oswald, PTA

## 2024-02-12 ENCOUNTER — TELEPHONE (OUTPATIENT)
Dept: PRIMARY CARE | Facility: CLINIC | Age: 64
End: 2024-02-12
Payer: COMMERCIAL

## 2024-02-13 ENCOUNTER — OFFICE VISIT (OUTPATIENT)
Dept: ORTHOPEDIC SURGERY | Facility: CLINIC | Age: 64
End: 2024-02-13
Payer: COMMERCIAL

## 2024-02-13 ENCOUNTER — TREATMENT (OUTPATIENT)
Dept: PHYSICAL THERAPY | Facility: CLINIC | Age: 64
End: 2024-02-13
Payer: COMMERCIAL

## 2024-02-13 VITALS — BODY MASS INDEX: 29.59 KG/M2 | WEIGHT: 177.6 LBS | HEIGHT: 65 IN

## 2024-02-13 DIAGNOSIS — Z47.89 ORTHOPEDIC AFTERCARE: ICD-10-CM

## 2024-02-13 DIAGNOSIS — G89.18 ACUTE POSTOPERATIVE PAIN OF LEFT HIP: ICD-10-CM

## 2024-02-13 DIAGNOSIS — M16.12 PRIMARY OSTEOARTHRITIS OF LEFT HIP: Primary | ICD-10-CM

## 2024-02-13 DIAGNOSIS — M16.12 PRIMARY OSTEOARTHRITIS OF LEFT HIP: ICD-10-CM

## 2024-02-13 DIAGNOSIS — M25.652 STIFFNESS OF LEFT HIP JOINT: Primary | ICD-10-CM

## 2024-02-13 DIAGNOSIS — M25.552 ACUTE POSTOPERATIVE PAIN OF LEFT HIP: ICD-10-CM

## 2024-02-13 PROCEDURE — 99024 POSTOP FOLLOW-UP VISIT: CPT | Performed by: ORTHOPAEDIC SURGERY

## 2024-02-13 PROCEDURE — 1036F TOBACCO NON-USER: CPT | Performed by: ORTHOPAEDIC SURGERY

## 2024-02-13 PROCEDURE — 97110 THERAPEUTIC EXERCISES: CPT | Mod: GP | Performed by: PHYSICAL THERAPIST

## 2024-02-13 PROCEDURE — 1160F RVW MEDS BY RX/DR IN RCRD: CPT | Performed by: ORTHOPAEDIC SURGERY

## 2024-02-13 PROCEDURE — 1126F AMNT PAIN NOTED NONE PRSNT: CPT | Performed by: ORTHOPAEDIC SURGERY

## 2024-02-13 PROCEDURE — 97112 NEUROMUSCULAR REEDUCATION: CPT | Mod: GP | Performed by: PHYSICAL THERAPIST

## 2024-02-13 PROCEDURE — 1159F MED LIST DOCD IN RCRD: CPT | Performed by: ORTHOPAEDIC SURGERY

## 2024-02-13 ASSESSMENT — PAIN - FUNCTIONAL ASSESSMENT: PAIN_FUNCTIONAL_ASSESSMENT: 0-10

## 2024-02-13 ASSESSMENT — PAIN SCALES - GENERAL: PAINLEVEL_OUTOF10: 4

## 2024-02-13 ASSESSMENT — PAIN DESCRIPTION - DESCRIPTORS: DESCRIPTORS: SHARP

## 2024-02-13 NOTE — LETTER
February 13, 2024     Patient: Kiara Leyva   YOB: 1960   Date of Visit: 2/13/2024       To Whom It May Concern:    It is my medical opinion that Kiara Leyva  may return to work on 2/26/24 with no heavy lifting and use of cane as needed .    If you have any questions or concerns, please don't hesitate to call [phone].         Sincerely,        Guru Rod MD

## 2024-02-13 NOTE — PROGRESS NOTES
Month status post total hip arthroplasty doing well she still in physical therapy some minor lateral hip pain  On exam she has a fairly steady gait using a cane for assistance  No leg length inequality incision looks fine assessment doing well reviewed precautions expectations follow-up annually

## 2024-02-13 NOTE — PROGRESS NOTES
"Physical Therapy  Physical Therapy Treatment    Patient Name: Kiara Leyva  MRN: [id number]  Today's Date: 2/13/2024       Insurance:  Visit number: 7 of 20  Authorization info: No auth required  Insurance Type: [place]  Cert date start: 1/4/2024  Cert date end: 4/3/2024                General   Reason for visit: Lt. SUZI 11/29/2023   Referred by: CRISTIANA Rod MD    Current Problem  No diagnosis found.            Subjective:   Patient reports that she feels better and has no pain today. She had an appointment with Dr. Rod today and was told she should continue PT as long as she is noticing improvement.   HEP Performed:  Yes    Objective:   Patient has a mild limp and continues ambulating with a standard cane for balance and support.    Treatments:   - Bike- 5'  - DBE 2x2'  - modified tandem balance 1' R/L, L/R  - Bosu lunge alternating- 1.5'  - 6\" step ups x 20 B  - seated LAQ 3 x 10 with 2# ankle weight  - standing HSC 3 x 10 with 2# ankle weight   - Standing hip abduction 3 x 10   - standing heel raises     Not performed this visit:   Biodex balance LOS L11- 45%, 51%  Hamstring curl 25# 1x20  Leg press 65# 1x20  Hip ab/ad 32.5#/32.5# 2x15 ea.  Multihip ext 22# R/L x 25 ea.  SS R/L Q/HF- 1.5' ea.    Charges: TE 2, NME 1  ([place] Ins- No modalities or vaso)    Assessment:   Patient reports she notices more muscle recruitment and a \"good\" soreness when performing body weight/ankle weight exercises compared to strengthening exercises on machines. She continues to present with generalized hip weakness as presented by fatigue towards the end of her repetitions, therefore, would continue to benefit from skilled PT.        Plan:   Continue 1x/week for 4 more weeks to ensure proper progress is made in terms of LE strength and balance to allow for patient to ambulate independently and transition back to work safely.     Guerita Stafford, PT  "

## 2024-02-14 ENCOUNTER — APPOINTMENT (OUTPATIENT)
Dept: PHYSICAL THERAPY | Facility: CLINIC | Age: 64
End: 2024-02-14
Payer: COMMERCIAL

## 2024-02-15 ENCOUNTER — TELEPHONE (OUTPATIENT)
Dept: PRIMARY CARE | Facility: CLINIC | Age: 64
End: 2024-02-15
Payer: COMMERCIAL

## 2024-02-15 DIAGNOSIS — M16.12 PRIMARY OSTEOARTHRITIS OF LEFT HIP: ICD-10-CM

## 2024-02-15 DIAGNOSIS — E55.9 VITAMIN D DEFICIENCY, UNSPECIFIED: ICD-10-CM

## 2024-02-15 DIAGNOSIS — G89.18 ACUTE POST-OPERATIVE PAIN: ICD-10-CM

## 2024-02-15 RX ORDER — ERGOCALCIFEROL 1.25 MG/1
1 CAPSULE ORAL
Qty: 6 CAPSULE | Refills: 1 | Status: SHIPPED | OUTPATIENT
Start: 2024-02-15

## 2024-02-16 DIAGNOSIS — N39.41 URGE INCONTINENCE: Primary | ICD-10-CM

## 2024-02-16 RX ORDER — AMOXICILLIN 875 MG/1
875 TABLET, FILM COATED ORAL 2 TIMES DAILY
Qty: 20 TABLET | Refills: 0 | Status: SHIPPED | OUTPATIENT
Start: 2024-02-16 | End: 2024-02-26

## 2024-02-20 ENCOUNTER — APPOINTMENT (OUTPATIENT)
Dept: PHYSICAL THERAPY | Facility: CLINIC | Age: 64
End: 2024-02-20
Payer: COMMERCIAL

## 2024-02-21 ENCOUNTER — TREATMENT (OUTPATIENT)
Dept: PHYSICAL THERAPY | Facility: CLINIC | Age: 64
End: 2024-02-21
Payer: COMMERCIAL

## 2024-02-21 DIAGNOSIS — M25.652 STIFFNESS OF LEFT HIP JOINT: ICD-10-CM

## 2024-02-21 DIAGNOSIS — M16.12 PRIMARY OSTEOARTHRITIS OF LEFT HIP: ICD-10-CM

## 2024-02-21 DIAGNOSIS — G89.18 ACUTE POSTOPERATIVE PAIN OF LEFT HIP: ICD-10-CM

## 2024-02-21 DIAGNOSIS — Z47.89 ORTHOPEDIC AFTERCARE: Primary | ICD-10-CM

## 2024-02-21 DIAGNOSIS — M25.552 ACUTE POSTOPERATIVE PAIN OF LEFT HIP: ICD-10-CM

## 2024-02-21 PROCEDURE — 97112 NEUROMUSCULAR REEDUCATION: CPT | Mod: GP | Performed by: PHYSICAL THERAPIST

## 2024-02-21 PROCEDURE — 97110 THERAPEUTIC EXERCISES: CPT | Mod: GP | Performed by: PHYSICAL THERAPIST

## 2024-02-21 NOTE — PROGRESS NOTES
"Physical Therapy  Physical Therapy Treatment    Patient Name: Kiara Leyva  MRN: [id number]  Today's Date: 2/21/2024  Time Calculation  Start Time: 1519  Stop Time: 1558  Time Calculation (min): 39 min    Insurance:  Visit number: 8 of 20  Authorization info: No auth required  Insurance Type: [place]  Cert date start: 1/4/2024  Cert date end: 4/3/2024                General   Reason for visit: Lt. SUZI 11/29/2023   Referred by: CRISTIANA Rod MD    Current Problem  1. Orthopedic aftercare        2. Stiffness of left hip joint                    Subjective:   Patient reports she feels much better and is able to walk much better since her previous appointment. Reports muscle soreness for about 24 hours following her previous appointment, but feels that she really turned a corner with walking since. Reports 0/10 pain upon arrival. Reports she hasn't been utilizing her cane in her house. Patient goes back to work on Monday.    HEP Performed:  Yes    Objective:   Patient has a mild limp and continues ambulating with a standard cane for balance and support.    Treatments:   - Bike- 5'  - DBE 2x2'  - modified tandem balance 1' R/L, L/R  - Bosu lunge alternating- 2'  - 6\" step ups x 20 B (fwd)  - 4\" step ups x 10 (lateral)   - seated LAQ 3 x 10 with 2# ankle weight  - Walking in hallway without cane x 2 laps      standing HSC 3 x 10 with 2# ankle weight    Standing hip abduction 3 x 10    standing heel raises     Charges: TE 2, NME 1  ([place] Ins- No modalities or vaso)    Assessment:   Patient notes leg fatigue after performing walking for 4 minutes, otherwise tolerated today's session very well. She does continue to present with LE weakness therefore encouraged compliance with her HEP.        Plan:   Continue 1x/week for 3 more weeks to ensure proper progress is made in terms of LE strength and balance to allow for patient to ambulate independently and transition back to work safely.     Guerita Stafford, PT  "

## 2024-02-22 ENCOUNTER — APPOINTMENT (OUTPATIENT)
Dept: PRIMARY CARE | Facility: CLINIC | Age: 64
End: 2024-02-22
Payer: COMMERCIAL

## 2024-02-29 ENCOUNTER — TREATMENT (OUTPATIENT)
Dept: PHYSICAL THERAPY | Facility: CLINIC | Age: 64
End: 2024-02-29
Payer: COMMERCIAL

## 2024-02-29 DIAGNOSIS — M16.12 PRIMARY OSTEOARTHRITIS OF LEFT HIP: ICD-10-CM

## 2024-02-29 DIAGNOSIS — G89.18 ACUTE POSTOPERATIVE PAIN OF LEFT HIP: ICD-10-CM

## 2024-02-29 DIAGNOSIS — M25.652 STIFFNESS OF LEFT HIP JOINT: ICD-10-CM

## 2024-02-29 DIAGNOSIS — M25.552 ACUTE POSTOPERATIVE PAIN OF LEFT HIP: ICD-10-CM

## 2024-02-29 DIAGNOSIS — Z47.89 ORTHOPEDIC AFTERCARE: ICD-10-CM

## 2024-02-29 PROCEDURE — 97110 THERAPEUTIC EXERCISES: CPT | Mod: GP | Performed by: PHYSICAL THERAPIST

## 2024-02-29 NOTE — PROGRESS NOTES
Physical Therapy  Physical Therapy Treatment    Patient Name: Kiara Leyva  MRN: [id number]  Today's Date: 2/29/2024  Time Calculation  Start Time: 1632  Stop Time: 1702  Time Calculation (min): 30 min    Insurance:  Visit number: 9 of 20  Authorization info: No auth required  Insurance Type: [place]  Cert date start: 1/4/2024  Cert date end: 4/3/2024                General   Reason for visit: Lt. SUZI 11/29/2023   Referred by: CRISTIANA Rod MD    Current Problem  1. Acute postoperative pain of left hip  Follow Up In Physical Therapy      2. Stiffness of left hip joint  Follow Up In Physical Therapy      3. Orthopedic aftercare  Follow Up In Physical Therapy      4. Primary osteoarthritis of left hip [M16.12]  Follow Up In Physical Therapy                  Subjective:   Patient reports BL leg stiffness when walking because she went back to work on Monday. Reports hip soreness, likely due to being on her feet for longer than usual this week.     HEP Performed:  Yes    Objective:   Patient has a small, pin hole sized opening, almost seems that a stitch is coming through the skin over the incision.    Moderate quad, hamstring, and calf stiffness/flexibility limitation in BL legs.     Treatments:   - Stepper x 3'  - Dorsiflexor stretch   - DBE 2x2'  - Hamstring stretch x 1' B at step  - gentle hip flexor stretch x 1' B  - slant board stretch x 1' B    Provided HEP handout and reviewed exercises:   Access Code: [email or web address]  URL: [email or web address]  Date: 02/29/2024  Prepared by: Guerita Stafford    Exercises  - Seated Hamstring Stretch  - 1 x daily - 7 x weekly - 1 sets - 2 reps - 30 second hold  - Long Sitting Calf Stretch with Strap  - 1 x daily - 7 x weekly - 1 sets - 2 reps - 30 second hold  - Quadricep Stretch with Chair and Counter Support  - 1 x daily - 7 x weekly - 1 sets - 2 reps - 30 second hold    Not performed this visit:   modified tandem balance 1' R/L, L/R   Bosu lunge alternating- 2'    "6\" step ups x 20 B (fwd)   4\" step ups x 10 (lateral)    seated LAQ 3 x 10 with 2# ankle weight   Walking in hallway without cane x 2 laps    standing HSC 3 x 10 with 2# ankle weight    Standing hip abduction 3 x 10    standing heel raises     Charges: TE 2 ([place] Ins- No modalities or vaso)    Assessment:   Patient presents with stiffness in her quads, hamstrings, and calves BL, likely due to returning to work and being on her feet much more than usual this week. Stiffness did improve with stretching. Encouraged compliance with stretches for her HEP, especially on days where she is working, to alleviate stiffness. Did not perform aquatics this visit because patient has a pin hole sized opening in her incision. Incision must completely be closed to perform aquatics.     Plan:   Recheck next visit     Guerita Stafford, PT  "

## 2024-03-04 DIAGNOSIS — I10 HYPERTENSION, UNSPECIFIED TYPE: Primary | ICD-10-CM

## 2024-03-04 RX ORDER — HYDROCHLOROTHIAZIDE 25 MG/1
25 TABLET ORAL DAILY
Qty: 90 TABLET | Refills: 1 | Status: SHIPPED | OUTPATIENT
Start: 2024-03-04

## 2024-03-05 ENCOUNTER — OFFICE VISIT (OUTPATIENT)
Dept: ORTHOPEDIC SURGERY | Facility: CLINIC | Age: 64
End: 2024-03-05
Payer: COMMERCIAL

## 2024-03-05 VITALS — WEIGHT: 177 LBS | BODY MASS INDEX: 29.49 KG/M2 | HEIGHT: 65 IN

## 2024-03-05 DIAGNOSIS — M16.12 PRIMARY OSTEOARTHRITIS OF LEFT HIP: Primary | ICD-10-CM

## 2024-03-05 PROCEDURE — 99024 POSTOP FOLLOW-UP VISIT: CPT | Performed by: ORTHOPAEDIC SURGERY

## 2024-03-05 PROCEDURE — 1125F AMNT PAIN NOTED PAIN PRSNT: CPT | Performed by: ORTHOPAEDIC SURGERY

## 2024-03-05 PROCEDURE — 1160F RVW MEDS BY RX/DR IN RCRD: CPT | Performed by: ORTHOPAEDIC SURGERY

## 2024-03-05 PROCEDURE — 1159F MED LIST DOCD IN RCRD: CPT | Performed by: ORTHOPAEDIC SURGERY

## 2024-03-05 PROCEDURE — 1126F AMNT PAIN NOTED NONE PRSNT: CPT | Performed by: ORTHOPAEDIC SURGERY

## 2024-03-05 PROCEDURE — 1036F TOBACCO NON-USER: CPT | Performed by: ORTHOPAEDIC SURGERY

## 2024-03-05 ASSESSMENT — PAIN - FUNCTIONAL ASSESSMENT: PAIN_FUNCTIONAL_ASSESSMENT: 0-10

## 2024-03-05 ASSESSMENT — PAIN DESCRIPTION - DESCRIPTORS: DESCRIPTORS: OTHER (COMMENT)

## 2024-03-05 ASSESSMENT — PAIN SCALES - GENERAL: PAINLEVEL_OUTOF10: 2

## 2024-03-05 NOTE — PROGRESS NOTES
Chief complaint is retained suture  Under incision there is a small noninfected appearing area where there is a visible stitch surfacing  This is removed today easily reviewed precautions expectations follow-up annually

## 2024-03-07 ENCOUNTER — TREATMENT (OUTPATIENT)
Dept: PHYSICAL THERAPY | Facility: CLINIC | Age: 64
End: 2024-03-07
Payer: COMMERCIAL

## 2024-03-07 DIAGNOSIS — M25.652 STIFFNESS OF LEFT HIP JOINT: ICD-10-CM

## 2024-03-07 DIAGNOSIS — M25.552 ACUTE POSTOPERATIVE PAIN OF LEFT HIP: ICD-10-CM

## 2024-03-07 DIAGNOSIS — M16.12 PRIMARY OSTEOARTHRITIS OF LEFT HIP: ICD-10-CM

## 2024-03-07 DIAGNOSIS — Z47.89 ORTHOPEDIC AFTERCARE: ICD-10-CM

## 2024-03-07 DIAGNOSIS — G89.18 ACUTE POSTOPERATIVE PAIN OF LEFT HIP: ICD-10-CM

## 2024-03-07 PROCEDURE — 97110 THERAPEUTIC EXERCISES: CPT | Mod: GP | Performed by: PHYSICAL THERAPIST

## 2024-03-07 NOTE — PROGRESS NOTES
Physical Therapy  Physical Therapy Treatment, Recheck, and Discharge    Patient Name: Kiara Leyva  MRN: [id number]  Today's Date: 3/27/2024  Time Calculation  Start Time: 1445  Stop Time: 1515  Time Calculation (min): 30 min      Insurance:  Visit number: 10 of 20  Authorization info: No auth required  Insurance Type: [place]  Cert date start: 1/4/2024  Cert date end: 4/3/2024                  General   Reason for visit: Lt. SUZI 11/29/2023   Referred by: CRISTIANA Rod MD    Current Problem  1. Acute postoperative pain of left hip  Follow Up In Physical Therapy      2. Stiffness of left hip joint  Follow Up In Physical Therapy      3. Orthopedic aftercare  Follow Up In Physical Therapy      4. Primary osteoarthritis of left hip [M16.12]  Follow Up In Physical Therapy                  Subjective:   Patient reports her hip is feeling significantly better. She has been able to walk around at work without difficulty and only occasionally utilizes her cane. She reports the muscle stiffness from her previous visit has also resolved as she is getting used to being on her feet for work.     % of PLOF: 80%    HEP Performed:  Yes    Objective:   Hip PROM (Degrees)                             (R)                    (L)  Flexion:             124                  105 (demonstrated when patient reached forward to tie shoe; reviewed precautions)                                           Extension:                                                          Abduction:         25                  39                              Adduction:                                                         ER:                       16                   30                       IR:                       15                    17                                                             Strength Testing     Core/Abdominals:   Hip                             (R)                    (L)  Flexion:                4+/5                4+/5                     "                                       Extension:         4+/5                        4+/5                                              Abduction:       4+/5                       4/5                                                  Adduction:        4+/5                       4+/5                                                 ER:                       4/5                      4/5                         IR:                        4/5                      4/5                                                       Functional Screening  Squat: Able to perform to mid depth (about 60 deg) without difficulty  Lunge:  not tested   SL Balance: R: 9 seconds, L: 5 seconds  Lateral Step Down: no compensation noted on 6\" step, mild trunk lean noted on 8\" step        Posture: mild forward flexed posture with anterior pelvic tilt         Palpation:  Minimal tenderness noted over glute med tendon, otherwise no tenderness noted.         Flexibility: WNL        Orthotics:  N/A        Gait: patient ambulates without an assistive device with      No red flag signs or symptoms or DVT.       Treatments:   - Stepper x 3'  - Dorsiflexor stretch   - DBE 2x2'  - Hamstring stretch x 1' B at step  - gentle hip flexor stretch x 1' B  - slant board stretch x 1' B  - recheck  - Objective measures     Provided HEP handout and reviewed exercises:   Access Code: [email or web address]  URL: [email or web address]  Date: 02/29/2024  Prepared by: Guerita Stafford    Exercises  - Seated Hamstring Stretch  - 1 x daily - 7 x weekly - 1 sets - 2 reps - 30 second hold  - Long Sitting Calf Stretch with Strap  - 1 x daily - 7 x weekly - 1 sets - 2 reps - 30 second hold  - Quadricep Stretch with Chair and Counter Support  - 1 x daily - 7 x weekly - 1 sets - 2 reps - 30 second hold    Not performed this visit:   modified tandem balance 1' R/L, L/R   Bosu lunge alternating- 2'   6\" step ups x 20 B (fwd)   4\" step ups x 10 (lateral)    seated LAQ 3 x 10 " with 2# ankle weight   Walking in hallway without cane x 2 laps    standing HSC 3 x 10 with 2# ankle weight    Standing hip abduction 3 x 10    standing heel raises     Charges: TE 2 ([place] [employer]- No modalities or vaso)    Assessment:   Kiara demonstrates improved LE strength, balance, flexibility, pain, and overall function. She has returned to work, and has either met or made good progress towards all of her goals. She also demonstrates excellent independence with her HEP. For these reasons, patient is requesting discharge due to insurance visit limitations. This is reasonable as she has made good progress towards her goals and has minimal to no functional limitations in regards to her hip. Patient will be discharged to HEP. Stressed importance of compliance with HEP for optimal outcomes.         Plan:   Discharge as patient has made good progress towards goals and patient is also requesting due to 20 visit limit for insurance and has used half of the visits allotted for the year.     Guerita Stafford, PT

## 2024-04-01 DIAGNOSIS — M17.10 PRIMARY OSTEOARTHRITIS OF KNEE, UNSPECIFIED LATERALITY: Primary | ICD-10-CM

## 2024-04-01 DIAGNOSIS — L30.9 ECZEMA, UNSPECIFIED TYPE: ICD-10-CM

## 2024-04-01 RX ORDER — TRIAMCINOLONE ACETONIDE 0.25 MG/G
1 OINTMENT TOPICAL 2 TIMES DAILY
Qty: 30 G | Refills: 1 | Status: SHIPPED | OUTPATIENT
Start: 2024-04-01

## 2024-04-01 RX ORDER — DICLOFENAC SODIUM 10 MG/G
4 GEL TOPICAL 2 TIMES DAILY PRN
Qty: 100 G | Refills: 1 | Status: SHIPPED | OUTPATIENT
Start: 2024-04-01 | End: 2024-05-20

## 2024-04-04 ENCOUNTER — OFFICE VISIT (OUTPATIENT)
Dept: PRIMARY CARE | Facility: CLINIC | Age: 64
End: 2024-04-04
Payer: COMMERCIAL

## 2024-04-04 VITALS — HEIGHT: 65 IN | WEIGHT: 177 LBS | BODY MASS INDEX: 29.49 KG/M2

## 2024-04-04 DIAGNOSIS — M17.12 OSTEOARTHRITIS OF LEFT KNEE, UNSPECIFIED OSTEOARTHRITIS TYPE: Primary | ICD-10-CM

## 2024-04-04 PROCEDURE — 1160F RVW MEDS BY RX/DR IN RCRD: CPT | Performed by: FAMILY MEDICINE

## 2024-04-04 PROCEDURE — 99213 OFFICE O/P EST LOW 20 MIN: CPT | Performed by: FAMILY MEDICINE

## 2024-04-04 PROCEDURE — 20610 DRAIN/INJ JOINT/BURSA W/O US: CPT | Performed by: FAMILY MEDICINE

## 2024-04-04 PROCEDURE — 1159F MED LIST DOCD IN RCRD: CPT | Performed by: FAMILY MEDICINE

## 2024-04-04 PROCEDURE — 1036F TOBACCO NON-USER: CPT | Performed by: FAMILY MEDICINE

## 2024-04-04 RX ORDER — TRIAMCINOLONE ACETONIDE 40 MG/ML
40 INJECTION, SUSPENSION INTRA-ARTICULAR; INTRAMUSCULAR
Status: COMPLETED | OUTPATIENT
Start: 2024-04-04 | End: 2024-04-04

## 2024-04-04 RX ORDER — LIDOCAINE HYDROCHLORIDE 10 MG/ML
1 INJECTION INFILTRATION; PERINEURAL
Status: COMPLETED | OUTPATIENT
Start: 2024-04-04 | End: 2024-04-04

## 2024-04-04 RX ADMIN — LIDOCAINE HYDROCHLORIDE 1 ML: 10 INJECTION INFILTRATION; PERINEURAL at 16:10

## 2024-04-04 RX ADMIN — TRIAMCINOLONE ACETONIDE 40 MG: 40 INJECTION, SUSPENSION INTRA-ARTICULAR; INTRAMUSCULAR at 16:10

## 2024-04-04 ASSESSMENT — ENCOUNTER SYMPTOMS
DEPRESSION: 0
LOSS OF SENSATION IN FEET: 0
OCCASIONAL FEELINGS OF UNSTEADINESS: 0

## 2024-04-04 NOTE — PROGRESS NOTES
Pt is here for knee pian/injection. No concerns.     Pt is here for occasion chest pain.     Fuv, concerns of sinus infection, suture irritation and med refill.         Chief Complaint:  No chief complaint on file.  History Of Present Illness:   Kiara Leyva is a 63 y.o. female presenting for an office visit.         1/24/24: usually if she drinks something she'll get rid of a chest pain sensation. There was a day and she had the feeling and she drank about a gallon of water and it didn't go away. The following day she felt back to normal.   We did refill omeprazole.            PMH: acid reflux, osteoarthritis of the hip and of the knee, carcinoid tumor found on a colonoscopy that was resected and a normal colonoscopy afterwards in 2017, hypertension, eczema, overactive bladder. History of b/l hip replacements.      Left knee- swollen. more bothersome recently. is on diclofenac. March 2023 update: thinking about aquatic therapy. April 11, 2023 update: she went to therapy one time  June 23, 2023- agreed on left knee injection.   Nov 2023- got a left knee injection from ortho.   April 4, 2024- agreed on left knee injection. Off and on, but walking makes it worse. Location- anterior knee, unsure swelling. We discussed another Dr. Rod referral specifically for her knee.      She also saw orthopedic surgery for her bilateral hips and was diagnosed with osteoarthritis.  - right hip replacement Dec 2020.  - she may have TKA in the future.   - Left hip total hip replacement Nov 2023.     Suture: left hip laterally- scarred over from Nov 2023 surgery. Doing ok.      HTN- hctz. well controlled today in the office.      GERD- omeprazole.      Overactive bladder: She did see urogynecology. Seen Jan 2023.      GI specialist- through Replaced by [facility]- she has routine follow up.   Colonoscopy Nov 11, 2020- reportedly looks good and follow up in 5 years. July 2022 update: she states she saw GI about a year ago and is doing well.  "     Stress- she lost her mom June 13, 2020. she's an only child. she is sad about it. March 2023 update: carvalho, but sleeping ok.      Past surgical history: Carcinoid tumor resection, tubal ligation. B/l hip replacement.      Allergies: No known drug allergies.        Social history: Quit cigarette smoking in 2012, 2-3 drinks of alcohol week. Denies illicit drug use. She works at a [employer] center.      Declines flu shot.      Mammogram: June 2022: benign     COVID: vaccinated, no booster.     Healthcare team:  - Carlos- urogyn  - Marcel- ortho hip    ALL: NKDA     Flu shot:  - declines     Immunizations:   - utd per pt.     Colonoscopy: next due 2025.      GYN: has a GYN.                       Review of Systems:     Negative  Constitutional:   - fever   - chills   - night sweats  - unexpected weight change  - changes in sleep     Eyes:   - loss of vision  - double vision  - floaters            Musculoskeletal:   - bone pain  - muscle pain  - joint pain     Respiratory:   - shortness of breath  - difficulty breathing  - wheezing     Neurological:   - loss of consciousness  - tremors  - dizzy spells  - numbness   - tingling     Gastrointestinal:   - abdominal pain  - nausea  - vomiting  - constipation  - diarrhea  - bloody stools  - loss of bowel control           Genitourinary:   - urinary incontinence  - increased urinary frequency  - painful urination  - blood in urine     Skin:   - Rash  - lumps or bumps  - worrisome moles     Endocrine:   - excessive thirst  - feeling too hot  - feeling too cold  - feeling tired  - fatigue      Hematologic/Lymphatic:   - swollen glands  - blood clotting problems  - easy bruising.     Psychological:   - feelings of depression  - feelings of anxiety.          Positive  Psychological:   - feeling generally happy  - feeling safe at home            Last Recorded Vitals:  Vitals:    01/18/24 1330   BP: 114/70   Pulse: 98   Weight: 81.1 kg (178 lb 12.8 oz)   Height: 1.651 m (5' 5\") "        Past Medical History:  She has a past medical history of Arthritis, Asthma, Carcinoid tumor, Eczema, GERD (gastroesophageal reflux disease), Hyperlipidemia, Hypertension, OAB (overactive bladder), and Vitamin D deficiency.     Past Surgical History:  She has a past surgical history that includes Tubal ligation (07/07/2016); Other surgical history; Colonoscopy; Total hip arthroplasty (Right); and Hip Arthroplasty (Left, 11/29/2023).     Social History:  She reports that she quit smoking about 12 years ago. Her smoking use included cigarettes. She has a 25.00 pack-year smoking history. She has never used smokeless tobacco. She reports current alcohol use of about 1.0 standard drink of alcohol per week. She reports that she does not use drugs.     Family History:  Family History   Problem Relation Name Age of Onset    Coronary artery disease Mother          CABG    Other (homicicde) Father      Hypertension Father      Breast cancer Mother's Sister      Colon cancer Mother's Brother      Ovarian cysts Maternal Cousin       Allergies:  Patient has no known allergies.     Outpatient Medications:  Current Outpatient Medications   Medication Instructions    albuterol 90 mcg/actuation inhaler 1-2 puffs, inhalation, EVERY 4 TO 6 HOURS AS NEEDED<BR>    amitriptyline (ELAVIL) 10 mg, oral, Nightly    amoxicillin (Amoxil) 500 mg capsule TAKE 4 CAPSULES BY MOUTH 1 HOUR BEFORE DENTAL APPOINTMENT<BR>    azelastine (Optivar) 0.05 % ophthalmic solution 1 drop, ophthalmic (eye), 2 times daily PRN, affected eye    biotin 5,000 mcg tablet,disintegrating 1 tablet, oral, Daily    chlorhexidine (Peridex) 0.12 % solution SWISH AND SPIT 15ML FOR 30 SECONDS NIGHT PRIOR TO SURGERY AND MORNING OF SURGERY    cholecalciferol (Vitamin D-3) 50 mcg (2,000 unit) capsule 1 capsule, oral, Daily    clobetasol (Temovate) 0.05 % cream APPLY sparingly to affected area Twice daily for 2 weeks, then once daily as needed.<BR>    cyanocobalamin (Vitamin  B-12) 1,000 mcg tablet 1 tablet, oral, Daily    cyclobenzaprine (Flexeril) 10 mg tablet 1 tablet, oral, Nightly PRN    diclofenac sodium (VOLTAREN XR) 100 mg, oral, Daily    diclofenac sodium (VOLTAREN) 4 g, Topical, 2 times daily PRN, APPLY SPARINGLY TO AFFECTED AREA EVERY DAY    ergocalciferol (VITAMIN D-2) 1,250 mcg, oral, Weekly    fluticasone (Flonase) 50 mcg/actuation nasal spray 1 spray, Each Nostril, Daily PRN    hydroCHLOROthiazide (HYDRODiuril) 25 mg tablet 1 tablet, oral, Daily    ketorolac (TORADOL) 10 mg, oral, Every 6 hours PRN    minoxidil (Loniten) 2.5 mg tablet 0.5 tablets, oral, Daily    montelukast (Singulair) 10 mg tablet 1 tablet, oral, Daily PRN, As directed    Myrbetriq 50 mg, oral, Daily    oxybutynin XL (DITROPAN-XL) 15 mg, oral, 2 times daily    pantoprazole (PROTONIX) 40 mg, oral, Daily before breakfast, Do not crush, chew, or split.    simvastatin (Zocor) 20 mg tablet         Physical Exam:  GENERAL: Well developed, well nourished, alert and cooperative, and appears to be in no acute distress.     PSYCH: mood pleasant and appropriate     HEAD: normocephalic     EYES: PERRL, EOMI. vision is grossly intact.        NOSE:  Nares patent b/l.   No bleeding nasal polyps. No nasal discharge.     THROAT:    Oropharynx clear.  No inflammation, swelling, exudate, or lesions.        NECK: Neck supple, non-tender.   No masses, no thyromegaly.  No anterior cervical lymphadenopathy.     CARDIAC:   RRR.         LUNGS: Good respiratory effort.  Clear to auscultation b/l without rales, rhonchi, wheezing.     ABD: soft, nontender       GAIT: steady    Left knee: no effusion. Positive patellar apprehension testing. Prepatellar bursa swelling. No medial joint line ttp.          SKIN: Skin normal color  no lesions or eruptions.      Joint Injection Large/Arthrocentesis: L knee on 4/4/2024 4:10 PM  Indications: pain and joint swelling  Details: 22 G needle, superolateral approach  Medications: 40 mg  triamcinolone acetonide 40 mg/mL; 1 mL lidocaine 10 mg/mL (1 %)  Outcome: tolerated well, no immediate complications    RD in room for injection  Procedure, treatment alternatives, risks and benefits explained, specific risks discussed. Consent was given by the patient. Immediately prior to procedure a time out was called to verify the correct patient, procedure, equipment, support staff and site/side marked as required. Patient was prepped and draped in the usual sterile fashion.              Last Labs:  CBC -  Lab Results   Component Value Date    WBC 7.0 11/30/2023    HGB 10.3 (L) 11/30/2023    HCT 29.7 (L) 11/30/2023    MCV 83 11/30/2023     11/30/2023        CMP -  Lab Results   Component Value Date    CALCIUM 7.9 (L) 11/30/2023    PROT 6.9 04/07/2023    ALBUMIN 4.2 04/07/2023    AST 17 04/07/2023    ALT 13 04/07/2023    ALKPHOS 73 04/07/2023    BILITOT 0.7 04/07/2023        LIPID PANEL -  Lab Results   Component Value Date    CHOL 225 (H) 04/07/2023    TRIG 127 04/07/2023    HDL 65.1 04/07/2023    CHHDL 3.5 04/07/2023    LDLF 135 (H) 04/07/2023    VLDL 25 04/07/2023           Lab Results   Component Value Date    HGBA1C 5.5 04/07/2023            1. Osteoarthritis of left knee, unspecified osteoarthritis type           Left knee injection done today.  Expectant management discussed.    Referral placed to orthopedic surgery, recommend following up with your surgeon Dr. Rod.  For further evaluation of your left knee.    Continue with your healthcare team- re-establish with urogyn.     Follow-up here as needed    Regarding referrals, you can call the following phone number to attempt to schedule: [phone].  Another potential phone number is: [phone] ([phone]).  The number for pediatric referrals is: [phone].           Nicholas Barr,

## 2024-04-08 ENCOUNTER — OFFICE VISIT (OUTPATIENT)
Dept: ORTHOPEDIC SURGERY | Facility: CLINIC | Age: 64
End: 2024-04-08
Payer: COMMERCIAL

## 2024-04-08 VITALS — WEIGHT: 177 LBS | BODY MASS INDEX: 29.49 KG/M2 | HEIGHT: 65 IN

## 2024-04-08 DIAGNOSIS — M25.562 LEFT KNEE PAIN, UNSPECIFIED CHRONICITY: ICD-10-CM

## 2024-04-08 DIAGNOSIS — S83.242S OTHER TEAR OF MEDIAL MENISCUS OF LEFT KNEE AS CURRENT INJURY, SEQUELA: Primary | ICD-10-CM

## 2024-04-08 PROCEDURE — 1159F MED LIST DOCD IN RCRD: CPT | Performed by: ORTHOPAEDIC SURGERY

## 2024-04-08 PROCEDURE — 99213 OFFICE O/P EST LOW 20 MIN: CPT | Performed by: ORTHOPAEDIC SURGERY

## 2024-04-08 PROCEDURE — 1160F RVW MEDS BY RX/DR IN RCRD: CPT | Performed by: ORTHOPAEDIC SURGERY

## 2024-04-08 PROCEDURE — 1125F AMNT PAIN NOTED PAIN PRSNT: CPT | Performed by: ORTHOPAEDIC SURGERY

## 2024-04-08 RX ORDER — TRAMADOL HYDROCHLORIDE 50 MG/1
50 TABLET ORAL EVERY 8 HOURS PRN
Qty: 28 TABLET | Refills: 0 | Status: SHIPPED | OUTPATIENT
Start: 2024-04-08

## 2024-04-08 ASSESSMENT — PAIN SCALES - GENERAL: PAINLEVEL_OUTOF10: 7

## 2024-04-08 ASSESSMENT — PAIN - FUNCTIONAL ASSESSMENT: PAIN_FUNCTIONAL_ASSESSMENT: 0-10

## 2024-04-08 ASSESSMENT — PAIN DESCRIPTION - DESCRIPTORS: DESCRIPTORS: ACHING;SHARP;THROBBING

## 2024-04-15 ENCOUNTER — HOSPITAL ENCOUNTER (OUTPATIENT)
Dept: RADIOLOGY | Facility: CLINIC | Age: 64
Discharge: HOME | End: 2024-04-15
Payer: COMMERCIAL

## 2024-04-15 ENCOUNTER — OFFICE VISIT (OUTPATIENT)
Dept: ORTHOPEDIC SURGERY | Facility: CLINIC | Age: 64
End: 2024-04-15
Payer: COMMERCIAL

## 2024-04-15 VITALS — HEIGHT: 65 IN | WEIGHT: 177 LBS | BODY MASS INDEX: 29.49 KG/M2

## 2024-04-15 DIAGNOSIS — M25.562 LEFT KNEE PAIN, UNSPECIFIED CHRONICITY: ICD-10-CM

## 2024-04-15 DIAGNOSIS — J06.9 UPPER RESPIRATORY TRACT INFECTION, UNSPECIFIED TYPE: ICD-10-CM

## 2024-04-15 DIAGNOSIS — S83.242S TEAR OF MEDIAL MENISCUS OF LEFT KNEE, CURRENT, UNSPECIFIED TEAR TYPE, SEQUELA: Primary | ICD-10-CM

## 2024-04-15 PROCEDURE — 1125F AMNT PAIN NOTED PAIN PRSNT: CPT | Performed by: ORTHOPAEDIC SURGERY

## 2024-04-15 PROCEDURE — 1160F RVW MEDS BY RX/DR IN RCRD: CPT | Performed by: ORTHOPAEDIC SURGERY

## 2024-04-15 PROCEDURE — 1036F TOBACCO NON-USER: CPT | Performed by: ORTHOPAEDIC SURGERY

## 2024-04-15 PROCEDURE — 1159F MED LIST DOCD IN RCRD: CPT | Performed by: ORTHOPAEDIC SURGERY

## 2024-04-15 PROCEDURE — 99213 OFFICE O/P EST LOW 20 MIN: CPT | Performed by: ORTHOPAEDIC SURGERY

## 2024-04-15 PROCEDURE — 73562 X-RAY EXAM OF KNEE 3: CPT | Mod: LEFT SIDE | Performed by: RADIOLOGY

## 2024-04-15 PROCEDURE — 73562 X-RAY EXAM OF KNEE 3: CPT | Mod: LT

## 2024-04-15 RX ORDER — OXYCODONE AND ACETAMINOPHEN 5; 325 MG/1; MG/1
1 TABLET ORAL EVERY 4 HOURS PRN
Qty: 20 TABLET | Refills: 0 | Status: SHIPPED | OUTPATIENT
Start: 2024-04-15 | End: 2024-04-22

## 2024-04-15 ASSESSMENT — PAIN SCALES - GENERAL: PAINLEVEL_OUTOF10: 7

## 2024-04-15 ASSESSMENT — PAIN - FUNCTIONAL ASSESSMENT: PAIN_FUNCTIONAL_ASSESSMENT: 0-10

## 2024-04-15 ASSESSMENT — PAIN DESCRIPTION - DESCRIPTORS: DESCRIPTORS: SHARP;SHOOTING

## 2024-04-15 NOTE — PROGRESS NOTES
Patient presents for left knee pain she was just injected last week and shortly before that by her primary care doctor had a mechanical type symptoms which is affecting above and below the knee but mostly medially is intermittent locking catching this is really been going on for years has been treated with a couple of injections and activity modifications no better  Past medical,family and social histories have been reviewed and are up to date.  All other body systems have been reviewed and are negative for complaint.  Constitutional: Well-developed well-nourished   Eyes: Sclerae anicteric, pupils equal and round  HENT: Normocephalic atraumatic  Cardiovascular: Pulses full, regular rate and rhythm  Respiratory: Breathing not labored, no wheezing  Integumentary: Skin intact, no lesions or rashes  Neurological: Sensation intact, no gross strength deficits, reflexes equal  Psychiatric: Alert oriented and appropriate  Hematologic/lymphatic: No lymphadenopathy  Left knee: No angular deformity, small effusion, tender medial joint line, full range of motion, no instability, positive Lakeshia's test  X-rays show only minimal degenerative change assessment likely mechanical problem with degenerative meniscus tear  Check MRI  Discussed arthroscopy

## 2024-04-17 RX ORDER — MONTELUKAST SODIUM 10 MG/1
TABLET ORAL
Qty: 90 TABLET | Refills: 0 | Status: SHIPPED | OUTPATIENT
Start: 2024-04-17

## 2024-04-21 NOTE — PROGRESS NOTES
Left knee pain and swelling no recurrent injury  No fevers or chills  Past medical,family and social histories have been reviewed and are up to date.  All other body systems have been reviewed and are negative for complaint.  Constitutional: Well-developed well-nourished   Eyes: Sclerae anicteric, pupils equal and round  HENT: Normocephalic atraumatic  Cardiovascular: Pulses full, regular rate and rhythm  Respiratory: Breathing not labored, no wheezing  Integumentary: Skin intact, no lesions or rashes  Neurological: Sensation intact, no gross strength deficits, reflexes equal  Psychiatric: Alert oriented and appropriate  Hematologic/lymphatic: No lymphadenopathy  Left knee: No angular deformity small effusion maximally tender medially mobility satisfactory  X-rays unremarkable  degenerative meniscus tear?  Plan injection activity modification May need further imaging

## 2024-04-24 ENCOUNTER — APPOINTMENT (OUTPATIENT)
Dept: PRIMARY CARE | Facility: CLINIC | Age: 64
End: 2024-04-24
Payer: COMMERCIAL

## 2024-04-28 DIAGNOSIS — M25.552 PAIN IN LEFT HIP: ICD-10-CM

## 2024-04-28 DIAGNOSIS — M25.551 PAIN IN RIGHT HIP: ICD-10-CM

## 2024-04-29 DIAGNOSIS — M25.552 LEFT HIP PAIN: Primary | ICD-10-CM

## 2024-04-29 RX ORDER — LORAZEPAM 0.5 MG/1
0.5 TABLET ORAL SEE ADMIN INSTRUCTIONS
Qty: 2 TABLET | Refills: 0 | Status: SHIPPED | OUTPATIENT
Start: 2024-04-29 | End: 2024-05-06

## 2024-04-29 RX ORDER — DICLOFENAC SODIUM 100 MG/1
100 TABLET, EXTENDED RELEASE ORAL DAILY
Qty: 90 TABLET | Refills: 0 | Status: SHIPPED | OUTPATIENT
Start: 2024-04-29

## 2024-04-30 ENCOUNTER — HOSPITAL ENCOUNTER (OUTPATIENT)
Dept: RADIOLOGY | Facility: CLINIC | Age: 64
Discharge: HOME | End: 2024-04-30
Payer: COMMERCIAL

## 2024-04-30 DIAGNOSIS — S83.242S TEAR OF MEDIAL MENISCUS OF LEFT KNEE, CURRENT, UNSPECIFIED TEAR TYPE, SEQUELA: ICD-10-CM

## 2024-04-30 PROCEDURE — 73721 MRI JNT OF LWR EXTRE W/O DYE: CPT | Mod: LT

## 2024-04-30 PROCEDURE — 73721 MRI JNT OF LWR EXTRE W/O DYE: CPT | Mod: LEFT SIDE | Performed by: RADIOLOGY

## 2024-05-14 ENCOUNTER — OFFICE VISIT (OUTPATIENT)
Dept: ORTHOPEDIC SURGERY | Facility: CLINIC | Age: 64
End: 2024-05-14
Payer: COMMERCIAL

## 2024-05-14 VITALS — WEIGHT: 176 LBS | HEIGHT: 65 IN | BODY MASS INDEX: 29.32 KG/M2

## 2024-05-14 DIAGNOSIS — M25.562 LEFT KNEE PAIN, UNSPECIFIED CHRONICITY: Primary | ICD-10-CM

## 2024-05-14 PROCEDURE — 1159F MED LIST DOCD IN RCRD: CPT | Performed by: ORTHOPAEDIC SURGERY

## 2024-05-14 PROCEDURE — 1125F AMNT PAIN NOTED PAIN PRSNT: CPT | Performed by: ORTHOPAEDIC SURGERY

## 2024-05-14 PROCEDURE — 99213 OFFICE O/P EST LOW 20 MIN: CPT | Performed by: ORTHOPAEDIC SURGERY

## 2024-05-14 PROCEDURE — 1160F RVW MEDS BY RX/DR IN RCRD: CPT | Performed by: ORTHOPAEDIC SURGERY

## 2024-05-14 PROCEDURE — 1036F TOBACCO NON-USER: CPT | Performed by: ORTHOPAEDIC SURGERY

## 2024-05-14 RX ORDER — OXYCODONE AND ACETAMINOPHEN 5; 325 MG/1; MG/1
1 TABLET ORAL EVERY 4 HOURS PRN
Qty: 28 TABLET | Refills: 0 | Status: SHIPPED | OUTPATIENT
Start: 2024-05-14 | End: 2024-05-21

## 2024-05-14 ASSESSMENT — PAIN SCALES - GENERAL: PAINLEVEL_OUTOF10: 6

## 2024-05-14 ASSESSMENT — PAIN - FUNCTIONAL ASSESSMENT: PAIN_FUNCTIONAL_ASSESSMENT: 0-10

## 2024-05-14 ASSESSMENT — PAIN DESCRIPTION - DESCRIPTORS: DESCRIPTORS: ACHING;SHARP

## 2024-05-14 NOTE — PROGRESS NOTES
Patient presents with persistent left knee pain not having groin pain from her recent hip surgery she had a recent MRI which only shows some patellofemoral arthritis and no meniscal pathology  A cortisone injection was not that helpful  She is requesting pain medication and further treatment for the knee  Past medical,family and social histories have been reviewed and are up to date.  All other body systems have been reviewed and are negative for complaint.  Constitutional: Well-developed well-nourished   Eyes: Sclerae anicteric, pupils equal and round  HENT: Normocephalic atraumatic  Cardiovascular: Pulses full, regular rate and rhythm  Respiratory: Breathing not labored, no wheezing  Integumentary: Skin intact, no lesions or rashes  Neurological: Sensation intact, no gross strength deficits, reflexes equal  Psychiatric: Alert oriented and appropriate  Hematologic/lymphatic: No lymphadenopathy  Left knee: She is some tenderness and crepitus with anterior compression of the small effusion but full range of motion  Assessment patellofemoral arthrosis no better despite activity modification and injections etc. recommend trial of viscosupplementation  Short-term pain control with prescription for oxycodone

## 2024-05-17 DIAGNOSIS — M17.10 PRIMARY OSTEOARTHRITIS OF KNEE, UNSPECIFIED LATERALITY: ICD-10-CM

## 2024-05-20 RX ORDER — DICLOFENAC SODIUM 10 MG/G
4 GEL TOPICAL 2 TIMES DAILY PRN
Qty: 100 G | Refills: 1 | Status: SHIPPED | OUTPATIENT
Start: 2024-05-20

## 2024-05-24 DIAGNOSIS — E55.9 VITAMIN D DEFICIENCY: Primary | ICD-10-CM

## 2024-05-31 ENCOUNTER — TELEPHONE (OUTPATIENT)
Dept: ORTHOPEDIC SURGERY | Facility: CLINIC | Age: 64
End: 2024-05-31
Payer: COMMERCIAL

## 2024-05-31 DIAGNOSIS — M17.12 OSTEOARTHRITIS OF LEFT KNEE, UNSPECIFIED OSTEOARTHRITIS TYPE: ICD-10-CM

## 2024-05-31 NOTE — TELEPHONE ENCOUNTER
6/19 Spoke with [facility] to confirm delivery date set for 6/20 6/13- Spoke with [facility] today and started a prior auth. Medication should be covered through pharmacy benefits.    Spoke with [facility] no prior authorization is required for gelsyn 3. Prescription needs to be sent.

## 2024-06-03 DIAGNOSIS — S83.242S TEAR OF MEDIAL MENISCUS OF LEFT KNEE, CURRENT, UNSPECIFIED TEAR TYPE, SEQUELA: ICD-10-CM

## 2024-06-03 RX ORDER — HYALURONATE SODIUM 16.8MG/2ML
16.8 SYRINGE (ML) INTRAARTICULAR
Qty: 2 ML | Refills: 0 | Status: SHIPPED | OUTPATIENT
Start: 2024-06-09

## 2024-06-03 RX ORDER — ACETAMINOPHEN 500 MG
2000 TABLET ORAL DAILY
Qty: 90 CAPSULE | Refills: 0 | Status: SHIPPED | OUTPATIENT
Start: 2024-06-03 | End: 2024-06-07 | Stop reason: SDUPTHER

## 2024-06-07 DIAGNOSIS — E55.9 VITAMIN D DEFICIENCY: ICD-10-CM

## 2024-06-07 RX ORDER — ACETAMINOPHEN 500 MG
2000 TABLET ORAL DAILY
Qty: 90 CAPSULE | Refills: 0 | Status: SHIPPED | OUTPATIENT
Start: 2024-06-07

## 2024-06-27 ENCOUNTER — TELEPHONE (OUTPATIENT)
Dept: UROLOGY | Facility: CLINIC | Age: 64
End: 2024-06-27

## 2024-06-27 ENCOUNTER — APPOINTMENT (OUTPATIENT)
Dept: PRIMARY CARE | Facility: CLINIC | Age: 64
End: 2024-06-27
Payer: COMMERCIAL

## 2024-06-27 DIAGNOSIS — R35.1 NOCTURIA: Primary | ICD-10-CM

## 2024-06-27 RX ORDER — AMITRIPTYLINE HYDROCHLORIDE 10 MG/1
10 TABLET, FILM COATED ORAL NIGHTLY
Qty: 90 TABLET | Refills: 1 | Status: SHIPPED | OUTPATIENT
Start: 2024-06-27

## 2024-06-27 NOTE — TELEPHONE ENCOUNTER
Pt called requesting a 90 day refill for:    amitriptyline (Elavil) 10 mg tablet     Asking for it to be sent  to:    [id number] - [place], [place] - [address] AT [address]

## 2024-07-11 ENCOUNTER — APPOINTMENT (OUTPATIENT)
Dept: PRIMARY CARE | Facility: CLINIC | Age: 64
End: 2024-07-11
Payer: COMMERCIAL

## 2024-07-11 VITALS
HEART RATE: 94 BPM | SYSTOLIC BLOOD PRESSURE: 100 MMHG | BODY MASS INDEX: 30.52 KG/M2 | DIASTOLIC BLOOD PRESSURE: 74 MMHG | WEIGHT: 183.4 LBS

## 2024-07-11 DIAGNOSIS — L29.9 ITCHING: ICD-10-CM

## 2024-07-11 DIAGNOSIS — M25.551 PAIN IN RIGHT HIP: ICD-10-CM

## 2024-07-11 DIAGNOSIS — M25.552 PAIN IN LEFT HIP: ICD-10-CM

## 2024-07-11 DIAGNOSIS — L65.9 HAIR LOSS: ICD-10-CM

## 2024-07-11 DIAGNOSIS — Z00.00 WELL ADULT EXAM: Primary | ICD-10-CM

## 2024-07-11 DIAGNOSIS — N32.81 OVERACTIVE BLADDER: ICD-10-CM

## 2024-07-11 PROCEDURE — 99397 PER PM REEVAL EST PAT 65+ YR: CPT | Performed by: FAMILY MEDICINE

## 2024-07-11 PROCEDURE — 85027 COMPLETE CBC AUTOMATED: CPT

## 2024-07-11 PROCEDURE — 1159F MED LIST DOCD IN RCRD: CPT | Performed by: FAMILY MEDICINE

## 2024-07-11 PROCEDURE — 1036F TOBACCO NON-USER: CPT | Performed by: FAMILY MEDICINE

## 2024-07-11 PROCEDURE — 3074F SYST BP LT 130 MM HG: CPT | Performed by: FAMILY MEDICINE

## 2024-07-11 PROCEDURE — 83036 HEMOGLOBIN GLYCOSYLATED A1C: CPT

## 2024-07-11 PROCEDURE — 3078F DIAST BP <80 MM HG: CPT | Performed by: FAMILY MEDICINE

## 2024-07-11 PROCEDURE — 84443 ASSAY THYROID STIM HORMONE: CPT

## 2024-07-11 PROCEDURE — 80053 COMPREHEN METABOLIC PANEL: CPT

## 2024-07-11 PROCEDURE — 36415 COLL VENOUS BLD VENIPUNCTURE: CPT

## 2024-07-11 PROCEDURE — 80061 LIPID PANEL: CPT

## 2024-07-11 RX ORDER — HYDROXYZINE HYDROCHLORIDE 10 MG/1
25 TABLET, FILM COATED ORAL EVERY 8 HOURS PRN
Qty: 90 TABLET | Refills: 0 | Status: SHIPPED | OUTPATIENT
Start: 2024-07-11

## 2024-07-11 RX ORDER — OXYBUTYNIN CHLORIDE 15 MG/1
15 TABLET, EXTENDED RELEASE ORAL DAILY
Qty: 90 TABLET | Refills: 0 | Status: SHIPPED | OUTPATIENT
Start: 2024-07-11

## 2024-07-11 RX ORDER — MINOXIDIL 2.5 MG/1
1.25 TABLET ORAL DAILY
Qty: 90 TABLET | Refills: 0 | Status: SHIPPED | OUTPATIENT
Start: 2024-07-11

## 2024-07-11 RX ORDER — DICLOFENAC SODIUM 100 MG/1
100 TABLET, EXTENDED RELEASE ORAL DAILY
Qty: 90 TABLET | Refills: 0 | Status: SHIPPED | OUTPATIENT
Start: 2024-07-11

## 2024-07-11 ASSESSMENT — ENCOUNTER SYMPTOMS
LOSS OF SENSATION IN FEET: 0
DEPRESSION: 0
OCCASIONAL FEELINGS OF UNSTEADINESS: 0

## 2024-07-11 NOTE — PROGRESS NOTES
Pt here for cpe and discuss pain issues             Chief Complaint:  No chief complaint on file.  History Of Present Illness:   Kiara Leyva is a 63 y.o. female presenting for an office visit.     7/11/24 visit: here for a physical.     Left knee- injection with me didn't last, went to ortho and injection didn't last. Scheduled for gel injections with ortho.     Feet swelling when she was on vacation. July 2- went on vacation, came back July 7. Still a little swollen.   Right leg is swollen with walking as well.   We discussed trying the gel injections and then if not improved- following up for a visit for pain in which we can draw blood work and consider other meds/referrals.              PMH: acid reflux, osteoarthritis of the hip and of the knee, carcinoid tumor found on a colonoscopy that was resected and a normal colonoscopy afterwards in 2017, hypertension, eczema, overactive bladder. History of b/l hip replacements.      Left knee- swollen. more bothersome recently. is on diclofenac. March 2023 update: thinking about aquatic therapy. April 11, 2023 update: she went to therapy one time  June 23, 2023- agreed on left knee injection.   Nov 2023- got a left knee injection from ortho.   April 4, 2024- agreed on left knee injection. Off and on, but walking makes it worse. Location- anterior knee, unsure swelling. We discussed another Dr. Rod referral specifically for her knee.      She also saw orthopedic surgery for her bilateral hips and was diagnosed with osteoarthritis.  - right hip replacement Dec 2020.  - she may have TKA in the future.   - Left hip total hip replacement Nov 2023.   - has gel injections for left knee scheduled in Aug 2024.     Suture: left hip laterally- scarred over from Nov 2023 surgery. Doing ok.      HTN- hctz. well controlled today in the office.      GERD- omeprazole.      Overactive bladder: She did see urogynecology. Seen Jan 2023.      GI specialist- through [facility]- she  has routine follow up.   Colonoscopy Nov 11, 2020- reportedly looks good and follow up in 5 years. July 2022 update: she states she saw GI about a year ago and is doing well.        Stress- she lost her mom June 13, 2020. she's an only child. she is sad about it. March 2023 update: carvalho, but sleeping ok.      Past surgical history: Carcinoid tumor resection, tubal ligation. B/l hip replacement.      Allergies: No known drug allergies.        Social history: Quit cigarette smoking in 2012, 2-3 drinks of alcohol week. Denies illicit drug use.   She works at a [employer].      Declines flu shot.      Mammogram: Aug 2023.      COVID: vaccinated, no booster.     Healthcare team:  - Carlos- urogymadi  - Marcel- ortho hip    ALL: NKDA     Flu shot:  - declines     Immunizations:   - utd per pt.     Colonoscopy: next due 2025.      GYN: has a GYN.                       Review of Systems:     Negative  Constitutional:   - fever   - chills   - night sweats  - unexpected weight change  - changes in sleep     Eyes:   - loss of vision  - double vision  - floaters              Respiratory:   - shortness of breath  - difficulty breathing  - wheezing     Neurological:   - loss of consciousness  - tremors  - dizzy spells  - numbness   - tingling     Gastrointestinal:   - abdominal pain  - nausea  - vomiting  - constipation  - diarrhea  - bloody stools  - loss of bowel control           Genitourinary:   - urinary incontinence  - increased urinary frequency  - painful urination  - blood in urine     Skin:   - Rash  - lumps or bumps  - worrisome moles     Endocrine:   - excessive thirst  - feeling too hot  - feeling too cold  - feeling tired  - fatigue      Hematologic/Lymphatic:   - swollen glands  - blood clotting problems  - easy bruising.     Psychological:   - feelings of depression  - feelings of anxiety.          Positive  Psychological:   - feeling generally happy  - feeling safe at home            Last Recorded  "Vitals:  Vitals:    01/18/24 1330   BP: 114/70   Pulse: 98   Weight: 81.1 kg (178 lb 12.8 oz)   Height: 1.651 m (5' 5\")        Past Medical History:  She has a past medical history of Arthritis, Asthma, Carcinoid tumor, Eczema, GERD (gastroesophageal reflux disease), Hyperlipidemia, Hypertension, OAB (overactive bladder), and Vitamin D deficiency.     Past Surgical History:  She has a past surgical history that includes Tubal ligation (07/07/2016); Other surgical history; Colonoscopy; Total hip arthroplasty (Right); and Hip Arthroplasty (Left, 11/29/2023).     Social History:  She reports that she quit smoking about 12 years ago. Her smoking use included cigarettes. She has a 25.00 pack-year smoking history. She has never used smokeless tobacco. She reports current alcohol use of about 1.0 standard drink of alcohol per week. She reports that she does not use drugs.     Family History:  Family History   Problem Relation Name Age of Onset    Coronary artery disease Mother          CABG    Other (homicicde) Father      Hypertension Father      Breast cancer Mother's Sister      Colon cancer Mother's Brother      Ovarian cysts Maternal Cousin       Allergies:  Patient has no known allergies.     Outpatient Medications:  Current Outpatient Medications   Medication Instructions    albuterol 90 mcg/actuation inhaler 1-2 puffs, inhalation, EVERY 4 TO 6 HOURS AS NEEDED<BR>    amitriptyline (ELAVIL) 10 mg, oral, Nightly    amoxicillin (Amoxil) 500 mg capsule TAKE 4 CAPSULES BY MOUTH 1 HOUR BEFORE DENTAL APPOINTMENT<BR>    azelastine (Optivar) 0.05 % ophthalmic solution 1 drop, ophthalmic (eye), 2 times daily PRN, affected eye    biotin 5,000 mcg tablet,disintegrating 1 tablet, oral, Daily    chlorhexidine (Peridex) 0.12 % solution SWISH AND SPIT 15ML FOR 30 SECONDS NIGHT PRIOR TO SURGERY AND MORNING OF SURGERY    cholecalciferol (Vitamin D-3) 50 mcg (2,000 unit) capsule 1 capsule, oral, Daily    clobetasol (Temovate) 0.05 % " cream APPLY sparingly to affected area Twice daily for 2 weeks, then once daily as needed.<BR>    cyanocobalamin (Vitamin B-12) 1,000 mcg tablet 1 tablet, oral, Daily    cyclobenzaprine (Flexeril) 10 mg tablet 1 tablet, oral, Nightly PRN    diclofenac sodium (VOLTAREN XR) 100 mg, oral, Daily    diclofenac sodium (VOLTAREN) 4 g, Topical, 2 times daily PRN, APPLY SPARINGLY TO AFFECTED AREA EVERY DAY    ergocalciferol (VITAMIN D-2) 1,250 mcg, oral, Weekly    fluticasone (Flonase) 50 mcg/actuation nasal spray 1 spray, Each Nostril, Daily PRN    hydroCHLOROthiazide (HYDRODiuril) 25 mg tablet 1 tablet, oral, Daily    ketorolac (TORADOL) 10 mg, oral, Every 6 hours PRN    minoxidil (Loniten) 2.5 mg tablet 0.5 tablets, oral, Daily    montelukast (Singulair) 10 mg tablet 1 tablet, oral, Daily PRN, As directed    Myrbetriq 50 mg, oral, Daily    oxybutynin XL (DITROPAN-XL) 15 mg, oral, 2 times daily    pantoprazole (PROTONIX) 40 mg, oral, Daily before breakfast, Do not crush, chew, or split.    simvastatin (Zocor) 20 mg tablet         Physical Exam:  GENERAL: Well developed, well nourished, alert and cooperative, and appears to be in no acute distress.     PSYCH: mood pleasant and appropriate     HEAD: normocephalic     EYES: PERRL, EOMI. vision is grossly intact.        NOSE:  Nares patent b/l.   No bleeding nasal polyps. No nasal discharge.     THROAT:    Oropharynx clear.  No inflammation, swelling, exudate, or lesions.        NECK: Neck supple, non-tender.   No masses, no thyromegaly.  No anterior cervical lymphadenopathy.     CARDIAC:   RRR.         LUNGS: Good respiratory effort.  Clear to auscultation b/l without rales, rhonchi, wheezing.     ABD: soft, nontender       GAIT: steady    Left knee: no effusion. Positive patellar apprehension testing. Prepatellar bursa swelling. No medial joint line ttp.          SKIN: Skin normal color  no lesions or eruptions.      Last Labs:  CBC -  Lab Results   Component Value Date     WBC 7.0 11/30/2023    HGB 10.3 (L) 11/30/2023    HCT 29.7 (L) 11/30/2023    MCV 83 11/30/2023     11/30/2023        CMP -  Lab Results   Component Value Date    CALCIUM 7.9 (L) 11/30/2023    PROT 6.9 04/07/2023    ALBUMIN 4.2 04/07/2023    AST 17 04/07/2023    ALT 13 04/07/2023    ALKPHOS 73 04/07/2023    BILITOT 0.7 04/07/2023        LIPID PANEL -  Lab Results   Component Value Date    CHOL 225 (H) 04/07/2023    TRIG 127 04/07/2023    HDL 65.1 04/07/2023    CHHDL 3.5 04/07/2023    LDLF 135 (H) 04/07/2023    VLDL 25 04/07/2023           Lab Results   Component Value Date    HGBA1C 5.5 04/07/2023       Physical today  MO PERIODIC PREVENTIVE MED EST PATIENT 63YRS& OLDER [[id number]]    Continue with ortho team and your other healthcare specialists.     Consider follow up if gel injections dont work with me for blood work for poss rheum issues.    Fasting lab work was ordered today. It is likely that your insurance will cover the testing, but if you have any concerns- please check with your insurance company before getting the blood work drawn. I will call you when I get the results.   Please do not eat for 12 hours before getting your blood work drawn (water is ok).     Mammogram ordered.   Regarding referrals, you can call the following phone number to attempt to schedule: [phone].  Another potential phone number is: [phone] ([phone]).  The number for pediatric referrals is: [phone].    Refills done today  - minoxidil- pt requesting for hair.   - oxybutynin- is requesting from me instead of from uro-gyn.        Nicholas Barr, DO

## 2024-07-12 LAB
ALBUMIN SERPL BCP-MCNC: 4.3 G/DL (ref 3.4–5)
ALP SERPL-CCNC: 70 U/L (ref 33–136)
ALT SERPL W P-5'-P-CCNC: 40 U/L (ref 7–45)
ANION GAP SERPL CALC-SCNC: 15 MMOL/L (ref 10–20)
AST SERPL W P-5'-P-CCNC: 35 U/L (ref 9–39)
BILIRUB SERPL-MCNC: 0.4 MG/DL (ref 0–1.2)
BUN SERPL-MCNC: 9 MG/DL (ref 6–23)
CALCIUM SERPL-MCNC: 9.4 MG/DL (ref 8.6–10.6)
CHLORIDE SERPL-SCNC: 103 MMOL/L (ref 98–107)
CHOLEST SERPL-MCNC: 210 MG/DL (ref 0–199)
CHOLESTEROL/HDL RATIO: 3.1
CO2 SERPL-SCNC: 26 MMOL/L (ref 21–32)
CREAT SERPL-MCNC: 0.48 MG/DL (ref 0.5–1.05)
EGFRCR SERPLBLD CKD-EPI 2021: >90 ML/MIN/1.73M*2
ERYTHROCYTE [DISTWIDTH] IN BLOOD BY AUTOMATED COUNT: 13.3 % (ref 11.5–14.5)
EST. AVERAGE GLUCOSE BLD GHB EST-MCNC: 103 MG/DL
GLUCOSE SERPL-MCNC: 102 MG/DL (ref 74–99)
HBA1C MFR BLD: 5.2 %
HCT VFR BLD AUTO: 38.1 % (ref 36–46)
HDLC SERPL-MCNC: 67.5 MG/DL
HGB BLD-MCNC: 13.1 G/DL (ref 12–16)
LDLC SERPL CALC-MCNC: 115 MG/DL
MCH RBC QN AUTO: 28.9 PG (ref 26–34)
MCHC RBC AUTO-ENTMCNC: 34.4 G/DL (ref 32–36)
MCV RBC AUTO: 84 FL (ref 80–100)
NON HDL CHOLESTEROL: 143 MG/DL (ref 0–149)
NRBC BLD-RTO: 0 /100 WBCS (ref 0–0)
PLATELET # BLD AUTO: 299 X10*3/UL (ref 150–450)
POTASSIUM SERPL-SCNC: 4 MMOL/L (ref 3.5–5.3)
PROT SERPL-MCNC: 6.6 G/DL (ref 6.4–8.2)
RBC # BLD AUTO: 4.54 X10*6/UL (ref 4–5.2)
SODIUM SERPL-SCNC: 140 MMOL/L (ref 136–145)
TRIGL SERPL-MCNC: 139 MG/DL (ref 0–149)
TSH SERPL-ACNC: 1.08 MIU/L (ref 0.44–3.98)
VLDL: 28 MG/DL (ref 0–40)
WBC # BLD AUTO: 5.2 X10*3/UL (ref 4.4–11.3)

## 2024-07-18 ENCOUNTER — APPOINTMENT (OUTPATIENT)
Dept: ORTHOPEDIC SURGERY | Facility: CLINIC | Age: 64
End: 2024-07-18
Payer: COMMERCIAL

## 2024-07-18 VITALS — BODY MASS INDEX: 30.49 KG/M2 | HEIGHT: 65 IN | WEIGHT: 183 LBS

## 2024-07-18 DIAGNOSIS — M17.12 PRIMARY OSTEOARTHRITIS OF LEFT KNEE: Primary | ICD-10-CM

## 2024-07-18 PROCEDURE — 1036F TOBACCO NON-USER: CPT | Performed by: ORTHOPAEDIC SURGERY

## 2024-07-18 PROCEDURE — 20610 DRAIN/INJ JOINT/BURSA W/O US: CPT | Performed by: ORTHOPAEDIC SURGERY

## 2024-07-18 PROCEDURE — 1159F MED LIST DOCD IN RCRD: CPT | Performed by: ORTHOPAEDIC SURGERY

## 2024-07-18 PROCEDURE — 3008F BODY MASS INDEX DOCD: CPT | Performed by: ORTHOPAEDIC SURGERY

## 2024-07-18 ASSESSMENT — PAIN SCALES - GENERAL: PAINLEVEL_OUTOF10: 5 - MODERATE PAIN

## 2024-07-18 ASSESSMENT — PAIN DESCRIPTION - DESCRIPTORS: DESCRIPTORS: THROBBING;ACHING

## 2024-07-18 ASSESSMENT — PAIN - FUNCTIONAL ASSESSMENT: PAIN_FUNCTIONAL_ASSESSMENT: 0-10

## 2024-07-18 NOTE — PROGRESS NOTES
L Inj/Asp: L knee on 7/18/2024 3:19 PM  Indications: pain  Details: 21 G needle, lateral approach  Medications: 16.8 mg sodium hyaluronate 16.8 mg/2 mL

## 2024-07-25 ENCOUNTER — APPOINTMENT (OUTPATIENT)
Dept: ORTHOPEDIC SURGERY | Facility: CLINIC | Age: 64
End: 2024-07-25
Payer: COMMERCIAL

## 2024-07-25 VITALS — BODY MASS INDEX: 30.49 KG/M2 | HEIGHT: 65 IN | WEIGHT: 183 LBS

## 2024-07-25 DIAGNOSIS — M17.12 PRIMARY OSTEOARTHRITIS OF LEFT KNEE: Primary | ICD-10-CM

## 2024-07-25 PROCEDURE — 3008F BODY MASS INDEX DOCD: CPT | Performed by: ORTHOPAEDIC SURGERY

## 2024-07-25 PROCEDURE — 1159F MED LIST DOCD IN RCRD: CPT | Performed by: ORTHOPAEDIC SURGERY

## 2024-07-25 PROCEDURE — 20610 DRAIN/INJ JOINT/BURSA W/O US: CPT | Performed by: ORTHOPAEDIC SURGERY

## 2024-07-25 PROCEDURE — 1036F TOBACCO NON-USER: CPT | Performed by: ORTHOPAEDIC SURGERY

## 2024-07-25 ASSESSMENT — PAIN DESCRIPTION - DESCRIPTORS: DESCRIPTORS: ACHING

## 2024-07-25 ASSESSMENT — PAIN SCALES - GENERAL: PAINLEVEL_OUTOF10: 5 - MODERATE PAIN

## 2024-07-25 NOTE — PROGRESS NOTES
L Inj/Asp: L knee on 7/25/2024 11:41 AM  Indications: pain  Details: 21 G needle, anterolateral approach  Medications: 16.8 mg sodium hyaluronate 16.8 mg/2 mL  Outcome: tolerated well, no immediate complications  Procedure, treatment alternatives, risks and benefits explained, specific risks discussed. Consent was given by the patient. Immediately prior to procedure a time out was called to verify the correct patient, procedure, equipment, support staff and site/side marked as required. Patient was prepped and draped in the usual sterile fashion.

## 2024-07-29 ENCOUNTER — TELEPHONE (OUTPATIENT)
Dept: UROLOGY | Facility: CLINIC | Age: 64
End: 2024-07-29

## 2024-08-01 ENCOUNTER — TELEMEDICINE (OUTPATIENT)
Dept: UROLOGY | Facility: CLINIC | Age: 64
End: 2024-08-01
Payer: COMMERCIAL

## 2024-08-01 ENCOUNTER — APPOINTMENT (OUTPATIENT)
Dept: ORTHOPEDIC SURGERY | Facility: CLINIC | Age: 64
End: 2024-08-01
Payer: COMMERCIAL

## 2024-08-01 VITALS — BODY MASS INDEX: 30.49 KG/M2 | WEIGHT: 183 LBS | HEIGHT: 65 IN

## 2024-08-01 DIAGNOSIS — N32.81 OVERACTIVE BLADDER: ICD-10-CM

## 2024-08-01 DIAGNOSIS — N95.2 ATROPHIC VAGINITIS: ICD-10-CM

## 2024-08-01 DIAGNOSIS — N81.89 PELVIC FLOOR WEAKNESS: ICD-10-CM

## 2024-08-01 DIAGNOSIS — M17.12 PRIMARY OSTEOARTHRITIS OF LEFT KNEE: Primary | ICD-10-CM

## 2024-08-01 DIAGNOSIS — N39.41 URGE INCONTINENCE: Primary | ICD-10-CM

## 2024-08-01 PROCEDURE — 1159F MED LIST DOCD IN RCRD: CPT | Performed by: OBSTETRICS & GYNECOLOGY

## 2024-08-01 PROCEDURE — 99442 PR PHYS/QHP TELEPHONE EVALUATION 11-20 MIN: CPT | Performed by: OBSTETRICS & GYNECOLOGY

## 2024-08-01 PROCEDURE — 1160F RVW MEDS BY RX/DR IN RCRD: CPT | Performed by: OBSTETRICS & GYNECOLOGY

## 2024-08-01 PROCEDURE — 1036F TOBACCO NON-USER: CPT | Performed by: OBSTETRICS & GYNECOLOGY

## 2024-08-01 PROCEDURE — 1036F TOBACCO NON-USER: CPT | Performed by: ORTHOPAEDIC SURGERY

## 2024-08-01 PROCEDURE — 3008F BODY MASS INDEX DOCD: CPT | Performed by: ORTHOPAEDIC SURGERY

## 2024-08-01 PROCEDURE — 20610 DRAIN/INJ JOINT/BURSA W/O US: CPT | Performed by: ORTHOPAEDIC SURGERY

## 2024-08-01 PROCEDURE — 1159F MED LIST DOCD IN RCRD: CPT | Performed by: ORTHOPAEDIC SURGERY

## 2024-08-01 RX ORDER — MIRABEGRON 50 MG/1
50 TABLET, FILM COATED, EXTENDED RELEASE ORAL DAILY
Qty: 90 TABLET | Refills: 3 | Status: SHIPPED | OUTPATIENT
Start: 2024-08-01 | End: 2025-08-01

## 2024-08-01 RX ORDER — OXYBUTYNIN CHLORIDE 15 MG/1
15 TABLET, EXTENDED RELEASE ORAL 2 TIMES DAILY
Qty: 180 TABLET | Refills: 3 | Status: SHIPPED | OUTPATIENT
Start: 2024-08-01

## 2024-08-01 ASSESSMENT — PAIN - FUNCTIONAL ASSESSMENT: PAIN_FUNCTIONAL_ASSESSMENT: NO/DENIES PAIN

## 2024-08-01 NOTE — PROGRESS NOTES
"Subjective   Patient ID: Kiara Leyva is a 64 y.o. female who presents for No chief complaint on file.  Today's visit was done virtually after appropriate consent from the patient. Virtual or Telephone Consent     An interactive audio  telecommunication system which permits real time communications between the patient at home and provider (at the distant site) was utilized to provide this telehealth service. Greater than 10 minutes were spent discussing the patients concerns and coordinating care   HPI  This visit was performed through telemedicine  64-year-old with mild vaginal atrophy, urge urinary incontinence, and mild pelvic floor weakness.     The patient has been taking Myrbetriq 50 mg and oxybutynin 15 mg twice daily. She is also utilizing 10 mg of amitriptyline for her nocturia complaints. She is overall very satisfied with her dual therapy. Having added the amitriptyline at night, the patient feels \"where I want to be\". She is satisfied with her current therapy and just requires refill on her medication.     She complaints of constipation, take stool softeners. She denies any other bowel related complaints, no fecal or flatal incontinence.     She denies any vaginal complaints, no abnormal vaginal bleeding or discharge.     She has no other complaints.    From Previous note  62-year-old with mild vaginal atrophy, urge urinary incontinence, and mild pelvic floor weakness.     The patient has been taking Myrbetriq 50 mg and oxybutynin 15 mg twice daily. She is also utilizing 10 mg of amitriptyline for her nocturia complaints. She is overall very satisfied with her dual therapy. Having added the amitriptyline at night, the patient feels \"where I want to be\". She is satisfied with her current therapy and just requires refill on her medication.     She complaints of constipation, take stool softeners. She denies any other bowel related complaints, no fecal or flatal incontinence.     She denies any vaginal " "complaints, no abnormal vaginal bleeding or discharge.     She has no other complaints.     From previous note  61-year-old presenting for follow-up with history of urge urinary incontinence.     The patient has been taking Myrbetriq 50 mg and oxybutynin 15 mg twice daily. She is overall very satisfied with her dual therapy. Having added the amitriptyline at night, the patient feels \"where I want to be\". Her primary care recently refilled her oxybutynin therapy but at a 10 mg dose. She is felt that this is inadequate to her complaints.     She wishes her prescription sent into her [facility] pharmacy.     She has no other complaints.     From previous note:  58-year-old presenting as a referral from Dr. Barr with complaints of long-standing urinary urgency and incontinence.     The patient has noted urinary urgency incontinence symptoms for at least the last 20 years. She states that she has utilized oxybutynin for many of these years with moderate improvement in her symptoms. She is now taking 15 mg once daily. She denies any dry mouth or constipation complaints. However she notes daytime frequency, urgency, and urge related incontinence. She wears 2 pads daily because of this daily leaking. She notes nocturia up to 4 times nightly as well as nightly enuresis. She denies any stress urinary incontinence symptoms. She generally feels that she empties her bladder well. She denies a history of nephrolithiasis, chronic urinary tract infections, or gross hematuria.     It appears that she was evaluated by Dr. Parisi in 2017 and was counseled to attempt a trial of Myrbetriq therapy. The patient does not believe she started this medication and was lost to follow-up.     The patient is \"somewhat\" sexually active. She denies any dyspareunia. She denies any abnormal vaginal discharge or bleeding. She does have episodic vaginal \"itching\" which she treats with talc. She denies any vaginal bulge symptoms. She denies any " fecal or flatal incontinence. She has episodic constipation which she treats through dietary changes     She has no other complaints.     Review of Systems  Constitutional: No fever, No chills and No fatigue.   Eyes: No vision problems and No dryness of the eyes.   ENT: No dry mouth, No hearing loss and No nosebleeds.   Cardiovascular: No chest pain, No palpitations and No orthopnea.   Respiratory: No shortness of breath, No cough and No wheezing.   Gastrointestinal: No abdominal pain, No constipation, No nausea, No diarrhea, No vomiting and No melena.   Genitourinary: As noted in HPI.   Musculoskeletal: No back pain, No myalgias, No muscle weakness, No joint swelling and No leg edema.   Integumentary: No rashes, No skin lesion and No itching.   Neurological: No headache, No numbness and No dizziness.   Psychiatric: No sleep disturbances, No anxiety and No depression.   Endocrine: No hot flashes, No loss of hair and No hirsutism.   Hematologic/Lymphatic: No swollen glands, No tendency for easy bleeding and No tendency for easy bruising.   All other systems have been reviewed and are negative for complaint.        Objective   Physical Exam  This visit was performed through telemedicine     Assessment/Plan   64-year-old with mild vaginal atrophy, urge urinary incontinence, and mild pelvic floor weakness.     #1 The patient will continue her dual therapy oxybutynin 15 mg twice daily and Myrbetriq 50 mg daily. A new prescription for the these medications were called into her [facility] pharmacy. We again discussed the [facility] OAB care pathway. We again discussed PTNS, Botox, and InterStim. The patient is overall quite satisfied and wishes to continue her present medical therapy. She is also had benefits with her amitriptyline at night and wishes to continue this moving forward.  A new prescription was sent into her pharmacy.     #2 the patient will follow-up in 12 months to discuss therapy.      CRISTIANA Gonzalez MD        Scribe Attestation  By signing my name below, I, Meg Rika Lake attest that this documentation has been prepared under the direction and in the presence of Pilo Gonzalez MD. All medical record entries made by the Scribe were at my direction or personally dictated by me. I have reviewed the chart and agree that the record accurately reflects my personal performance of the history, physical exam, discussion and plan.

## 2024-08-01 NOTE — PROGRESS NOTES
L Inj/Asp: L knee on 8/1/2024 3:19 PM  Indications: pain  Details: 21 G needle, lateral approach  Medications: 16.8 mg sodium hyaluronate 16.8 mg/2 mL

## 2024-08-26 ENCOUNTER — TELEPHONE (OUTPATIENT)
Dept: PRIMARY CARE | Facility: CLINIC | Age: 64
End: 2024-08-26
Payer: COMMERCIAL

## 2024-08-31 DIAGNOSIS — I10 HYPERTENSION, UNSPECIFIED TYPE: ICD-10-CM

## 2024-09-04 ENCOUNTER — TELEPHONE (OUTPATIENT)
Dept: UROLOGY | Facility: CLINIC | Age: 64
End: 2024-09-04

## 2024-09-04 RX ORDER — HYDROCHLOROTHIAZIDE 25 MG/1
25 TABLET ORAL DAILY
Qty: 90 TABLET | Refills: 1 | Status: SHIPPED | OUTPATIENT
Start: 2024-09-04

## 2024-09-04 NOTE — TELEPHONE ENCOUNTER
Pt called, she is having issues refilling mirabegron medication, was told there may be an issue with insurance.    Pharmacy is [facility] listed in chart.

## 2024-09-23 ENCOUNTER — APPOINTMENT (OUTPATIENT)
Dept: RADIOLOGY | Facility: HOSPITAL | Age: 64
End: 2024-09-23
Payer: COMMERCIAL

## 2024-09-24 DIAGNOSIS — E78.5 HYPERLIPIDEMIA, UNSPECIFIED: ICD-10-CM

## 2024-09-24 RX ORDER — SIMVASTATIN 20 MG/1
20 TABLET, FILM COATED ORAL NIGHTLY
Qty: 90 TABLET | Refills: 1 | Status: SHIPPED | OUTPATIENT
Start: 2024-09-24

## 2024-10-14 ENCOUNTER — APPOINTMENT (OUTPATIENT)
Dept: RADIOLOGY | Facility: HOSPITAL | Age: 64
End: 2024-10-14
Payer: COMMERCIAL

## 2024-10-24 DIAGNOSIS — N39.41 URGE INCONTINENCE: ICD-10-CM

## 2024-10-24 DIAGNOSIS — N32.81 OVERACTIVE BLADDER: ICD-10-CM

## 2024-10-24 RX ORDER — MIRABEGRON 50 MG/1
50 TABLET, FILM COATED, EXTENDED RELEASE ORAL DAILY
Qty: 90 TABLET | Refills: 3 | Status: SHIPPED | OUTPATIENT
Start: 2024-10-24 | End: 2025-10-24

## 2024-10-25 ENCOUNTER — TELEPHONE (OUTPATIENT)
Dept: UROLOGY | Facility: CLINIC | Age: 64
End: 2024-10-25

## 2024-10-25 NOTE — TELEPHONE ENCOUNTER
Pt is now requesting generic version of Myrbetriq 50 mg tablet extended release 24 hr 24 hr tablet be sent to [facility] in chart   Insurance will cover the medication

## 2024-11-08 ENCOUNTER — OFFICE VISIT (OUTPATIENT)
Dept: UROLOGY | Facility: CLINIC | Age: 64
End: 2024-11-08
Payer: COMMERCIAL

## 2024-11-08 VITALS
WEIGHT: 186.3 LBS | DIASTOLIC BLOOD PRESSURE: 89 MMHG | BODY MASS INDEX: 31 KG/M2 | SYSTOLIC BLOOD PRESSURE: 135 MMHG | HEART RATE: 105 BPM

## 2024-11-08 DIAGNOSIS — R35.1 NOCTURIA: ICD-10-CM

## 2024-11-08 DIAGNOSIS — N39.41 URGE INCONTINENCE: ICD-10-CM

## 2024-11-08 DIAGNOSIS — N32.81 OVERACTIVE BLADDER: ICD-10-CM

## 2024-11-08 PROCEDURE — 3075F SYST BP GE 130 - 139MM HG: CPT | Performed by: OBSTETRICS & GYNECOLOGY

## 2024-11-08 PROCEDURE — 3079F DIAST BP 80-89 MM HG: CPT | Performed by: OBSTETRICS & GYNECOLOGY

## 2024-11-08 PROCEDURE — 1160F RVW MEDS BY RX/DR IN RCRD: CPT | Performed by: OBSTETRICS & GYNECOLOGY

## 2024-11-08 PROCEDURE — 99417 PROLNG OP E/M EACH 15 MIN: CPT | Performed by: OBSTETRICS & GYNECOLOGY

## 2024-11-08 PROCEDURE — 99213 OFFICE O/P EST LOW 20 MIN: CPT | Performed by: OBSTETRICS & GYNECOLOGY

## 2024-11-08 PROCEDURE — 1159F MED LIST DOCD IN RCRD: CPT | Performed by: OBSTETRICS & GYNECOLOGY

## 2024-11-08 PROCEDURE — 1036F TOBACCO NON-USER: CPT | Performed by: OBSTETRICS & GYNECOLOGY

## 2024-11-08 RX ORDER — MIRABEGRON 50 MG/1
50 TABLET, FILM COATED, EXTENDED RELEASE ORAL DAILY
Qty: 90 TABLET | Refills: 3 | Status: SHIPPED | OUTPATIENT
Start: 2024-11-08 | End: 2024-11-08 | Stop reason: SDUPTHER

## 2024-11-08 RX ORDER — MIRABEGRON 50 MG/1
50 TABLET, FILM COATED, EXTENDED RELEASE ORAL DAILY
Qty: 90 TABLET | Refills: 3 | Status: SHIPPED | OUTPATIENT
Start: 2024-11-08 | End: 2025-11-08

## 2024-11-08 RX ORDER — AMITRIPTYLINE HYDROCHLORIDE 10 MG/1
10 TABLET, FILM COATED ORAL NIGHTLY
Qty: 90 TABLET | Refills: 3 | Status: SHIPPED | OUTPATIENT
Start: 2024-11-08

## 2024-11-08 RX ORDER — OXYBUTYNIN CHLORIDE 15 MG/1
15 TABLET, EXTENDED RELEASE ORAL 2 TIMES DAILY
Qty: 180 TABLET | Refills: 3 | Status: SHIPPED | OUTPATIENT
Start: 2024-11-08 | End: 2024-11-08 | Stop reason: SDUPTHER

## 2024-11-08 RX ORDER — OXYBUTYNIN CHLORIDE 15 MG/1
15 TABLET, EXTENDED RELEASE ORAL 2 TIMES DAILY
Qty: 180 TABLET | Refills: 3 | Status: SHIPPED | OUTPATIENT
Start: 2024-11-08

## 2024-11-08 NOTE — PATIENT INSTRUCTIONS
Please  your Myrbetriq therapy. It has been sent to the pharmacy you requested.     Please continue your amytriptilline. Refills sent to your pharmacy.    Please continue your Oxybutynin therapy, refills sent to your pharmacy,     Call the clinic with questions or concerns.    [phone]

## 2024-11-08 NOTE — PROGRESS NOTES
"Subjective   Patient ID: Kiara Leyva is a 64 y.o. female who presents for No chief complaint on file.     HPI  64-year-old with mild vaginal atrophy, urge urinary incontinence, and mild pelvic floor weakness.        The patient was utilizing her Myrbetriq 50 mg and oxybutynin 15 mg twice daily. However she is unable to get  a refill on this. She is also utilizing 10 mg of amitriptyline for her nocturia complaints. She is overall very satisfied with her dual therapy. Having added the amitriptyline at night, the patient feels \"where I want to be\". She is satisfied with her current therapy and just requires refill on her medication.     She complaints of constipation, take stool softeners. She denies any other bowel related complaints, no fecal or flatal incontinence.     She denies any vaginal complaints, no abnormal vaginal bleeding or discharge.     She has no other complaints.    From Previous note  This visit was performed through telemedicine  64-year-old with mild vaginal atrophy, urge urinary incontinence, and mild pelvic floor weakness.     The patient has been taking Myrbetriq 50 mg and oxybutynin 15 mg twice daily. She is also utilizing 10 mg of amitriptyline for her nocturia complaints. She is overall very satisfied with her dual therapy. Having added the amitriptyline at night, the patient feels \"where I want to be\". She is satisfied with her current therapy and just requires refill on her medication.     She complaints of constipation, take stool softeners. She denies any other bowel related complaints, no fecal or flatal incontinence.     She denies any vaginal complaints, no abnormal vaginal bleeding or discharge.     She has no other complaints.    From Previous note  62-year-old with mild vaginal atrophy, urge urinary incontinence, and mild pelvic floor weakness.     The patient has been taking Myrbetriq 50 mg and oxybutynin 15 mg twice daily. She is also utilizing 10 mg of amitriptyline for her " "nocturia complaints. She is overall very satisfied with her dual therapy. Having added the amitriptyline at night, the patient feels \"where I want to be\". She is satisfied with her current therapy and just requires refill on her medication.     She complaints of constipation, take stool softeners. She denies any other bowel related complaints, no fecal or flatal incontinence.     She denies any vaginal complaints, no abnormal vaginal bleeding or discharge.     She has no other complaints.     From previous note  61-year-old presenting for follow-up with history of urge urinary incontinence.     The patient has been taking Myrbetriq 50 mg and oxybutynin 15 mg twice daily. She is overall very satisfied with her dual therapy. Having added the amitriptyline at night, the patient feels \"where I want to be\". Her primary care recently refilled her oxybutynin therapy but at a 10 mg dose. She is felt that this is inadequate to her complaints.     She wishes her prescription sent into her [facility] pharmacy.     She has no other complaints.     From previous note:  58-year-old presenting as a referral from Dr. Barr with complaints of long-standing urinary urgency and incontinence.     The patient has noted urinary urgency incontinence symptoms for at least the last 20 years. She states that she has utilized oxybutynin for many of these years with moderate improvement in her symptoms. She is now taking 15 mg once daily. She denies any dry mouth or constipation complaints. However she notes daytime frequency, urgency, and urge related incontinence. She wears 2 pads daily because of this daily leaking. She notes nocturia up to 4 times nightly as well as nightly enuresis. She denies any stress urinary incontinence symptoms. She generally feels that she empties her bladder well. She denies a history of nephrolithiasis, chronic urinary tract infections, or gross hematuria.     It appears that she was evaluated by Dr. Parisi " "in 2017 and was counseled to attempt a trial of Myrbetriq therapy. The patient does not believe she started this medication and was lost to follow-up.     The patient is \"somewhat\" sexually active. She denies any dyspareunia. She denies any abnormal vaginal discharge or bleeding. She does have episodic vaginal \"itching\" which she treats with talc. She denies any vaginal bulge symptoms. She denies any fecal or flatal incontinence. She has episodic constipation which she treats through dietary changes     She has no other complaints.     Review of Systems  Constitutional: No fever, No chills and No fatigue.   Eyes: No vision problems and No dryness of the eyes.   ENT: No dry mouth, No hearing loss and No nosebleeds.   Cardiovascular: No chest pain, No palpitations and No orthopnea.   Respiratory: No shortness of breath, No cough and No wheezing.   Gastrointestinal: No abdominal pain, No constipation, No nausea, No diarrhea, No vomiting and No melena.   Genitourinary: As noted in HPI.   Musculoskeletal: No back pain, No myalgias, No muscle weakness, No joint swelling and No leg edema.   Integumentary: No rashes, No skin lesion and No itching.   Neurological: No headache, No numbness and No dizziness.   Psychiatric: No sleep disturbances, No anxiety and No depression.   Endocrine: No hot flashes, No loss of hair and No hirsutism.   Hematologic/Lymphatic: No swollen glands, No tendency for easy bleeding and No tendency for easy bruising.   All other systems have been reviewed and are negative for complaint.        Objective   Physical Exam      Assessment/Plan   64-year-old with mild vaginal atrophy, urge urinary incontinence, and mild pelvic floor weakness.     #1 The patient will continue her dual therapy oxybutynin 15 mg twice daily and Myrbetriq 50 mg daily. A new prescription for the these medications were called into her [facility] pharmacy.  There was confusion regarding her mail order versus the [facility] and " generic that they are on versus Myrbetriq.  It appears that the generic version is only $20 and this was confirmed to have been sent to her [facility] pharmacy.  We again discussed the AUA OAB care pathway. We again discussed PTNS, Botox, and InterStim. The patient is overall quite satisfied and wishes to continue her present medical therapy. She is also had benefits with her amitriptyline at night and wishes to continue this moving forward.  A new prescription was sent into her pharmacy.     #2 the patient will follow-up in 12 months to discuss therapy.      CRISTIANA Gonzalez MD       Scribe Attestation  By signing my name below, IMeg Scribe attest that this documentation has been prepared under the direction and in the presence of Pilo Gonzalez MD. All medical record entries made by the Scribe were at my direction or personally dictated by me. I have reviewed the chart and agree that the record accurately reflects my personal performance of the history, physical exam, discussion and plan.

## 2024-11-18 ENCOUNTER — HOSPITAL ENCOUNTER (OUTPATIENT)
Dept: RADIOLOGY | Facility: CLINIC | Age: 64
Discharge: HOME | End: 2024-11-18
Payer: COMMERCIAL

## 2024-11-18 VITALS — HEIGHT: 65 IN | BODY MASS INDEX: 31.04 KG/M2 | WEIGHT: 186.29 LBS

## 2024-11-18 DIAGNOSIS — Z12.31 ENCOUNTER FOR SCREENING MAMMOGRAM FOR MALIGNANT NEOPLASM OF BREAST: ICD-10-CM

## 2024-11-18 PROCEDURE — 77063 BREAST TOMOSYNTHESIS BI: CPT | Performed by: RADIOLOGY

## 2024-11-18 PROCEDURE — 77067 SCR MAMMO BI INCL CAD: CPT

## 2024-11-18 PROCEDURE — 77067 SCR MAMMO BI INCL CAD: CPT | Performed by: RADIOLOGY

## 2024-12-02 DIAGNOSIS — M25.552 PAIN IN LEFT HIP: ICD-10-CM

## 2024-12-02 DIAGNOSIS — M25.551 PAIN IN RIGHT HIP: ICD-10-CM

## 2024-12-02 DIAGNOSIS — E55.9 VITAMIN D DEFICIENCY, UNSPECIFIED: ICD-10-CM

## 2024-12-02 DIAGNOSIS — K21.9 GASTROESOPHAGEAL REFLUX DISEASE WITHOUT ESOPHAGITIS: ICD-10-CM

## 2024-12-03 RX ORDER — OMEPRAZOLE 40 MG/1
40 CAPSULE, DELAYED RELEASE ORAL
Qty: 90 CAPSULE | Refills: 1 | Status: SHIPPED | OUTPATIENT
Start: 2024-12-03

## 2024-12-03 RX ORDER — DICLOFENAC SODIUM 100 MG/1
100 TABLET, EXTENDED RELEASE ORAL DAILY
Qty: 90 TABLET | Refills: 0 | Status: SHIPPED | OUTPATIENT
Start: 2024-12-03

## 2024-12-03 RX ORDER — ERGOCALCIFEROL 1.25 MG/1
1 CAPSULE ORAL
Qty: 6 CAPSULE | Refills: 1 | Status: SHIPPED | OUTPATIENT
Start: 2024-12-08

## 2024-12-05 ENCOUNTER — TELEPHONE (OUTPATIENT)
Dept: PRIMARY CARE | Facility: CLINIC | Age: 64
End: 2024-12-05
Payer: COMMERCIAL

## 2024-12-05 DIAGNOSIS — R35.1 NOCTURIA: ICD-10-CM

## 2024-12-05 DIAGNOSIS — L29.9 ITCHING: ICD-10-CM

## 2024-12-05 RX ORDER — AMITRIPTYLINE HYDROCHLORIDE 10 MG/1
10 TABLET, FILM COATED ORAL NIGHTLY
Qty: 90 TABLET | Refills: 3 | Status: SHIPPED | OUTPATIENT
Start: 2024-12-05

## 2024-12-05 RX ORDER — AZELASTINE HYDROCHLORIDE 0.5 MG/ML
1 SOLUTION/ DROPS OPHTHALMIC 2 TIMES DAILY PRN
Qty: 6 ML | Refills: 3 | Status: SHIPPED | OUTPATIENT
Start: 2024-12-05

## 2024-12-05 RX ORDER — HYDROXYZINE HYDROCHLORIDE 10 MG/1
25 TABLET, FILM COATED ORAL EVERY 8 HOURS PRN
Qty: 90 TABLET | Refills: 0 | Status: SHIPPED | OUTPATIENT
Start: 2024-12-05

## 2025-01-03 DIAGNOSIS — N39.41 URGE INCONTINENCE: ICD-10-CM

## 2025-01-21 ENCOUNTER — OFFICE VISIT (OUTPATIENT)
Dept: UROLOGY | Facility: CLINIC | Age: 65
End: 2025-01-21
Payer: COMMERCIAL

## 2025-01-21 DIAGNOSIS — N32.81 OVERACTIVE BLADDER: ICD-10-CM

## 2025-01-21 DIAGNOSIS — N39.41 URGE INCONTINENCE: Primary | ICD-10-CM

## 2025-01-21 DIAGNOSIS — R35.1 NOCTURIA: ICD-10-CM

## 2025-01-21 DIAGNOSIS — N20.0 KIDNEY STONES: ICD-10-CM

## 2025-01-21 PROCEDURE — 1036F TOBACCO NON-USER: CPT | Performed by: NURSE PRACTITIONER

## 2025-01-21 PROCEDURE — 1159F MED LIST DOCD IN RCRD: CPT | Performed by: NURSE PRACTITIONER

## 2025-01-21 PROCEDURE — 99213 OFFICE O/P EST LOW 20 MIN: CPT | Performed by: NURSE PRACTITIONER

## 2025-01-21 PROCEDURE — 1160F RVW MEDS BY RX/DR IN RCRD: CPT | Performed by: NURSE PRACTITIONER

## 2025-01-21 PROCEDURE — G2211 COMPLEX E/M VISIT ADD ON: HCPCS | Performed by: NURSE PRACTITIONER

## 2025-01-21 RX ORDER — MIRABEGRON 50 MG/1
50 TABLET, FILM COATED, EXTENDED RELEASE ORAL DAILY
Qty: 90 TABLET | Refills: 3 | Status: SHIPPED | OUTPATIENT
Start: 2025-01-21 | End: 2026-01-21

## 2025-01-21 NOTE — PROGRESS NOTES
[facility]  Outpatient Clinic Note    Subjective   Kiara Leyva is a 64 y.o. female    History of Present Illness   Patient presenting to clinic today for FUV. Last visit with Dr. Gonzalez 11/8/2024  History of mild vaginal atrophy, urge urinary incontinence, and mild pelvic floor weakness. Per Dr. Gonzalez note 11/8/2024 The patient is utilizing Myrbetriq 50 mg and oxybutynin 15 mg twice daily. She is also utilizing 10 mg of amitriptyline for her nocturia complaints. She is overall very satisfied with her dual therapy. Having added the amitriptyline at night, the patient is satisfied with her current therapy      Complaints of constipation, take stool softener and MiraLax as needed    She denies any vaginal complaints, no abnormal vaginal bleeding or discharge.     She received Myrbetriq from [facility] with invoice >$200  Started Generic Mirabegron 50 mg daily in November. Receiving from [email or web address]   Requesting a call to [facility] to discontinue the Myrbetriq.     Declines Urine sample today     Past Medical History and Surgical History   Past Medical History:   Diagnosis Date    Arthritis     Asthma     Carcinoid tumor     noted on colonoscopy, s/p resection    Eczema     GERD (gastroesophageal reflux disease)     Hyperlipidemia     Hypertension     OAB (overactive bladder)     Vitamin D deficiency      Past Surgical History:   Procedure Laterality Date    COLONOSCOPY      HIP ARTHROPLASTY Left 11/29/2023    OTHER SURGICAL HISTORY      carcinoid tumor resection    TOTAL HIP ARTHROPLASTY Right     TUBAL LIGATION  07/07/2016    Tubal Ligation       Medications  Current Outpatient Medications on File Prior to Visit   Medication Sig Dispense Refill    albuterol 90 mcg/actuation inhaler Inhale 1-2 puffs. EVERY 4 TO 6 HOURS AS NEEDED      amitriptyline (Elavil) 10 mg tablet Take 1 tablet (10 mg) by mouth once daily at bedtime. 90 tablet 3    azelastine (Optivar) 0.05 % ophthalmic solution Administer 1 drop  into affected eye(s) 2 times a day as needed (itchy eyes). affected eye 6 mL 3    biotin 5,000 mcg tablet,disintegrating Take 1 tablet by mouth once daily.      bisacodyl (Dulcolax) 10 mg suppository Insert 1 suppository (10 mg) into the rectum once daily as needed.      chlorhexidine (Peridex) 0.12 % solution SWISH AND SPIT 15ML FOR 30 SECONDS NIGHT PRIOR TO SURGERY AND MORNING OF SURGERY 473 mL 0    cholecalciferol (Vitamin D-3) 50 mcg (2,000 unit) capsule Take 1 capsule (50 mcg) by mouth early in the morning.. 90 capsule 0    clobetasol (Temovate) 0.05 % cream APPLY sparingly to affected area Twice daily for 2 weeks, then once daily as needed.      cyanocobalamin (Vitamin B-12) 1,000 mcg tablet Take 1 tablet (1,000 mcg) by mouth once daily.      cyclobenzaprine (Flexeril) 10 mg tablet Take 1 tablet (10 mg) by mouth as needed at bedtime.      diclofenac sodium (Voltaren XR) 100 mg 24 hr tablet TAKE 1 TABLET BY MOUTH EVERY DAY 90 tablet 0    diclofenac sodium (Voltaren) 1 % gel APPLY 4.5 INCHES (4 G) TOPICALLY 2 TIMES A DAY AS NEEDED (JOINT PAIN). APPLY SPARINGLY TO AFFECTED AREA EVERY  g 1    dutasteride (Avodart) 0.5 mg capsule Take by mouth.      ergocalciferol (Vitamin D-2) 1.25 MG (68971 UT) capsule TAKE 1 CAPSULE BY MOUTH ONCE A WEEK 6 capsule 1    fluticasone (Flonase) 50 mcg/actuation nasal spray Administer 1 spray into each nostril once daily as needed.      hydroCHLOROthiazide (HYDRODiuril) 25 mg tablet TAKE 1 TABLET BY MOUTH EVERY DAY 90 tablet 1    hydrOXYzine HCL (Atarax) 10 mg tablet Take 2.5 tablets (25 mg) by mouth every 8 hours if needed for itching. 90 tablet 0    ketorolac (Toradol) 10 mg tablet Take 1 tablet (10 mg) by mouth every 6 hours if needed for moderate pain (4 - 6). 12 tablet 0    LORazepam (Ativan) 0.5 mg tablet Take 1 tablet (0.5 mg) by mouth see administration instructions for 7 days. 1 by mouth 1 hour prior to MRI, may repeat X 1 dose 2 tablet 0    minoxidil (Loniten) 2.5 mg  tablet Take 0.5 tablets (1.25 mg) by mouth once daily. 90 tablet 0    mirabegron (Myrbetriq) 50 mg tablet extended release 24 hr 24 hr tablet Take 1 tablet (50 mg) by mouth once daily. 90 tablet 3    montelukast (Singulair) 10 mg tablet TAKE 1 TABLET BY MOUTH EVERY DAY AS NEEDED FOR SINUS CONGESTION AS DIRECTED 90 tablet 0    Myrbetriq 50 mg tablet extended release 24 hr 24 hr tablet Take 1 tablet (50 mg) by mouth once daily. 90 tablet 3    omeprazole (PriLOSEC) 40 mg DR capsule TAKE 1 CAPSULE (40 MG) BY MOUTH ONCE DAILY IN THE MORNING. TAKE BEFORE MEALS. 90 capsule 1    oxybutynin XL (Ditropan-XL) 15 mg 24 hr tablet Take 1 tablet (15 mg) by mouth 2 times a day. 180 tablet 3    pantoprazole (ProtoNix) 40 mg EC tablet Take 1 tablet (40 mg) by mouth once daily in the morning. Take before meals. Do not crush, chew, or split. 30 tablet 0    simvastatin (Zocor) 20 mg tablet TAKE 1 TABLET BY MOUTH EVERYDAY AT BEDTIME 90 tablet 1    sodium hyaluronate (Gelsyn-3) 16.8 mg/2 mL injection Inject 2 mL (16.8 mg) into the joint 1 (one) time per week. 2 mL 0    sodium hyaluronate (Gelsyn-3) 16.8 mg/2 mL injection Inject 2 mL (16.8 mg) into the joint 1 (one) time per week. 2 mL 0    tobramycin (Tobrex) 0.3 % ophthalmic solution Administer into affected eye(s).      traMADol (Ultram) 50 mg tablet Take 1 tablet (50 mg) by mouth every 8 hours if needed for severe pain (7 - 10). 28 tablet 0    triamcinolone (Kenalog) 0.025 % ointment Apply 1 Application topically 2 times a day. 30 g 1    vibegron (Gemtesa) 75 mg tablet Take by mouth.      vitamin E mixed 100 unit tablet Take by mouth once daily.       No current facility-administered medications on file prior to visit.       Objective   Physicial Exam  General: Well developed, well nourished, alert and cooperative, appears in no acute distress  Eyes: Non-injected conjunctiva, sclera clear, no proptosis  Cardiac: Extremities are warm and well perfused. No edema, cyanosis or pallor.    Lungs: Breathing is easy, non-labored. Speaking in clear and complete sentences. Normal diaphragmatic movement.  MSK: Ambulatory with steady gait, unassisted  Neuro: alert and oriented to person, place and time  Psych: Demonstrates good judgement and reason, without hallucinations, abnormal affect or abnormal behaviors.  Skin: no obvious lesions, no rashes.    No visits with results within 30 Day(s) from this visit.   Latest known visit with results is:   Office Visit on 07/11/2024   Component Date Value Ref Range Status    WBC 07/11/2024 5.2  4.4 - 11.3 x10*3/uL Final    nRBC 07/11/2024 0.0  0.0 - 0.0 /100 WBCs Final    RBC 07/11/2024 4.54  4.00 - 5.20 x10*6/uL Final    Hemoglobin 07/11/2024 13.1  12.0 - 16.0 g/dL Final    Hematocrit 07/11/2024 38.1  36.0 - 46.0 % Final    MCV 07/11/2024 84  80 - 100 fL Final    MCH 07/11/2024 28.9  26.0 - 34.0 pg Final    MCHC 07/11/2024 34.4  32.0 - 36.0 g/dL Final    RDW 07/11/2024 13.3  11.5 - 14.5 % Final    Platelets 07/11/2024 299  150 - 450 x10*3/uL Final    Glucose 07/11/2024 102 (H)  74 - 99 mg/dL Final    Sodium 07/11/2024 140  136 - 145 mmol/L Final    Potassium 07/11/2024 4.0  3.5 - 5.3 mmol/L Final    MILD HEMOLYSIS DETECTED. The result may be falsely elevated due to hemolysis or other interferents. Clinical correlation is recommended. Repeat testing may be considered.    Chloride 07/11/2024 103  98 - 107 mmol/L Final    Bicarbonate 07/11/2024 26  21 - 32 mmol/L Final    Anion Gap 07/11/2024 15  10 - 20 mmol/L Final    Urea Nitrogen 07/11/2024 9  6 - 23 mg/dL Final    Creatinine 07/11/2024 0.48 (L)  0.50 - 1.05 mg/dL Final    eGFR 07/11/2024 >90  >60 mL/min/1.73m*2 Final    Calculations of estimated GFR are performed using the 2021 CKD-EPI Study Refit equation without the race variable for the IDMS-Traceable creatinine methods.  https://jasn.asnjournals.org/content/early/2021/09/22/ASN.0330895472    Calcium 07/11/2024 9.4  8.6 - 10.6 mg/dL Final    Albumin  07/11/2024 4.3  3.4 - 5.0 g/dL Final    Alkaline Phosphatase 07/11/2024 70  33 - 136 U/L Final    Total Protein 07/11/2024 6.6  6.4 - 8.2 g/dL Final    AST 07/11/2024 35  9 - 39 U/L Final    MILD HEMOLYSIS DETECTED. The result may be falsely elevated due to hemolysis or other interferents. Clinical correlation is recommended. Repeat testing may be considered.    Bilirubin, Total 07/11/2024 0.4  0.0 - 1.2 mg/dL Final    ALT 07/11/2024 40  7 - 45 U/L Final    Patients treated with Sulfasalazine may generate falsely decreased results for ALT.    Hemoglobin A1C 07/11/2024 5.2  see below % Final    Hemoglobin variant detected which does not interfere with determination of Hemoglobin A1c. Hemoglobin identification can be ordered to characterize the variant if clinically indicated.    Estimated Average Glucose 07/11/2024 103  Not Established mg/dL Final    Cholesterol 07/11/2024 210 (H)  0 - 199 mg/dL Final          Age      Desirable   Borderline High   High     0-19 Y     0 - 169       170 - 199     >/= 200    20-24 Y     0 - 189       190 - 224     >/= 225         >24 Y     0 - 199       200 - 239     >/= 240   **All ranges are based on fasting samples. Specific   therapeutic targets will vary based on patient-specific   cardiac risk.    Pediatric guidelines reference:Pediatrics 2011, 128(S5).Adult guidelines reference: NCEP ATPIII Guidelines,EMY 2001, 258:2486-97    Venipuncture immediately after or during the administration of Metamizole may lead to falsely low results. Testing should be performed immediately prior to Metamizole dosing.    HDL-Cholesterol 07/11/2024 67.5  mg/dL Final      Age       Very Low   Low     Normal    High    0-19 Y    < 35      < 40     40-45     ----  20-24 Y    ----     < 40      >45      ----        >24 Y      ----     < 40     40-60      >60      Cholesterol/HDL Ratio 07/11/2024 3.1   Final      Ref Values  Desirable  < 3.4  High Risk  > 5.0    LDL Calculated 07/11/2024 115 (H)  <=99  mg/dL Final                                Near   Borderline      AGE      Desirable  Optimal    High     High     Very High     0-19 Y     0 - 109     ---    110-129   >/= 130     ----    20-24 Y     0 - 119     ---    120-159   >/= 160     ----      >24 Y     0 -  99   100-129  130-159   160-189     >/=190      VLDL 07/11/2024 28  0 - 40 mg/dL Final    Triglycerides 07/11/2024 139  0 - 149 mg/dL Final       Age         Desirable   Borderline High   High     Very High   0 D-90 D    19 - 174         ----         ----        ----  91 D- 9 Y     0 -  74        75 -  99     >/= 100      ----    10-19 Y     0 -  89        90 - 129     >/= 130      ----    20-24 Y     0 - 114       115 - 149     >/= 150      ----         >24 Y     0 - 149       150 - 199    200- 499    >/= 500    Venipuncture immediately after or during the administration of Metamizole may lead to falsely low results. Testing should be performed immediately prior to Metamizole dosing.    Non HDL Cholesterol 07/11/2024 143  0 - 149 mg/dL Final          Age       Desirable   Borderline High   High     Very High     0-19 Y     0 - 119       120 - 144     >/= 145    >/= 160    20-24 Y     0 - 149       150 - 189     >/= 190      ----         >24 Y    30 mg/dL above LDL Cholesterol goal      Thyroid Stimulating Hormone 07/11/2024 1.08  0.44 - 3.98 mIU/L Final      Review of Systems  All other systems have been reviewed and are negative for complaint.      Assessment and Plan     Patient presenting to clinic today for FUV. Last visit with Dr. Gonzalez 11/8/2024  History of mild vaginal atrophy, urge urinary incontinence, and mild pelvic floor weakness. Per Dr. Gonzalez note 11/8/2024 The patient is utilizing Myrbetriq 50 mg and oxybutynin 15 mg twice daily. She is also utilizing 10 mg of amitriptyline for her nocturia complaints. She is overall very satisfied with her dual therapy. Having added the amitriptyline at night, the patient is satisfied with her  current therapy      Complaints of constipation, take stool softener and MiraLax as needed    She denies any vaginal complaints, no abnormal vaginal bleeding or discharge.     She received Myrbetriq (Prescribed in Oct 2024) from [facility] with invoice >$200  Started Generic Mirabegron 50 mg daily in November. Receiving from [facility]   Requesting a call to [facility] to discontinue the Myrbetriq which I have done.   Medication list updated. Patient will RTC in 6 months, sooner if needed     Some elements copied from Dr. Gonzalez note on 11/08/2024, the elements have been updated and all reflect current decision making from today, 1/21/2025       All questions and concerns were addressed. Patient verbalizes understanding and has no other questions at this time.   Tomasa Macedo-- JOZEF CANO  Office Phone:  [phone]

## 2025-01-30 RX ORDER — MIRABEGRON 50 MG/1
50 TABLET, FILM COATED, EXTENDED RELEASE ORAL DAILY
Refills: 3 | OUTPATIENT
Start: 2025-01-30

## 2025-03-05 DIAGNOSIS — I10 HYPERTENSION, UNSPECIFIED TYPE: ICD-10-CM

## 2025-03-05 RX ORDER — HYDROCHLOROTHIAZIDE 25 MG/1
25 TABLET ORAL DAILY
Qty: 90 TABLET | Refills: 1 | OUTPATIENT
Start: 2025-03-05

## 2025-04-14 ENCOUNTER — TELEPHONE (OUTPATIENT)
Dept: PRIMARY CARE | Facility: CLINIC | Age: 65
End: 2025-04-14
Payer: COMMERCIAL

## 2025-04-14 ENCOUNTER — OFFICE VISIT (OUTPATIENT)
Dept: URGENT CARE | Age: 65
End: 2025-04-14
Payer: COMMERCIAL

## 2025-04-14 VITALS
RESPIRATION RATE: 18 BRPM | HEIGHT: 65 IN | WEIGHT: 193 LBS | SYSTOLIC BLOOD PRESSURE: 137 MMHG | HEART RATE: 112 BPM | OXYGEN SATURATION: 99 % | DIASTOLIC BLOOD PRESSURE: 87 MMHG | BODY MASS INDEX: 32.15 KG/M2 | TEMPERATURE: 98.8 F

## 2025-04-14 DIAGNOSIS — R07.9 CHEST PAIN, UNSPECIFIED TYPE: Primary | ICD-10-CM

## 2025-04-14 PROCEDURE — 3079F DIAST BP 80-89 MM HG: CPT | Performed by: PHYSICIAN ASSISTANT

## 2025-04-14 PROCEDURE — 3008F BODY MASS INDEX DOCD: CPT | Performed by: PHYSICIAN ASSISTANT

## 2025-04-14 PROCEDURE — 99215 OFFICE O/P EST HI 40 MIN: CPT | Performed by: PHYSICIAN ASSISTANT

## 2025-04-14 PROCEDURE — 1159F MED LIST DOCD IN RCRD: CPT | Performed by: PHYSICIAN ASSISTANT

## 2025-04-14 PROCEDURE — 3075F SYST BP GE 130 - 139MM HG: CPT | Performed by: PHYSICIAN ASSISTANT

## 2025-04-14 NOTE — PATIENT INSTRUCTIONS
"I feel your symptoms are concerning for potential underlying acute cardiopulmonary processes given your complaints of intermittent severe chest heaviness that you described to me as \"like I am having a heart attack.\"  "

## 2025-04-14 NOTE — PROGRESS NOTES
"Subjective   Patient ID: Kiara Leyva is a 64 y.o. female. They present today with a chief complaint of Nasal Congestion (CHEST/).    History of Present Illness  : Patient is a 64-year-old -American female, past medical history of hypertension, hyperlipidemia, presenting to the clinic for chief complaint of chest heaviness.  Patient is reporting approximately 4 to 5-day history of intermittent chest heaviness in which the patient self describes as \"feels like I am having a heart attack.\"  She states sipping some water helps alleviate her pain.  She is reporting extensive history of similar symptoms about 15 years ago in which she was worked up for some kind of esophageal dysmotility.  States she was worked up from a cardiac perspective at that time again about 15 years ago.  She feels this is definitely not related to her heart.  She does report some mild shortness of breath associated with her symptoms.  No nausea or vomiting or diarrhea.  No lightheadedness or dizziness.  She denies any upper respiratory congestion rhinorrhea cough or sputum production.          Past Medical History  Allergies as of 04/14/2025 - Reviewed 04/14/2025   Allergen Reaction Noted    Pollen extracts Other 06/13/2018       (Not in a hospital admission)         Past Medical History:   Diagnosis Date    Arthritis     Asthma     Carcinoid tumor     noted on colonoscopy, s/p resection    Eczema     GERD (gastroesophageal reflux disease)     Hyperlipidemia     Hypertension     OAB (overactive bladder)     Vitamin D deficiency        Past Surgical History:   Procedure Laterality Date    COLONOSCOPY      HIP ARTHROPLASTY Left 11/29/2023    OTHER SURGICAL HISTORY      carcinoid tumor resection    TOTAL HIP ARTHROPLASTY Right     TUBAL LIGATION  07/07/2016    Tubal Ligation        reports that she quit smoking about 13 years ago. Her smoking use included cigarettes. She started smoking about 38 years ago. She has a 25 pack-year smoking " "history. She has never used smokeless tobacco. She reports current alcohol use of about 1.0 standard drink of alcohol per week. She reports that she does not use drugs.    Review of Systems  Review of Systems                               Objective    Vitals:    04/14/25 1809   BP: 137/87   Pulse: (!) 112   Resp: 18   Temp: 37.1 °C (98.8 °F)   TempSrc: Oral   SpO2: 99%   Weight: 87.5 kg (193 lb)   Height: 1.651 m (5' 5\")     No LMP recorded. Patient is postmenopausal.    Physical Exam  Constitutional: Alert, oriented, cooperative, in no acute distress. Appears well nourished and well hydrated.    Head: Normocephalic, atraumatic    Skin: Intact, dry skin. No lesions, rash, petechiae, or purpura.    Eyes: PERRL, EOMs intact, conjunctiva pink with no erythema or exudates. No scleral icterus. Eyelids without lesions.    ENT: Hearing grossly intact. No external deformities. Tympanic membranes intact with visible landmarks. Nares patent, mucus membranes moist. Dentition without lesions. Pharynx clear, uvula midline.    Neck: Trachea midline, no lymphadenopathy. No meningismus.     Pulmonary: Lungs clear to auscultation bilaterally with good chest wall excursion. No rales, rhonchi, or wheezing. No accessory muscle use or stridor.     Cardiac: Regular rate and rhythm. Normal S1 and S2 with no murmur, rub, or gallop. No JVD, carotids without bruits. 2+ DP and PT pulses.     Abdomen: Soft, nontender, normoactive bowel sounds. No palpable organomegaly or mass. No rebound or guarding. No CVA tenderness.     Genitourinary: Exam deferred.    Musculoskeletal: Full range of motion in extremities. No pain, edema, or deformities. Peripheral pulses full and equal. No cyanosis, or clubbing.     Neurological: Cranial nerves II through XII are grossly intact. Normal sensation, no weakness, no focal findings identified.     Psychiatric: Appropriate mood and affect. Calm.  Procedures    Point of Care Test & Imaging Results from this " visit    Imaging  No results found.    Cardiology, Vascular, and Other Imaging  No other imaging results found for the past 2 days      Diagnostic study results (if any) were reviewed by Aeln Monroe PA-C.    Assessment/Plan   Allergies, medications, history, and pertinent labs/EKGs/Imaging reviewed by Alen Monroe PA-C.     Medical Decision Making  atient was seen eval in the clinic for chief complaint of intermittent chest discomfort.  On exam patient is nontoxic-appearing [place] comfortably no acute distress.  Vital signs are significant for slightly elevated heart rate of 112 bpm, stable otherwise.  Chest is clear, heart is regular, belly is diffusely soft and nontender.  I had a very sensitive conversation with the patient regarding her symptoms and I do feel she warrants further evaluation in the emergency department to assess and rule out underlying acute cardiopulmonary abnormalities given her tachycardia.  She insist that this is her underlying issues she has been experiencing for 15 years.  However, per the patient she has not had any cardiac workup performed for the past 15 years and I do feel it is warranted at this time.  I reviewed my impression, plan, and recent report to the ED and she expresses understanding and agreement this plan.  Patient's case was discussed supervising physician.    Orders and Diagnoses  Diagnoses and all orders for this visit:  Chest pain, unspecified type        Medical Admin Record      Follow Up Instructions  No follow-ups on file.    Patient disposition: ED    Electronically signed by Alen Monroe PA-C  7:03 PM

## 2025-04-14 NOTE — TELEPHONE ENCOUNTER
Pt complaining of cough with chest phlegm requesting antibiotic you lgave her before. Pt will to make an appt can she be scheduled with you sooner than 5/18?

## 2025-04-16 ENCOUNTER — APPOINTMENT (OUTPATIENT)
Dept: PRIMARY CARE | Facility: CLINIC | Age: 65
End: 2025-04-16
Payer: COMMERCIAL

## 2025-04-22 DIAGNOSIS — L30.9 ECZEMA, UNSPECIFIED TYPE: ICD-10-CM

## 2025-04-22 RX ORDER — TRIAMCINOLONE ACETONIDE 0.25 MG/G
OINTMENT TOPICAL 2 TIMES DAILY
Qty: 30 G | Refills: 1 | Status: SHIPPED | OUTPATIENT
Start: 2025-04-22

## 2025-04-23 ENCOUNTER — APPOINTMENT (OUTPATIENT)
Dept: PRIMARY CARE | Facility: CLINIC | Age: 65
End: 2025-04-23
Payer: COMMERCIAL

## 2025-04-23 DIAGNOSIS — I10 HYPERTENSION, UNSPECIFIED TYPE: ICD-10-CM

## 2025-04-23 RX ORDER — HYDROCHLOROTHIAZIDE 25 MG/1
25 TABLET ORAL DAILY
Qty: 90 TABLET | Refills: 1 | OUTPATIENT
Start: 2025-04-23

## 2025-05-08 ENCOUNTER — APPOINTMENT (OUTPATIENT)
Dept: PRIMARY CARE | Facility: CLINIC | Age: 65
End: 2025-05-08
Payer: COMMERCIAL

## 2025-05-14 ENCOUNTER — APPOINTMENT (OUTPATIENT)
Dept: PRIMARY CARE | Facility: CLINIC | Age: 65
End: 2025-05-14
Payer: COMMERCIAL

## 2025-05-21 DIAGNOSIS — N32.81 OVERACTIVE BLADDER: ICD-10-CM

## 2025-05-21 RX ORDER — OXYBUTYNIN CHLORIDE 15 MG/1
15 TABLET, EXTENDED RELEASE ORAL 2 TIMES DAILY
Qty: 180 TABLET | Refills: 1 | Status: SHIPPED | OUTPATIENT
Start: 2025-05-21

## 2025-05-27 ENCOUNTER — APPOINTMENT (OUTPATIENT)
Dept: PRIMARY CARE | Facility: CLINIC | Age: 65
End: 2025-05-27
Payer: COMMERCIAL

## 2025-06-25 ENCOUNTER — APPOINTMENT (OUTPATIENT)
Dept: PRIMARY CARE | Facility: CLINIC | Age: 65
End: 2025-06-25
Payer: COMMERCIAL

## 2025-06-25 VITALS
DIASTOLIC BLOOD PRESSURE: 71 MMHG | HEIGHT: 65 IN | HEART RATE: 99 BPM | BODY MASS INDEX: 31.75 KG/M2 | WEIGHT: 190.6 LBS | OXYGEN SATURATION: 92 % | SYSTOLIC BLOOD PRESSURE: 117 MMHG

## 2025-06-25 DIAGNOSIS — L29.9 ITCHING: ICD-10-CM

## 2025-06-25 DIAGNOSIS — E78.5 HYPERLIPIDEMIA, UNSPECIFIED: ICD-10-CM

## 2025-06-25 DIAGNOSIS — T14.8XXA MUSCLE STRAIN: ICD-10-CM

## 2025-06-25 DIAGNOSIS — Z12.11 SCREENING FOR COLORECTAL CANCER: ICD-10-CM

## 2025-06-25 DIAGNOSIS — Z00.00 WELL ADULT EXAM: Primary | ICD-10-CM

## 2025-06-25 DIAGNOSIS — R07.89 OTHER CHEST PAIN: ICD-10-CM

## 2025-06-25 DIAGNOSIS — Z12.31 SCREENING MAMMOGRAM FOR BREAST CANCER: ICD-10-CM

## 2025-06-25 DIAGNOSIS — H10.13 ATOPIC CONJUNCTIVITIS OF BOTH EYES: ICD-10-CM

## 2025-06-25 DIAGNOSIS — R35.1 NOCTURIA: ICD-10-CM

## 2025-06-25 DIAGNOSIS — J45.20 MILD INTERMITTENT ASTHMA WITHOUT COMPLICATION (HHS-HCC): ICD-10-CM

## 2025-06-25 DIAGNOSIS — Z13.29 SCREENING FOR THYROID DISORDER: ICD-10-CM

## 2025-06-25 DIAGNOSIS — E55.9 VITAMIN D DEFICIENCY: ICD-10-CM

## 2025-06-25 DIAGNOSIS — Z12.12 SCREENING FOR COLORECTAL CANCER: ICD-10-CM

## 2025-06-25 DIAGNOSIS — I10 HYPERTENSION, UNSPECIFIED TYPE: ICD-10-CM

## 2025-06-25 PROCEDURE — 3074F SYST BP LT 130 MM HG: CPT | Performed by: FAMILY MEDICINE

## 2025-06-25 PROCEDURE — 3078F DIAST BP <80 MM HG: CPT | Performed by: FAMILY MEDICINE

## 2025-06-25 PROCEDURE — 1036F TOBACCO NON-USER: CPT | Performed by: FAMILY MEDICINE

## 2025-06-25 PROCEDURE — 3008F BODY MASS INDEX DOCD: CPT | Performed by: FAMILY MEDICINE

## 2025-06-25 PROCEDURE — 1159F MED LIST DOCD IN RCRD: CPT | Performed by: FAMILY MEDICINE

## 2025-06-25 PROCEDURE — 99397 PER PM REEVAL EST PAT 65+ YR: CPT | Performed by: FAMILY MEDICINE

## 2025-06-25 RX ORDER — ALBUTEROL SULFATE 90 UG/1
1-2 INHALANT RESPIRATORY (INHALATION) EVERY 6 HOURS PRN
Qty: 18 G | Refills: 11 | Status: SHIPPED | OUTPATIENT
Start: 2025-06-25

## 2025-06-25 RX ORDER — HYDROCHLOROTHIAZIDE 25 MG/1
25 TABLET ORAL DAILY
Qty: 90 TABLET | Refills: 3 | Status: SHIPPED | OUTPATIENT
Start: 2025-06-25

## 2025-06-25 RX ORDER — ACETAMINOPHEN 500 MG
50 TABLET ORAL DAILY
Qty: 90 CAPSULE | Refills: 3 | Status: SHIPPED | OUTPATIENT
Start: 2025-06-25

## 2025-06-25 RX ORDER — SIMVASTATIN 20 MG/1
20 TABLET, FILM COATED ORAL NIGHTLY
Qty: 90 TABLET | Refills: 3 | Status: SHIPPED | OUTPATIENT
Start: 2025-06-25

## 2025-06-25 RX ORDER — HYDROXYZINE HYDROCHLORIDE 10 MG/1
25 TABLET, FILM COATED ORAL EVERY 8 HOURS PRN
Qty: 90 TABLET | Refills: 0 | Status: SHIPPED | OUTPATIENT
Start: 2025-06-25

## 2025-06-25 RX ORDER — AZELASTINE HYDROCHLORIDE 0.5 MG/ML
1 SOLUTION/ DROPS OPHTHALMIC 2 TIMES DAILY PRN
Qty: 6 ML | Refills: 3 | Status: SHIPPED | OUTPATIENT
Start: 2025-06-25

## 2025-06-25 RX ORDER — AMITRIPTYLINE HYDROCHLORIDE 10 MG/1
10 TABLET, FILM COATED ORAL NIGHTLY
Qty: 90 TABLET | Refills: 3 | Status: SHIPPED | OUTPATIENT
Start: 2025-06-25

## 2025-06-25 RX ORDER — CYCLOBENZAPRINE HCL 10 MG
10 TABLET ORAL 3 TIMES DAILY PRN
Qty: 30 TABLET | Refills: 1 | Status: SHIPPED | OUTPATIENT
Start: 2025-06-25

## 2025-06-25 NOTE — PROGRESS NOTES
Pt here for cpe and discuss pain issues             Chief Complaint:  No chief complaint on file.  History Of Present Illness:   Kiara Leyva       7/11/24 last appt.    6/25/25:   - chest pain April 2025 urgent care visit, pt didn't go to ER. Cardiology referral. Improved with drinking water.   - pt would like eyedrop refill  - pt would like flexeril refill, hydroxyzine refill. Zocor refill.       Left knee- injection with me didn't last, went to ortho and injection didn't last. Scheduled for gel injections with ortho. June 2025 update: gel shot worked.               Healthcare team:  - Carlos- urogyn  - urology- Jan 2025 appt, has July 2025 follow up.  - Marcel- ortho b/l hip replacements  - cardiology referral           PMH: acid reflux, osteoarthritis of the hip and of the knee, carcinoid tumor found on a colonoscopy that was resected and a normal colonoscopy afterwards in 2017, hypertension, eczema, overactive bladder. History of b/l hip replacements.      Left knee- swollen. more bothersome recently. is on diclofenac. March 2023 update: thinking about aquatic therapy. April 11, 2023 update: she went to therapy one time  June 23, 2023- agreed on left knee injection.   Nov 2023- got a left knee injection from ortho.   April 4, 2024- agreed on left knee injection. Off and on, but walking makes it worse. Location- anterior knee, unsure swelling. We discussed another Dr. Rod referral specifically for her knee.      She also saw orthopedic surgery for her bilateral hips and was diagnosed with osteoarthritis.  - right hip replacement Dec 2020.  - she may have TKA in the future.   - Left hip total hip replacement Nov 2023.   - has gel injections for left knee scheduled in Aug 2024.          HTN- pt not taking hctz. well controlled today in the office.      GERD- omeprazole.      Overactive bladder: She did see urogynecology. Seen Jan 2023.      GI specialist- through [facility]- she has routine follow up.    Colonoscopy Nov 11, 2020- reportedly looks good and follow up in 5 years. July 2022 update: she states she saw GI about a year ago and is doing well.        Stress- she lost her mom June 13, 2020. she's an only child. she is sad about it. March 2023 update: carvalho, but sleeping ok.      Past surgical history: Carcinoid tumor resection, tubal ligation. B/l hip replacement.      Allergies: No known drug allergies.        Social history: Quit cigarette smoking in 2012, 2-3 drinks of alcohol week. Denies illicit drug use.   She works at a [employer].      Declines flu shot.      Mammogram: 11/24.     COVID: vaccinated, no booster.       ALL: NKDA     Flu shot:  - declines     Immunizations:   - utd per pt.     Colonoscopy: ordered June 2025.      GYN: has a GYN.                       Review of Systems:     Negative  Constitutional:   - fever   - chills   - night sweats  - unexpected weight change  - changes in sleep     Eyes:   - loss of vision  - double vision  - floaters              Respiratory:   - shortness of breath  - difficulty breathing  - wheezing     Neurological:   - loss of consciousness  - tremors  - dizzy spells  - numbness   - tingling     Gastrointestinal:   - abdominal pain  - nausea  - vomiting  - constipation  - diarrhea  - bloody stools  - loss of bowel control           Genitourinary:   - urinary incontinence  - increased urinary frequency  - painful urination  - blood in urine     Skin:   - Rash  - lumps or bumps  - worrisome moles     Endocrine:   - excessive thirst  - feeling too hot  - feeling too cold  - feeling tired  - fatigue      Hematologic/Lymphatic:   - swollen glands  - blood clotting problems  - easy bruising.     Psychological:   - feelings of depression  - feelings of anxiety.          Positive  Psychological:   - feeling generally happy  - feeling safe at home         Vitals:    06/25/25 1108   BP: 117/71   Pulse: 99   SpO2: 92%            Past Medical History:  She has a past  medical history of Arthritis, Asthma, Carcinoid tumor, Eczema, GERD (gastroesophageal reflux disease), Hyperlipidemia, Hypertension, OAB (overactive bladder), and Vitamin D deficiency.     Past Surgical History:  She has a past surgical history that includes Tubal ligation (07/07/2016); Other surgical history; Colonoscopy; Total hip arthroplasty (Right); and Hip Arthroplasty (Left, 11/29/2023).     Social History:  She reports that she quit smoking about 12 years ago. Her smoking use included cigarettes. She has a 25.00 pack-year smoking history. She has never used smokeless tobacco. She reports current alcohol use of about 1.0 standard drink of alcohol per week. She reports that she does not use drugs.     Family History:  Family History   Problem Relation Name Age of Onset    Coronary artery disease Mother          CABG    Other (homicicde) Father      Hypertension Father      Breast cancer Mother's Sister      Colon cancer Mother's Brother      Ovarian cysts Maternal Cousin       Allergies:  Patient has no known allergies.     Outpatient Medications:  Current Outpatient Medications   Medication Instructions    albuterol 90 mcg/actuation inhaler 1-2 puffs, inhalation, EVERY 4 TO 6 HOURS AS NEEDED<BR>    amitriptyline (ELAVIL) 10 mg, oral, Nightly    amoxicillin (Amoxil) 500 mg capsule TAKE 4 CAPSULES BY MOUTH 1 HOUR BEFORE DENTAL APPOINTMENT<BR>    azelastine (Optivar) 0.05 % ophthalmic solution 1 drop, ophthalmic (eye), 2 times daily PRN, affected eye    biotin 5,000 mcg tablet,disintegrating 1 tablet, oral, Daily    chlorhexidine (Peridex) 0.12 % solution SWISH AND SPIT 15ML FOR 30 SECONDS NIGHT PRIOR TO SURGERY AND MORNING OF SURGERY    cholecalciferol (Vitamin D-3) 50 mcg (2,000 unit) capsule 1 capsule, oral, Daily    clobetasol (Temovate) 0.05 % cream APPLY sparingly to affected area Twice daily for 2 weeks, then once daily as needed.<BR>    cyanocobalamin (Vitamin B-12) 1,000 mcg tablet 1 tablet, oral, Daily     cyclobenzaprine (Flexeril) 10 mg tablet 1 tablet, oral, Nightly PRN    diclofenac sodium (VOLTAREN XR) 100 mg, oral, Daily    diclofenac sodium (VOLTAREN) 4 g, Topical, 2 times daily PRN, APPLY SPARINGLY TO AFFECTED AREA EVERY DAY    ergocalciferol (VITAMIN D-2) 1,250 mcg, oral, Weekly    fluticasone (Flonase) 50 mcg/actuation nasal spray 1 spray, Each Nostril, Daily PRN    hydroCHLOROthiazide (HYDRODiuril) 25 mg tablet 1 tablet, oral, Daily    ketorolac (TORADOL) 10 mg, oral, Every 6 hours PRN    minoxidil (Loniten) 2.5 mg tablet 0.5 tablets, oral, Daily    montelukast (Singulair) 10 mg tablet 1 tablet, oral, Daily PRN, As directed    Myrbetriq 50 mg, oral, Daily    oxybutynin XL (DITROPAN-XL) 15 mg, oral, 2 times daily    pantoprazole (PROTONIX) 40 mg, oral, Daily before breakfast, Do not crush, chew, or split.    simvastatin (Zocor) 20 mg tablet         Physical Exam:  GENERAL: Well developed, well nourished, alert and cooperative, and appears to be in no acute distress.     PSYCH: mood pleasant and appropriate     HEAD: normocephalic     EYES: PERRL, EOMI. vision is grossly intact.        NOSE:  Nares patent b/l.   No bleeding nasal polyps. No nasal discharge.     THROAT:    Oropharynx clear.  No inflammation, swelling, exudate, or lesions.        NECK: Neck supple, non-tender.   No masses, no thyromegaly.  No anterior cervical lymphadenopathy.     CARDIAC:   RRR.         LUNGS: Good respiratory effort.  Clear to auscultation b/l without rales, rhonchi, wheezing.     ABD: soft, nontender       GAIT: steady            SKIN: Skin normal color  no lesions or eruptions.      Last Labs:  CBC -  Lab Results   Component Value Date    WBC 7.0 11/30/2023    HGB 10.3 (L) 11/30/2023    HCT 29.7 (L) 11/30/2023    MCV 83 11/30/2023     11/30/2023        CMP -  Lab Results   Component Value Date    CALCIUM 7.9 (L) 11/30/2023    PROT 6.9 04/07/2023    ALBUMIN 4.2 04/07/2023    AST 17 04/07/2023    ALT 13 04/07/2023     ALKPHOS 73 04/07/2023    BILITOT 0.7 04/07/2023        LIPID PANEL -  Lab Results   Component Value Date    CHOL 225 (H) 04/07/2023    TRIG 127 04/07/2023    HDL 65.1 04/07/2023    CHHDL 3.5 04/07/2023    LDLF 135 (H) 04/07/2023    VLDL 25 04/07/2023           Lab Results   Component Value Date    HGBA1C 5.5 04/07/2023           Continue with ortho team and your other healthcare specialists as needed.      Referral to cardiology.         Fasting lab work was ordered today. It is likely that your insurance will cover the testing, but if you have any concerns- please check with your insurance company before getting the blood work drawn. I will call you when I get the results.   Please do not eat for 12 hours before getting your blood work drawn (water is ok).     Mammogram ordered.     Colonoscopy ordered.      Regarding referrals, you can call the following phone number to attempt to schedule: [phone].  Another potential phone number is: [phone] ([phone]).  The number for pediatric referrals is: [phone].    Refills done today      Agreed today with pt she will establish with a new PCP         Nicholas Barr, DO

## 2025-07-09 ENCOUNTER — OFFICE VISIT (OUTPATIENT)
Dept: CARDIOLOGY | Facility: CLINIC | Age: 65
End: 2025-07-09
Payer: COMMERCIAL

## 2025-07-09 VITALS
HEIGHT: 66 IN | SYSTOLIC BLOOD PRESSURE: 114 MMHG | HEART RATE: 89 BPM | DIASTOLIC BLOOD PRESSURE: 74 MMHG | WEIGHT: 190.6 LBS | BODY MASS INDEX: 30.63 KG/M2 | OXYGEN SATURATION: 94 %

## 2025-07-09 DIAGNOSIS — R00.2 PALPITATIONS: Primary | ICD-10-CM

## 2025-07-09 DIAGNOSIS — R07.89 OTHER CHEST PAIN: ICD-10-CM

## 2025-07-09 PROCEDURE — 1160F RVW MEDS BY RX/DR IN RCRD: CPT | Performed by: NURSE PRACTITIONER

## 2025-07-09 PROCEDURE — 93010 ELECTROCARDIOGRAM REPORT: CPT | Performed by: INTERNAL MEDICINE

## 2025-07-09 PROCEDURE — 3074F SYST BP LT 130 MM HG: CPT | Performed by: NURSE PRACTITIONER

## 2025-07-09 PROCEDURE — 3008F BODY MASS INDEX DOCD: CPT | Performed by: NURSE PRACTITIONER

## 2025-07-09 PROCEDURE — 99212 OFFICE O/P EST SF 10 MIN: CPT

## 2025-07-09 PROCEDURE — 1126F AMNT PAIN NOTED NONE PRSNT: CPT | Performed by: NURSE PRACTITIONER

## 2025-07-09 PROCEDURE — 1159F MED LIST DOCD IN RCRD: CPT | Performed by: NURSE PRACTITIONER

## 2025-07-09 PROCEDURE — 1036F TOBACCO NON-USER: CPT | Performed by: NURSE PRACTITIONER

## 2025-07-09 PROCEDURE — 93005 ELECTROCARDIOGRAM TRACING: CPT | Performed by: NURSE PRACTITIONER

## 2025-07-09 PROCEDURE — 99204 OFFICE O/P NEW MOD 45 MIN: CPT | Performed by: NURSE PRACTITIONER

## 2025-07-09 PROCEDURE — 3078F DIAST BP <80 MM HG: CPT | Performed by: NURSE PRACTITIONER

## 2025-07-09 ASSESSMENT — ENCOUNTER SYMPTOMS
RESPIRATORY NEGATIVE: 1
GASTROINTESTINAL NEGATIVE: 1
CARDIOVASCULAR NEGATIVE: 1
OCCASIONAL FEELINGS OF UNSTEADINESS: 0
LOSS OF SENSATION IN FEET: 0
CONSTITUTIONAL NEGATIVE: 1
NEUROLOGICAL NEGATIVE: 1
DEPRESSION: 0
MUSCULOSKELETAL NEGATIVE: 1

## 2025-07-09 ASSESSMENT — PAIN SCALES - GENERAL: PAINLEVEL_OUTOF10: 0-NO PAIN

## 2025-07-09 NOTE — PROGRESS NOTES
"Chief Complaint:   New Patient Visit chest pain    History Of Present Illness:    .Ms Leyav is here in consult.  She has a greater than 10 year history of \"chest heaviness\" relieved with drinking water.  Unable to qualify if could be palpitations. Denies sob or pedal edema.  Denies lightheadedness, dizziness, presyncope or syncope.  Has a family hx of mother having had CABG.  Denies DM or HTN.  Is treated for HPL.  Quit smoking 13 years ago.  ECG done today shows NSR.          Last Recorded Vitals:  Blood pressure 114/74, pulse 89, height 1.664 m (5' 5.5\"), weight 86.5 kg (190 lb 9.6 oz), SpO2 94%.     Past Medical History:  Medical History[1]     Past Surgical History:  Surgical History[2]    Social History:  Social History[3]    Family History:  Family History[4]      Allergies:  Pollen extracts    Outpatient Medications:  Current Medications[5]     Physical Exam:  Cardiovascular:      PMI at left midclavicular line. Normal rate. Regular rhythm. Normal S1. Normal S2.       Murmurs: There is no murmur.      No gallop.  No click. No rub.   Pulses:     Intact distal pulses.   Edema:     Peripheral edema absent.         ROS:  Review of Systems   Constitutional: Negative.   Cardiovascular: Negative.    Respiratory: Negative.     Skin: Negative.    Musculoskeletal: Negative.    Gastrointestinal: Negative.    Genitourinary: Negative.    Neurological: Negative.           Last Labs: Labs ordered by pcp  CBC -  Lab Results   Component Value Date    WBC 5.2 07/11/2024    HGB 13.1 07/11/2024    HCT 38.1 07/11/2024    MCV 84 07/11/2024     07/11/2024       CMP -  Lab Results   Component Value Date    CALCIUM 9.4 07/11/2024    PROT 6.6 07/11/2024    ALBUMIN 4.3 07/11/2024    AST 35 07/11/2024    ALT 40 07/11/2024    ALKPHOS 70 07/11/2024    BILITOT 0.4 07/11/2024       LIPID PANEL -   Lab Results   Component Value Date    CHOL 210 (H) 07/11/2024    TRIG 139 07/11/2024    HDL 67.5 07/11/2024    CHHDL 3.1 07/11/2024    " LDLF 135 (H) 2023    VLDL 28 2024    NHDL 143 2024       RENAL FUNCTION PANEL -   Lab Results   Component Value Date    GLUCOSE 102 (H) 2024     2024    K 4.0 2024     2024    CO2 26 2024    ANIONGAP 15 2024    BUN 9 2024    CREATININE 0.48 (L) 2024    CALCIUM 9.4 2024    ALBUMIN 4.3 2024        Lab Results   Component Value Date    HGBA1C 5.2 2024         Assessment/Plan   Problem List Items Addressed This Visit    None  Visit Diagnoses         Other chest pain                 ? CAD.  Will have her obtain coronary artery calcium score.    Family hx CAD with mother having had CABG.   HPL. Currently on simvastatin 20 mg.  Will obtain fasting labs tomorrow.  Former smoking.   5.  Chest pain vs palpitations.  ECG done today shows NSR.  She would like to defer monitor, but agreeable to echo.  Chanda Cruz, APRN-CNP         [1]   Past Medical History:  Diagnosis Date    Arthritis     Asthma     Carcinoid tumor     noted on colonoscopy, s/p resection    Eczema     GERD (gastroesophageal reflux disease)     Hyperlipidemia     Hypertension     OAB (overactive bladder)     Vitamin D deficiency    [2]   Past Surgical History:  Procedure Laterality Date    COLONOSCOPY      HIP ARTHROPLASTY Left 2023    OTHER SURGICAL HISTORY      carcinoid tumor resection    TOTAL HIP ARTHROPLASTY Right     TUBAL LIGATION  2016    Tubal Ligation   [3]   Social History  Socioeconomic History    Marital status: Single   Tobacco Use    Smoking status: Former     Current packs/day: 0.00     Average packs/day: 1 pack/day for 25.0 years (25.0 ttl pk-yrs)     Types: Cigarettes     Start date:      Quit date:      Years since quittin.5    Smokeless tobacco: Never   Substance and Sexual Activity    Alcohol use: Yes     Alcohol/week: 1.0 standard drink of alcohol     Types: 1 Standard drinks or equivalent per week    Drug use:  Never     Social Drivers of Health     Financial Resource Strain: Low Risk  (11/29/2023)    Overall Financial Resource Strain (CARDIA)     Difficulty of Paying Living Expenses: Not hard at all   Food Insecurity: No Food Insecurity (11/25/2023)    Hunger Vital Sign     Worried About Running Out of Food in the Last Year: Never true     Ran Out of Food in the Last Year: Never true   Transportation Needs: No Transportation Needs (12/22/2023)    OASIS : Transportation     Lack of Transportation (Medical): No     Lack of Transportation (Non-Medical): No     Patient Unable or Declines to Respond: No   Physical Activity: Inactive (11/25/2023)    Exercise Vital Sign     Days of Exercise per Week: 0 days     Minutes of Exercise per Session: 0 min   Social Connections: Feeling Socially Integrated (12/22/2023)    OASIS : Social Isolation     Frequency of experiencing loneliness or isolation: Never   Intimate Partner Violence: Not At Risk (11/25/2023)    Humiliation, Afraid, Rape, and Kick questionnaire     Fear of Current or Ex-Partner: No     Emotionally Abused: No     Physically Abused: No     Sexually Abused: No   Housing Stability: Low Risk  (11/29/2023)    Housing Stability Vital Sign     Unable to Pay for Housing in the Last Year: No     Number of Places Lived in the Last Year: 1     Unstable Housing in the Last Year: No   [4]   Family History  Problem Relation Name Age of Onset    Coronary artery disease Mother          CABG    Other (homicicde) Father      Hypertension Father      Breast cancer Mother's Sister      Colon cancer Mother's Brother      Ovarian cysts Maternal Cousin     [5]   Current Outpatient Medications   Medication Sig Dispense Refill    albuterol 90 mcg/actuation inhaler Inhale 1-2 puffs every 6 hours if needed for wheezing. EVERY 4 TO 6 HOURS AS NEEDED 18 g 11    amitriptyline (Elavil) 10 mg tablet Take 1 tablet (10 mg) by mouth once daily at bedtime. 90 tablet 3    azelastine (Optivar) 0.05  % ophthalmic solution Administer 1 drop into affected eye(s) 2 times a day as needed (itchy eyes). affected eye 6 mL 3    cholecalciferol (Vitamin D-3) 50 mcg (2,000 units) capsule Take 1 capsule (50 mcg) by mouth early in the morning.. 90 capsule 3    clobetasol (Temovate) 0.05 % cream APPLY sparingly to affected area Twice daily for 2 weeks, then once daily as needed.      cyclobenzaprine (Flexeril) 10 mg tablet Take 1 tablet (10 mg) by mouth 3 times a day as needed for muscle spasms. 30 tablet 1    diclofenac sodium (Voltaren XR) 100 mg 24 hr tablet TAKE 1 TABLET BY MOUTH EVERY DAY 90 tablet 0    diclofenac sodium (Voltaren) 1 % gel APPLY 4.5 INCHES (4 G) TOPICALLY 2 TIMES A DAY AS NEEDED (JOINT PAIN). APPLY SPARINGLY TO AFFECTED AREA EVERY  g 1    ergocalciferol (Vitamin D-2) 1.25 MG (90958 UT) capsule TAKE 1 CAPSULE BY MOUTH ONCE A WEEK 6 capsule 1    fluticasone (Flonase) 50 mcg/actuation nasal spray Administer 1 spray into each nostril once daily as needed.      hydrOXYzine HCL (Atarax) 10 mg tablet Take 2.5 tablets (25 mg) by mouth every 8 hours if needed for itching. 90 tablet 0    minoxidil (Loniten) 2.5 mg tablet Take 0.5 tablets (1.25 mg) by mouth once daily. 90 tablet 0    mirabegron (Myrbetriq) 50 mg tablet extended release 24 hr 24 hr tablet Take 1 tablet (50 mg) by mouth once daily. 90 tablet 3    montelukast (Singulair) 10 mg tablet TAKE 1 TABLET BY MOUTH EVERY DAY AS NEEDED FOR SINUS CONGESTION AS DIRECTED 90 tablet 0    omeprazole (PriLOSEC) 40 mg DR capsule TAKE 1 CAPSULE (40 MG) BY MOUTH ONCE DAILY IN THE MORNING. TAKE BEFORE MEALS. 90 capsule 1    oxyBUTYnin XL (Ditropan-XL) 15 mg 24 hr tablet Take 1 tablet (15 mg) by mouth 2 times a day. 180 tablet 1    simvastatin (Zocor) 20 mg tablet Take 1 tablet (20 mg) by mouth once daily at bedtime. 90 tablet 3    sodium hyaluronate (Gelsyn-3) 16.8 mg/2 mL injection Inject 2 mL (16.8 mg) into the joint 1 (one) time per week. 2 mL 0    sodium  hyaluronate (Gelsyn-3) 16.8 mg/2 mL injection Inject 2 mL (16.8 mg) into the joint 1 (one) time per week. 2 mL 0    triamcinolone (Kenalog) 0.025 % ointment APPLY TO AFFECTED AREA TWICE A DAY 30 g 1    biotin 5,000 mcg tablet,disintegrating Take 1 tablet by mouth once daily. (Patient not taking: Reported on 7/9/2025)      bisacodyl (Dulcolax) 10 mg suppository Insert 1 suppository (10 mg) into the rectum once daily as needed. (Patient not taking: Reported on 7/9/2025)      chlorhexidine (Peridex) 0.12 % solution SWISH AND SPIT 15ML FOR 30 SECONDS NIGHT PRIOR TO SURGERY AND MORNING OF SURGERY (Patient not taking: Reported on 7/9/2025) 473 mL 0    cyanocobalamin (Vitamin B-12) 1,000 mcg tablet Take 1 tablet (1,000 mcg) by mouth once daily. (Patient not taking: Reported on 7/9/2025)      dutasteride (Avodart) 0.5 mg capsule Take by mouth.      hydroCHLOROthiazide (HYDRODiuril) 25 mg tablet Take 1 tablet (25 mg) by mouth once daily. (Patient not taking: Reported on 7/9/2025) 90 tablet 3    ketorolac (Toradol) 10 mg tablet Take 1 tablet (10 mg) by mouth every 6 hours if needed for moderate pain (4 - 6). (Patient not taking: Reported on 7/9/2025) 12 tablet 0    LORazepam (Ativan) 0.5 mg tablet Take 1 tablet (0.5 mg) by mouth see administration instructions for 7 days. 1 by mouth 1 hour prior to MRI, may repeat X 1 dose (Patient not taking: Reported on 7/9/2025) 2 tablet 0    pantoprazole (ProtoNix) 40 mg EC tablet Take 1 tablet (40 mg) by mouth once daily in the morning. Take before meals. Do not crush, chew, or split. (Patient not taking: Reported on 7/9/2025) 30 tablet 0    tobramycin (Tobrex) 0.3 % ophthalmic solution Administer into affected eye(s). (Patient not taking: Reported on 7/9/2025)      traMADol (Ultram) 50 mg tablet Take 1 tablet (50 mg) by mouth every 8 hours if needed for severe pain (7 - 10). (Patient not taking: Reported on 7/9/2025) 28 tablet 0    vitamin E mixed 100 unit tablet Take by mouth once  daily. (Patient not taking: Reported on 7/9/2025)       No current facility-administered medications for this visit.

## 2025-07-11 LAB
ATRIAL RATE: 81 BPM
P AXIS: 29 DEGREES
P OFFSET: 181 MS
P ONSET: 123 MS
PR INTERVAL: 182 MS
Q ONSET: 214 MS
QRS COUNT: 13 BEATS
QRS DURATION: 84 MS
QT INTERVAL: 368 MS
QTC CALCULATION(BAZETT): 427 MS
QTC FREDERICIA: 406 MS
R AXIS: -22 DEGREES
T AXIS: 28 DEGREES
T OFFSET: 398 MS
VENTRICULAR RATE: 81 BPM

## 2025-07-15 ENCOUNTER — TELEPHONE (OUTPATIENT)
Dept: PRIMARY CARE | Facility: CLINIC | Age: 65
End: 2025-07-15
Payer: COMMERCIAL

## 2025-07-15 DIAGNOSIS — I10 PRIMARY HYPERTENSION: Primary | ICD-10-CM

## 2025-07-16 LAB
ALBUMIN SERPL-MCNC: 4.1 G/DL (ref 3.6–5.1)
ALP SERPL-CCNC: 76 U/L (ref 37–153)
ALT SERPL-CCNC: 11 U/L (ref 6–29)
ANION GAP SERPL CALCULATED.4IONS-SCNC: 9 MMOL/L (CALC) (ref 7–17)
AST SERPL-CCNC: 17 U/L (ref 10–35)
BILIRUB SERPL-MCNC: 0.7 MG/DL (ref 0.2–1.2)
BUN SERPL-MCNC: 7 MG/DL (ref 7–25)
CALCIUM SERPL-MCNC: 9.5 MG/DL (ref 8.6–10.4)
CHLORIDE SERPL-SCNC: 104 MMOL/L (ref 98–110)
CHOLEST SERPL-MCNC: 193 MG/DL
CHOLEST/HDLC SERPL: 2.9 (CALC)
CO2 SERPL-SCNC: 26 MMOL/L (ref 20–32)
CREAT SERPL-MCNC: 0.64 MG/DL (ref 0.5–1.05)
EGFRCR SERPLBLD CKD-EPI 2021: 97 ML/MIN/1.73M2
ERYTHROCYTE [DISTWIDTH] IN BLOOD BY AUTOMATED COUNT: 14.7 % (ref 11–15)
EST. AVERAGE GLUCOSE BLD GHB EST-MCNC: 105 MG/DL
EST. AVERAGE GLUCOSE BLD GHB EST-SCNC: 5.8 MMOL/L
GLUCOSE SERPL-MCNC: 100 MG/DL (ref 65–99)
HBA1C MFR BLD: 5.3 %
HCT VFR BLD AUTO: 39.1 % (ref 35–45)
HDLC SERPL-MCNC: 67 MG/DL
HGB BLD-MCNC: 13.2 G/DL (ref 11.7–15.5)
LDLC SERPL CALC-MCNC: 100 MG/DL (CALC)
MCH RBC QN AUTO: 29.3 PG (ref 27–33)
MCHC RBC AUTO-ENTMCNC: 33.8 G/DL (ref 32–36)
MCV RBC AUTO: 86.9 FL (ref 80–100)
NONHDLC SERPL-MCNC: 126 MG/DL (CALC)
PLATELET # BLD AUTO: 267 THOUSAND/UL (ref 140–400)
PMV BLD REES-ECKER: 9.6 FL (ref 7.5–12.5)
POTASSIUM SERPL-SCNC: 4.1 MMOL/L (ref 3.5–5.3)
PROT SERPL-MCNC: 6.7 G/DL (ref 6.1–8.1)
RBC # BLD AUTO: 4.5 MILLION/UL (ref 3.8–5.1)
SODIUM SERPL-SCNC: 139 MMOL/L (ref 135–146)
TRIGL SERPL-MCNC: 162 MG/DL
TSH SERPL-ACNC: 0.54 MIU/L (ref 0.4–4.5)
WBC # BLD AUTO: 5.8 THOUSAND/UL (ref 3.8–10.8)

## 2025-07-22 ENCOUNTER — APPOINTMENT (OUTPATIENT)
Dept: UROLOGY | Facility: CLINIC | Age: 65
End: 2025-07-22
Payer: COMMERCIAL

## 2025-08-01 ENCOUNTER — OFFICE VISIT (OUTPATIENT)
Dept: CARDIOLOGY | Facility: CLINIC | Age: 65
End: 2025-08-01
Payer: COMMERCIAL

## 2025-08-01 ENCOUNTER — HOSPITAL ENCOUNTER (OUTPATIENT)
Dept: CARDIOLOGY | Facility: CLINIC | Age: 65
Discharge: HOME | End: 2025-08-01
Payer: COMMERCIAL

## 2025-08-01 VITALS
WEIGHT: 187 LBS | DIASTOLIC BLOOD PRESSURE: 82 MMHG | BODY MASS INDEX: 31.16 KG/M2 | HEART RATE: 83 BPM | SYSTOLIC BLOOD PRESSURE: 127 MMHG | OXYGEN SATURATION: 96 % | HEIGHT: 65 IN

## 2025-08-01 DIAGNOSIS — R00.2 PALPITATIONS: ICD-10-CM

## 2025-08-01 DIAGNOSIS — R07.9 CHEST PAIN, UNSPECIFIED TYPE: Primary | ICD-10-CM

## 2025-08-01 DIAGNOSIS — E78.5 DYSLIPIDEMIA: ICD-10-CM

## 2025-08-01 LAB
AORTIC VALVE PEAK VELOCITY: 1.57 M/S
AV PEAK GRADIENT: 10 MMHG
AVA (PEAK VEL): 1.72 CM2
EJECTION FRACTION APICAL 4 CHAMBER: 63.8
EJECTION FRACTION: 63 %
LEFT ATRIUM VOLUME AREA LENGTH INDEX BSA: 20.2 ML/M2
LEFT VENTRICLE INTERNAL DIMENSION DIASTOLE: 4.29 CM (ref 3.5–6)
LEFT VENTRICULAR OUTFLOW TRACT DIAMETER: 1.89 CM
MITRAL VALVE E/A RATIO: 0.89
RIGHT VENTRICLE FREE WALL PEAK S': 13 CM/S
RIGHT VENTRICLE PEAK SYSTOLIC PRESSURE: 34 MMHG
TRICUSPID ANNULAR PLANE SYSTOLIC EXCURSION: 1.8 CM

## 2025-08-01 PROCEDURE — 3074F SYST BP LT 130 MM HG: CPT | Performed by: NURSE PRACTITIONER

## 2025-08-01 PROCEDURE — 93306 TTE W/DOPPLER COMPLETE: CPT | Performed by: INTERNAL MEDICINE

## 2025-08-01 PROCEDURE — 93306 TTE W/DOPPLER COMPLETE: CPT

## 2025-08-01 PROCEDURE — 99214 OFFICE O/P EST MOD 30 MIN: CPT | Performed by: NURSE PRACTITIONER

## 2025-08-01 PROCEDURE — 1159F MED LIST DOCD IN RCRD: CPT | Performed by: NURSE PRACTITIONER

## 2025-08-01 PROCEDURE — 3079F DIAST BP 80-89 MM HG: CPT | Performed by: NURSE PRACTITIONER

## 2025-08-01 PROCEDURE — 3008F BODY MASS INDEX DOCD: CPT | Performed by: NURSE PRACTITIONER

## 2025-08-01 PROCEDURE — 99212 OFFICE O/P EST SF 10 MIN: CPT | Mod: 25

## 2025-08-01 PROCEDURE — 1126F AMNT PAIN NOTED NONE PRSNT: CPT | Performed by: NURSE PRACTITIONER

## 2025-08-01 PROCEDURE — 1160F RVW MEDS BY RX/DR IN RCRD: CPT | Performed by: NURSE PRACTITIONER

## 2025-08-01 ASSESSMENT — ENCOUNTER SYMPTOMS
CONSTITUTIONAL NEGATIVE: 1
LOSS OF SENSATION IN FEET: 0
DEPRESSION: 0
RESPIRATORY NEGATIVE: 1
MUSCULOSKELETAL NEGATIVE: 1
GASTROINTESTINAL NEGATIVE: 1
NEUROLOGICAL NEGATIVE: 1
OCCASIONAL FEELINGS OF UNSTEADINESS: 0

## 2025-08-01 ASSESSMENT — PAIN SCALES - GENERAL: PAINLEVEL_OUTOF10: 0-NO PAIN

## 2025-08-01 ASSESSMENT — PATIENT HEALTH QUESTIONNAIRE - PHQ9
2. FEELING DOWN, DEPRESSED OR HOPELESS: NOT AT ALL
1. LITTLE INTEREST OR PLEASURE IN DOING THINGS: NOT AT ALL
SUM OF ALL RESPONSES TO PHQ9 QUESTIONS 1 AND 2: 0

## 2025-08-01 ASSESSMENT — LIFESTYLE VARIABLES
SKIP TO QUESTIONS 9-10: 1
HOW MANY STANDARD DRINKS CONTAINING ALCOHOL DO YOU HAVE ON A TYPICAL DAY: 1 OR 2
HOW OFTEN DO YOU HAVE SIX OR MORE DRINKS ON ONE OCCASION: NEVER
HOW OFTEN DO YOU HAVE A DRINK CONTAINING ALCOHOL: 2-4 TIMES A MONTH
AUDIT-C TOTAL SCORE: 2

## 2025-08-01 ASSESSMENT — COLUMBIA-SUICIDE SEVERITY RATING SCALE - C-SSRS
1. IN THE PAST MONTH, HAVE YOU WISHED YOU WERE DEAD OR WISHED YOU COULD GO TO SLEEP AND NOT WAKE UP?: NO
2. HAVE YOU ACTUALLY HAD ANY THOUGHTS OF KILLING YOURSELF?: NO
6. HAVE YOU EVER DONE ANYTHING, STARTED TO DO ANYTHING, OR PREPARED TO DO ANYTHING TO END YOUR LIFE?: NO

## 2025-08-01 NOTE — PROGRESS NOTES
"Chief Complaint:   Chest Pain (Last felt was last week)    History Of Present Illness:    .Ms Leyva is here in follow up.  She has a greater than 10 year history of \"chest heaviness\" relieved with drinking water.  Unable to qualify if could be palpitations. Denies sob or pedal edema.  Denies lightheadedness, dizziness, presyncope or syncope.  Echo done today was reviewed by Dr Clemons.            Chest Pain          Last Recorded Vitals:  Blood pressure 127/82, pulse 83, height 1.651 m (5' 5\"), weight 84.8 kg (187 lb), SpO2 96%.     Past Medical History:  Medical History[1]     Past Surgical History:  Surgical History[2]    Social History:  Social History[3]    Family History:  Family History[4]      Allergies:  Pollen extracts    Outpatient Medications:  Current Medications[5]     Physical Exam:  Cardiovascular:      PMI at left midclavicular line. Normal rate. Regular rhythm. Normal S1. Normal S2.       Murmurs: There is no murmur.      No gallop.  No click. No rub.   Pulses:     Intact distal pulses.   Edema:     Peripheral edema absent.       ROS:  Review of Systems   Constitutional: Negative.   Cardiovascular:  Positive for chest pain.   Respiratory: Negative.     Skin: Negative.    Musculoskeletal: Negative.    Gastrointestinal: Negative.    Genitourinary: Negative.    Neurological: Negative.           Last Labs: A1c tsh lipid cbc cmp reviewed 07/2025  CBC -  Lab Results   Component Value Date    WBC 5.8 07/15/2025    HGB 13.2 07/15/2025    HCT 39.1 07/15/2025    MCV 86.9 07/15/2025     07/15/2025       CMP -  Lab Results   Component Value Date    CALCIUM 9.5 07/15/2025    PROT 6.7 07/15/2025    ALBUMIN 4.1 07/15/2025    AST 17 07/15/2025    ALT 11 07/15/2025    ALKPHOS 76 07/15/2025    BILITOT 0.7 07/15/2025       LIPID PANEL -   Lab Results   Component Value Date    CHOL 193 07/15/2025    TRIG 162 (H) 07/15/2025    HDL 67 07/15/2025    CHHDL 2.9 07/15/2025    LDLF 135 (H) 04/07/2023    VLDL 28 " 2024    NHDL 126 07/15/2025       RENAL FUNCTION PANEL -   Lab Results   Component Value Date    GLUCOSE 100 (H) 07/15/2025     07/15/2025    K 4.1 07/15/2025     07/15/2025    CO2 26 07/15/2025    ANIONGAP 9 07/15/2025    BUN 7 07/15/2025    CREATININE 0.64 07/15/2025    CALCIUM 9.5 07/15/2025    ALBUMIN 4.1 07/15/2025        Lab Results   Component Value Date    HGBA1C 5.3 07/15/2025         Assessment/Plan   Problem List Items Addressed This Visit    None     ? CAD.  Will have her obtain coronary artery calcium score. Planned for next month.   Family hx CAD with mother having had CABG. Reviewed.  HPL. Currently on simvastatin 20 mg.  Will wait to change med until coronary artery calcium score.   Former smoking. Reviewed.   5.  Chest pain vs palpitations.  ECG done prior shows NSR.  She would like to defer monitor, but agreeable to echo which was reviewed by Dr Clemons.      Chanda Cruz, APRN-CNP       [1]   Past Medical History:  Diagnosis Date    Arthritis     Asthma     Carcinoid tumor     noted on colonoscopy, s/p resection    Eczema     GERD (gastroesophageal reflux disease)     Hyperlipidemia     Hypertension     OAB (overactive bladder)     Vitamin D deficiency    [2]   Past Surgical History:  Procedure Laterality Date    COLONOSCOPY      HIP ARTHROPLASTY Left 2023    OTHER SURGICAL HISTORY      carcinoid tumor resection    TOTAL HIP ARTHROPLASTY Right     TUBAL LIGATION  2016    Tubal Ligation   [3]   Social History  Socioeconomic History    Marital status: Single   Tobacco Use    Smoking status: Former     Current packs/day: 0.00     Average packs/day: 1 pack/day for 25.0 years (25.0 ttl pk-yrs)     Types: Cigarettes     Start date:      Quit date:      Years since quittin.5    Smokeless tobacco: Never   Substance and Sexual Activity    Alcohol use: Yes     Alcohol/week: 1.0 standard drink of alcohol     Types: 1 Standard drinks or equivalent per week    Drug  use: Never     Social Drivers of Health     Financial Resource Strain: Low Risk  (11/29/2023)    Overall Financial Resource Strain (CARDIA)     Difficulty of Paying Living Expenses: Not hard at all   Food Insecurity: No Food Insecurity (11/25/2023)    Hunger Vital Sign     Worried About Running Out of Food in the Last Year: Never true     Ran Out of Food in the Last Year: Never true   Transportation Needs: No Transportation Needs (12/22/2023)    OASIS : Transportation     Lack of Transportation (Medical): No     Lack of Transportation (Non-Medical): No     Patient Unable or Declines to Respond: No   Physical Activity: Inactive (11/25/2023)    Exercise Vital Sign     Days of Exercise per Week: 0 days     Minutes of Exercise per Session: 0 min   Social Connections: Feeling Socially Integrated (12/22/2023)    OASIS : Social Isolation     Frequency of experiencing loneliness or isolation: Never   Intimate Partner Violence: Not At Risk (11/25/2023)    Humiliation, Afraid, Rape, and Kick questionnaire     Fear of Current or Ex-Partner: No     Emotionally Abused: No     Physically Abused: No     Sexually Abused: No   Housing Stability: Low Risk  (11/29/2023)    Housing Stability Vital Sign     Unable to Pay for Housing in the Last Year: No     Number of Places Lived in the Last Year: 1     Unstable Housing in the Last Year: No   [4]   Family History  Problem Relation Name Age of Onset    Coronary artery disease Mother          CABG    Other (homicicde) Father      Hypertension Father      Breast cancer Mother's Sister      Colon cancer Mother's Brother      Ovarian cysts Maternal Cousin     [5]   Current Outpatient Medications   Medication Sig Dispense Refill    albuterol 90 mcg/actuation inhaler Inhale 1-2 puffs every 6 hours if needed for wheezing. EVERY 4 TO 6 HOURS AS NEEDED 18 g 11    amitriptyline (Elavil) 10 mg tablet Take 1 tablet (10 mg) by mouth once daily at bedtime. 90 tablet 3    azelastine (Optivar)  0.05 % ophthalmic solution Administer 1 drop into affected eye(s) 2 times a day as needed (itchy eyes). affected eye 6 mL 3    cholecalciferol (Vitamin D-3) 50 mcg (2,000 units) capsule Take 1 capsule (50 mcg) by mouth early in the morning.. 90 capsule 3    clobetasol (Temovate) 0.05 % cream APPLY sparingly to affected area Twice daily for 2 weeks, then once daily as needed.      cyclobenzaprine (Flexeril) 10 mg tablet Take 1 tablet (10 mg) by mouth 3 times a day as needed for muscle spasms. 30 tablet 1    diclofenac sodium (Voltaren XR) 100 mg 24 hr tablet TAKE 1 TABLET BY MOUTH EVERY DAY 90 tablet 0    diclofenac sodium (Voltaren) 1 % gel APPLY 4.5 INCHES (4 G) TOPICALLY 2 TIMES A DAY AS NEEDED (JOINT PAIN). APPLY SPARINGLY TO AFFECTED AREA EVERY  g 1    dutasteride (Avodart) 0.5 mg capsule Take by mouth.      ergocalciferol (Vitamin D-2) 1.25 MG (92599 UT) capsule TAKE 1 CAPSULE BY MOUTH ONCE A WEEK 6 capsule 1    fluticasone (Flonase) 50 mcg/actuation nasal spray Administer 1 spray into each nostril once daily as needed.      hydrOXYzine HCL (Atarax) 10 mg tablet Take 2.5 tablets (25 mg) by mouth every 8 hours if needed for itching. 90 tablet 0    minoxidil (Loniten) 2.5 mg tablet Take 0.5 tablets (1.25 mg) by mouth once daily. 90 tablet 0    mirabegron (Myrbetriq) 50 mg tablet extended release 24 hr 24 hr tablet Take 1 tablet (50 mg) by mouth once daily. 90 tablet 3    montelukast (Singulair) 10 mg tablet TAKE 1 TABLET BY MOUTH EVERY DAY AS NEEDED FOR SINUS CONGESTION AS DIRECTED 90 tablet 0    omeprazole (PriLOSEC) 40 mg DR capsule TAKE 1 CAPSULE (40 MG) BY MOUTH ONCE DAILY IN THE MORNING. TAKE BEFORE MEALS. 90 capsule 1    oxyBUTYnin XL (Ditropan-XL) 15 mg 24 hr tablet Take 1 tablet (15 mg) by mouth 2 times a day. 180 tablet 1    simvastatin (Zocor) 20 mg tablet Take 1 tablet (20 mg) by mouth once daily at bedtime. 90 tablet 3    sodium hyaluronate (Gelsyn-3) 16.8 mg/2 mL injection Inject 2 mL (16.8  mg) into the joint 1 (one) time per week. 2 mL 0    triamcinolone (Kenalog) 0.025 % ointment APPLY TO AFFECTED AREA TWICE A DAY 30 g 1     No current facility-administered medications for this visit.

## 2025-08-06 ENCOUNTER — APPOINTMENT (OUTPATIENT)
Dept: PRIMARY CARE | Facility: CLINIC | Age: 65
End: 2025-08-06
Payer: COMMERCIAL

## 2025-08-07 ENCOUNTER — APPOINTMENT (OUTPATIENT)
Dept: PRIMARY CARE | Facility: CLINIC | Age: 65
End: 2025-08-07
Payer: COMMERCIAL

## 2025-08-07 VITALS
OXYGEN SATURATION: 95 % | SYSTOLIC BLOOD PRESSURE: 138 MMHG | HEIGHT: 64 IN | WEIGHT: 190.8 LBS | BODY MASS INDEX: 32.58 KG/M2 | DIASTOLIC BLOOD PRESSURE: 84 MMHG | TEMPERATURE: 97.3 F | HEART RATE: 87 BPM

## 2025-08-07 DIAGNOSIS — R07.89 OTHER CHEST PAIN: Primary | ICD-10-CM

## 2025-08-07 DIAGNOSIS — E55.9 VITAMIN D DEFICIENCY: ICD-10-CM

## 2025-08-07 DIAGNOSIS — T14.8XXA MUSCLE STRAIN: ICD-10-CM

## 2025-08-07 DIAGNOSIS — Z76.89 ENCOUNTER TO ESTABLISH CARE WITH NEW DOCTOR: ICD-10-CM

## 2025-08-07 DIAGNOSIS — Z78.0 POSTMENOPAUSAL: ICD-10-CM

## 2025-08-07 DIAGNOSIS — R13.10 DYSPHAGIA, UNSPECIFIED TYPE: ICD-10-CM

## 2025-08-07 PROCEDURE — 1159F MED LIST DOCD IN RCRD: CPT | Performed by: STUDENT IN AN ORGANIZED HEALTH CARE EDUCATION/TRAINING PROGRAM

## 2025-08-07 PROCEDURE — 3079F DIAST BP 80-89 MM HG: CPT | Performed by: STUDENT IN AN ORGANIZED HEALTH CARE EDUCATION/TRAINING PROGRAM

## 2025-08-07 PROCEDURE — 1160F RVW MEDS BY RX/DR IN RCRD: CPT | Performed by: STUDENT IN AN ORGANIZED HEALTH CARE EDUCATION/TRAINING PROGRAM

## 2025-08-07 PROCEDURE — 99214 OFFICE O/P EST MOD 30 MIN: CPT | Performed by: STUDENT IN AN ORGANIZED HEALTH CARE EDUCATION/TRAINING PROGRAM

## 2025-08-07 PROCEDURE — 3008F BODY MASS INDEX DOCD: CPT | Performed by: STUDENT IN AN ORGANIZED HEALTH CARE EDUCATION/TRAINING PROGRAM

## 2025-08-07 PROCEDURE — 3075F SYST BP GE 130 - 139MM HG: CPT | Performed by: STUDENT IN AN ORGANIZED HEALTH CARE EDUCATION/TRAINING PROGRAM

## 2025-08-07 RX ORDER — ACETAMINOPHEN 500 MG
50 TABLET ORAL DAILY
Qty: 90 CAPSULE | Refills: 3 | Status: SHIPPED | OUTPATIENT
Start: 2025-08-07

## 2025-08-07 RX ORDER — CYCLOBENZAPRINE HCL 10 MG
10 TABLET ORAL 3 TIMES DAILY PRN
Qty: 30 TABLET | Refills: 3 | Status: SHIPPED | OUTPATIENT
Start: 2025-08-07

## 2025-08-07 ASSESSMENT — ENCOUNTER SYMPTOMS
OCCASIONAL FEELINGS OF UNSTEADINESS: 0
LOSS OF SENSATION IN FEET: 0
DEPRESSION: 0

## 2025-08-07 NOTE — PROGRESS NOTES
"  [facility]  [address], [address]  [place] [address]    Subjective     Patient ID: Kiara Leyva is a 65 y.o. female who presents for  Establish Care (Chest  issues)     She has been having ongoing chest pain like symptoms for years. She thinks since 2010. She has seen cardiology. She gets palpitations. Has not passed out. Alleviated with water and rest. Does not correlate with activity. No fevers, chills, fatigue, or wt loss.     She once in the past had to get eso dilation and EGD.         Review of Systems    Social History[1]  Family History[2]  RX Allergies[3]   Current Medications[4]    /84 (BP Location: Right arm, Patient Position: Sitting, BP Cuff Size: Adult)   Pulse 87   Temp 36.3 °C (97.3 °F) (Temporal)   Ht 1.626 m (5' 4\")   Wt 86.5 kg (190 lb 12.8 oz)   SpO2 95%   BMI 32.75 kg/m²      Objective   Physical Exam  Vitals reviewed.   Constitutional:       Appearance: Normal appearance.     Eyes:      Extraocular Movements: Extraocular movements intact.      Pupils: Pupils are equal, round, and reactive to light.       Cardiovascular:      Rate and Rhythm: Normal rate and regular rhythm.      Pulses: Normal pulses.      Heart sounds: Normal heart sounds. No murmur heard.     No friction rub. No gallop.   Pulmonary:      Effort: Pulmonary effort is normal. No respiratory distress.      Breath sounds: Normal breath sounds. No stridor. No wheezing, rhonchi or rales.   Chest:      Chest wall: No tenderness.   Abdominal:      General: Abdomen is flat. There is no distension.      Palpations: Abdomen is soft. There is no mass.      Tenderness: There is no abdominal tenderness. There is no guarding or rebound.      Hernia: No hernia is present.     Musculoskeletal:         General: Normal range of motion.      Cervical back: Normal range of motion and neck supple.     Neurological:      General: No focal deficit present.      Mental Status: She is alert. " "    Psychiatric:         Mood and Affect: Mood normal.         Behavior: Behavior normal.           Labs:   Lab Results   Component Value Date    WBC 5.8 07/15/2025    HGB 13.2 07/15/2025    HCT 39.1 07/15/2025     07/15/2025    TSH 0.54 07/15/2025     Lab Results   Component Value Date     07/15/2025    K 4.1 07/15/2025     07/15/2025    BUN 7 07/15/2025    CREATININE 0.64 07/15/2025    GLUCOSE 100 (H) 07/15/2025    CALCIUM 9.5 07/15/2025    PROT 6.7 07/15/2025    BILITOT 0.7 07/15/2025    ALKPHOS 76 07/15/2025    AST 17 07/15/2025    ALT 11 07/15/2025     Lab Results   Component Value Date    CHOL 193 07/15/2025    CHOL 210 (H) 07/11/2024    CHOL 225 (H) 04/07/2023      Lab Results   Component Value Date    TRIG 162 (H) 07/15/2025    TRIG 139 07/11/2024    TRIG 127 04/07/2023      Lab Results   Component Value Date    HDL 67 07/15/2025    HDL 67.5 07/11/2024    HDL 65.1 04/07/2023     Lab Results   Component Value Date    LDLCALC 100 (H) 07/15/2025    LDLCALC 115 (H) 07/11/2024      Lab Results   Component Value Date    VLDL 28 07/11/2024    VLDL 25 04/07/2023    No components found for: \"[id number]\"    No data recorded    Imaging/Testing: Transthoracic echo (TTE) [facility], [address] [place] [address]             Tel [phone] and Fax [phone]    TRANSTHORACIC ECHOCARDIOGRAM REPORT       Patient Name:       SOHAM Lincoln Physician:    [id number] Adalberto Clemons MD  Study Date:         8/1/2025            Ordering Provider:    [id number] SHARDA LUU  MRN/PID:            [id number]            Fellow:  Accession#:         [id number]        Nurse:                Cortes Coy RN-BC  Date of Birth/Age:  1960 / 65 years Sonographer:          Maryana Kong                                                                " RCS  Gender assigned at  F                   Additional Staff:  Birth:  Height:             165.10 cm           Admit Date:  Weight:             85.73 kg            Admission Status:     Outpatient  BSA / BMI:          1.93 m2 / 31.45     Encounter#:           [id number]                      kg/m2  Blood Pressure:     127/82 mmHg         Department Location:  [facility]    Study Type:    TRANSTHORACIC ECHO (TTE) COMPLETE  Diagnosis/ICD: Palpitations-R00.2  Indication:    Palpitations  CPT Code:      Echo Complete w Full Doppler-[id number]    Patient History:  Pertinent         Palpitations, Dyslipidemia, CP, SOB, ex smoker (quit 13 yrs.  History:          ago).    Study Detail: The following Echo studies were performed: 2D, M-Mode, Doppler and                color flow. Agitated saline used as a contrast agent for                intraseptal flow evaluation.       PHYSICIAN INTERPRETATION:  Left Ventricle: The left ventricular systolic function is normal with a visually estimated ejection fraction of 60-65%. There are no regional left ventricular wall motion abnormalities. The left ventricular cavity size is normal. There is normal septal and normal posterior left ventricular wall thickness. There is left ventricular concentric remodeling. Spectral Doppler shows an abnormal pattern of left ventricular diastolic filling.  Left Atrium: The left atrial size is normal. There is no evidence of a patent foramen ovale. A bubble study using agitated saline was performed. Bubble study is negative.  Right Ventricle: The right ventricle is normal in size. There is normal right ventriclar wall thickness. There is normal right ventricular global systolic function.  Right Atrium: The right atrium is normal in size.  Aortic Valve: The aortic valve is trileaflet. There is no aortic valve cusp calcification noted. There is no evidence of reduced aortic valve leaflet excursion. There is trace aortic valve regurgitation. There is trace  aortic valve insufficiency.  Mitral Valve: The mitral valve is normal in structure. There is normal mitral valve leaflet mobility. There is no evidence of mitral valve regurgitation. The E Vmax is 0.67 m/s.  Tricuspid Valve: The tricuspid valve is structurally normal. There is normal tricuspid valve leaflet mobility. There is trace to mild tricuspid regurgitation.  Pulmonic Valve: The pulmonic valve is structurally normal. There is trace to mild pulmonic valve regurgitation.  Pericardium: There is no pericardial effusion noted. There is a pericardial fat pad present.  Aorta: The aortic root is normal. The Ao Sinus is 3.10 cm. The Asc Ao is 3.10 cm. The thoracic aorta diam is 2.3 cm. There is no dilatation of the aortic arch. There is no dilatation of the ascending aorta. There is no dilatation of the aortic root.  Pulmonary Artery: The pulmonary artery is normal in size. The tricuspid regurgitant velocity is 2.78 m/s, and with an estimated right atrial pressure of 3, the estimated pulmonary artery pressure is normal with the RVSP at 34 mmHg. The estimated PASP is 34 mmHg.  Systemic Veins: The inferior vena cava appears normal in size, with IVC inspiratory collapse greater than 50%.  In comparison to the previous echocardiogram(s): There are no prior studies on this patient for comparison purposes.       CONCLUSIONS:   1. The left ventricular systolic function is normal with a visually estimated ejection fraction of 60-65%.   2. Spectral Doppler shows an abnormal pattern of left ventricular diastolic filling.   3. Trace to mild tricuspid regurgitation visualized.   4. There is trace aortic valve insufficiency.   5. There is no evidence of a patent foramen ovale.    QUANTITATIVE DATA SUMMARY:     2D MEASUREMENTS:          Normal Ranges:  Ao Root d:       2.40 cm  (2.0-3.7cm)  LAs:             3.75 cm  (2.7-4.0cm)  IVSd:            0.93 cm  (0.6-1.1cm)  LVPWd:           0.93 cm  (0.6-1.1cm)  LVIDd:           4.29 cm   (3.9-5.9cm)  LVIDs:           3.18 cm  LV Mass Index:   66 g/m2  LVEDV Index:     28 ml/m2  LV % FS          25.9 %       LEFT ATRIUM:                  Normal Ranges:  LA Vol A4C:        36.8 ml    (22+/-6mL/m2)  LA Vol A2C:        39.0 ml  LA Vol BP:         39.0 ml  LA Vol Index A4C:  19.0 ml/m2  LA Vol Index A2C:  20.2 ml/m2  LA Vol Index BP:   20.2 ml/m2  LA Area A4C:       15.0 cm2  LA Area A2C:       15.0 cm2  LA Major Axis A4C: 5.2 cm  LA Major Axis A2C: 4.9 cm  LA Volume Index:   21.0 ml/m2  LA Vol A4C:        34.6 ml  LA Vol A2C:        38.9 ml  LA Vol Index BSA:  19.0 ml/m2       RIGHT ATRIUM:                 Normal Ranges:  RA Vol A4C:        31.2 ml    (8.3-19.5ml)  RA Vol Index A4C:  16.2 ml/m2  RA Area A4C:       13.0 cm2  RA Major Axis A4C: 4.6 cm       M-MODE MEASUREMENTS:         Normal Ranges:  Ao Root:             3.20 cm (2.0-3.7cm)  LAs:                 4.35 cm (2.7-4.0cm)       AORTA MEASUREMENTS:         Normal Ranges:  Ao Sinus, d:        3.10 cm (2.1-3.5cm)  Asc Ao, d:          3.10 cm (2.1-3.4cm)  Ao Arch:            2.30 cm (2.0-3.6cm)       LV SYSTOLIC FUNCTION:                       Normal Ranges:  EF-A4C View:    64 % (>=55%)  EF-A2C View:    70 %  EF-Biplane:     67 %  EF-Visual:      63 %  LV EF Reported: 63 %       LV DIASTOLIC FUNCTION:             Normal Ranges:  MV Peak E:             0.67 m/s    (0.7-1.2 m/s)  MV Peak A:             0.76 m/s    (0.42-0.7 m/s)  E/A Ratio:             0.89        (1.0-2.2)  MV e'                  0.070 m/s   (>8.0)  MV lateral e'          0.07 m/s  MV medial e'           0.07 m/s  MV A Dur:              114.18 msec  E/e' Ratio:            9.61        (<8.0)  a'                     0.10 m/s  PulmV Sys Osmany:         56.25 cm/s  PulmV Isabel Osmany:        31.63 cm/s  PulmV S/D Osmany:         1.78  PulmV A Revs Osmany:      28.15 cm/s  PulmV A Revs Dur:      98.95 msec       MITRAL VALVE:          Normal Ranges:  MV DT:        216 msec (150-240msec)       AORTIC  VALVE:           Normal Ranges:  AoV Vmax:      1.57 m/s (<=1.7m/s)  AoV Peak P.9 mmHg (<20mmHg)  LVOT Max Osmany:  0.96 m/s (<=1.1m/s)  LVOT VTI:      16.60 cm  LVOT Diameter: 1.89 cm  (1.8-2.4cm)  AoV Area,Vmax: 1.72 cm2 (2.5-4.5cm2)       RIGHT VENTRICLE:  RV Basal 3.50 cm  RV Mid   2.80 cm  RV Major 6.2 cm  TAPSE:   18.4 mm  RV s'    0.13 m/s       TRICUSPID VALVE/RVSP:          Normal Ranges:  Peak TR Velocity:     2.78 m/s  Est. RA Pressure:     3  RV Syst Pressure:     34       (< 30mmHg)  IVC Diam:             1.30 cm       PULMONIC VALVE:          Normal Ranges:  PV Accel Time:  83 msec  (>120ms)  PV Max Osmany:     1.0 m/s  (0.6-0.9m/s)  PV Max PG:      3.8 mmHg       PULMONARY VEINS:  PulmV A Revs Dur: 98.95 msec  PulmV A Revs Osmany: 28.15 cm/s  PulmV Isabel Osmany:   31.63 cm/s  PulmV S/D Osmany:    1.78  PulmV Sys Osmany:    56.25 cm/s       AORTA:  Asc Ao Diam 3.07 cm       [id number] Adalberto Clemons MD  Electronically signed on 2025 at 5:43:18 PM       ** Final **    Assessment/Plan   Problem List Items Addressed This Visit       Vitamin D deficiency    Relevant Medications    cholecalciferol (Vitamin D-3) 50 mcg (2,000 units) capsule    Muscle strain    Relevant Medications    cyclobenzaprine (Flexeril) 10 mg tablet    Dysphagia    Relevant Orders    Esophagogastroduodenoscopy (EGD)     Other Visit Diagnoses         Other chest pain    -  Primary    Relevant Orders    Esophagogastroduodenoscopy (EGD)      Screening for lung cancer          Postmenopausal        Relevant Orders    XR DEXA bone density      Encounter to establish care with new doctor          Cigarette nicotine dependence with nicotine-induced disorder              Chest Pain  dysphagia  - chronic issue, initial encounter  - dw pt possibility that minoxidil can worsen or contribute, she will talk with her derm  - will check EGD  - having CT coronary soon  - dw pt ct low dose lung screen, can order after her CAC    Vit D  - dw pt to switch to   units daily, no utility in high dose if not consistently <10 vit D levels    Screening tests as above      orders and follow up as documented in EMR, lab results reviewed with patient, repeat labs ordered prior to next appointment, reviewed medications and side effects in detail     Return visit in a few months.  Chest pain, egd results.         PING MATTSON MD, [facility]  Department of Family Medicine of [facility]         [1]   Social History  Tobacco Use    Smoking status: Former     Current packs/day: 0.00     Average packs/day: 1 pack/day for 25.0 years (25.0 ttl pk-yrs)     Types: Cigarettes     Start date:      Quit date:      Years since quittin.6    Smokeless tobacco: Never   Substance Use Topics    Alcohol use: Yes     Alcohol/week: 1.0 standard drink of alcohol     Types: 1 Standard drinks or equivalent per week     Comment: social    Drug use: Never   [2]   Family History  Problem Relation Name Age of Onset    Coronary artery disease Mother          CABG    Other (homicicde) Father      Hypertension Father      Breast cancer Mother's Sister      Colon cancer Mother's Brother      Ovarian cysts Maternal Cousin     [3]   Allergies  Allergen Reactions    Pollen Extracts Other     Sneezing and watery eyes   [4]   Current Outpatient Medications   Medication Sig Dispense Refill    albuterol 90 mcg/actuation inhaler Inhale 1-2 puffs every 6 hours if needed for wheezing. EVERY 4 TO 6 HOURS AS NEEDED 18 g 11    amitriptyline (Elavil) 10 mg tablet Take 1 tablet (10 mg) by mouth once daily at bedtime. 90 tablet 3    azelastine (Optivar) 0.05 % ophthalmic solution Administer 1 drop into affected eye(s) 2 times a day as needed (itchy eyes). affected eye 6 mL 3    clobetasol (Temovate) 0.05 % cream APPLY sparingly to affected area Twice daily for 2 weeks, then once daily as needed.      diclofenac sodium (Voltaren XR) 100 mg 24 hr tablet TAKE 1 TABLET BY MOUTH  EVERY DAY 90 tablet 0    diclofenac sodium (Voltaren) 1 % gel APPLY 4.5 INCHES (4 G) TOPICALLY 2 TIMES A DAY AS NEEDED (JOINT PAIN). APPLY SPARINGLY TO AFFECTED AREA EVERY  g 1    fluticasone (Flonase) 50 mcg/actuation nasal spray Administer 1 spray into each nostril once daily as needed.      hydrOXYzine HCL (Atarax) 10 mg tablet Take 2.5 tablets (25 mg) by mouth every 8 hours if needed for itching. 90 tablet 0    minoxidil (Loniten) 2.5 mg tablet Take 0.5 tablets (1.25 mg) by mouth once daily. 90 tablet 0    mirabegron (Myrbetriq) 50 mg tablet extended release 24 hr 24 hr tablet Take 1 tablet (50 mg) by mouth once daily. 90 tablet 3    montelukast (Singulair) 10 mg tablet TAKE 1 TABLET BY MOUTH EVERY DAY AS NEEDED FOR SINUS CONGESTION AS DIRECTED 90 tablet 0    omeprazole (PriLOSEC) 40 mg DR capsule TAKE 1 CAPSULE (40 MG) BY MOUTH ONCE DAILY IN THE MORNING. TAKE BEFORE MEALS. 90 capsule 1    oxyBUTYnin XL (Ditropan-XL) 15 mg 24 hr tablet Take 1 tablet (15 mg) by mouth 2 times a day. 180 tablet 1    simvastatin (Zocor) 20 mg tablet Take 1 tablet (20 mg) by mouth once daily at bedtime. 90 tablet 3    cholecalciferol (Vitamin D-3) 50 mcg (2,000 units) capsule Take 1 capsule (50 mcg) by mouth early in the morning.. 90 capsule 3    cyclobenzaprine (Flexeril) 10 mg tablet Take 1 tablet (10 mg) by mouth 3 times a day as needed for muscle spasms. 30 tablet 3    dutasteride (Avodart) 0.5 mg capsule Take by mouth.      triamcinolone (Kenalog) 0.025 % ointment APPLY TO AFFECTED AREA TWICE A DAY 30 g 1     No current facility-administered medications for this visit.

## 2025-08-28 ENCOUNTER — APPOINTMENT (OUTPATIENT)
Dept: UROLOGY | Facility: CLINIC | Age: 65
End: 2025-08-28
Payer: COMMERCIAL

## 2025-09-29 ENCOUNTER — APPOINTMENT (OUTPATIENT)
Dept: OBSTETRICS AND GYNECOLOGY | Facility: CLINIC | Age: 65
End: 2025-09-29
Payer: COMMERCIAL

## 2025-11-12 ENCOUNTER — APPOINTMENT (OUTPATIENT)
Dept: PRIMARY CARE | Facility: CLINIC | Age: 65
End: 2025-11-12
Payer: COMMERCIAL

## (undated) DEVICE — Device

## (undated) DEVICE — HOOD, STERISHIELD T4 SYSTEM

## (undated) DEVICE — GLOVE, SURGICAL, PROTEXIS PI MICRO, 8.0, PF, LF

## (undated) DEVICE — DRAPE, INCISE, ANTIMICROBIAL, IOBAN 2, STERI DRAPE, 23 X 33 IN, DISPOSABLE, STERILE

## (undated) DEVICE — SUTURE, VICRYL, 0, 36 IN, CT-1, UNDYED

## (undated) DEVICE — SUTURE, VICRYL, 2-0, 27 IN, FSL, UNDYED

## (undated) DEVICE — CAUTERY, PENCIL, PUSH BUTTON, SMOKE EVAC, 70MM

## (undated) DEVICE — SUTURE, V-LOC, 3-0, 18IN, P-12

## (undated) DEVICE — CLOSURE SYSTEM, DERMABOND, PRINEO, 22CM, STERILE

## (undated) DEVICE — GLOVE, SURGICAL, PROTEXIS PI ORTHO, 8.0, PF, LF

## (undated) DEVICE — SOLUTION, IRRIGATION, STERILE WATER, 1000 ML, POUR BOTTLE

## (undated) DEVICE — SUTURE, VICRYL, 1, 27 IN, CT-1, VIOLET

## (undated) DEVICE — TUBING, IRRIGATION, HIGH FLOW, HAND PIECE SET